# Patient Record
Sex: FEMALE | Race: WHITE | Employment: OTHER | URBAN - METROPOLITAN AREA
[De-identification: names, ages, dates, MRNs, and addresses within clinical notes are randomized per-mention and may not be internally consistent; named-entity substitution may affect disease eponyms.]

---

## 2019-12-17 ENCOUNTER — HOSPITAL ENCOUNTER (INPATIENT)
Facility: HOSPITAL | Age: 79
LOS: 2 days | Discharge: HOME | DRG: 193 | End: 2019-12-19
Attending: EMERGENCY MEDICINE | Admitting: INTERNAL MEDICINE
Payer: MEDICARE

## 2019-12-17 ENCOUNTER — APPOINTMENT (OUTPATIENT)
Dept: CT IMAGING | Facility: HOSPITAL | Age: 79
DRG: 193 | End: 2019-12-17
Attending: EMERGENCY MEDICINE
Payer: MEDICARE

## 2019-12-17 ENCOUNTER — APPOINTMENT (OUTPATIENT)
Dept: GENERAL RADIOLOGY | Facility: HOSPITAL | Age: 79
DRG: 193 | End: 2019-12-17
Attending: EMERGENCY MEDICINE
Payer: MEDICARE

## 2019-12-17 DIAGNOSIS — E87.6 HYPOKALEMIA: Primary | ICD-10-CM

## 2019-12-17 DIAGNOSIS — R09.02 HYPOXIA: ICD-10-CM

## 2019-12-17 DIAGNOSIS — J18.9 COMMUNITY ACQUIRED PNEUMONIA OF LEFT LOWER LOBE OF LUNG: ICD-10-CM

## 2019-12-17 PROCEDURE — 71275 CT ANGIOGRAPHY CHEST: CPT | Performed by: EMERGENCY MEDICINE

## 2019-12-17 PROCEDURE — 71046 X-RAY EXAM CHEST 2 VIEWS: CPT | Performed by: EMERGENCY MEDICINE

## 2019-12-17 PROCEDURE — 99223 1ST HOSP IP/OBS HIGH 75: CPT | Performed by: HOSPITALIST

## 2019-12-17 RX ORDER — ENOXAPARIN SODIUM 100 MG/ML
40 INJECTION SUBCUTANEOUS DAILY
Status: DISCONTINUED | OUTPATIENT
Start: 2019-12-18 | End: 2019-12-19

## 2019-12-17 RX ORDER — GUAIFENESIN/DEXTROMETHORPHAN 100-10MG/5
5 SYRUP ORAL EVERY 6 HOURS PRN
Status: DISCONTINUED | OUTPATIENT
Start: 2019-12-17 | End: 2019-12-19

## 2019-12-17 RX ORDER — LEVOFLOXACIN 5 MG/ML
750 INJECTION, SOLUTION INTRAVENOUS ONCE
Status: COMPLETED | OUTPATIENT
Start: 2019-12-17 | End: 2019-12-17

## 2019-12-17 RX ORDER — IPRATROPIUM BROMIDE AND ALBUTEROL SULFATE 2.5; .5 MG/3ML; MG/3ML
3 SOLUTION RESPIRATORY (INHALATION) ONCE
Status: COMPLETED | OUTPATIENT
Start: 2019-12-17 | End: 2019-12-17

## 2019-12-17 RX ORDER — POTASSIUM CHLORIDE 20 MEQ/1
40 TABLET, EXTENDED RELEASE ORAL ONCE
Status: COMPLETED | OUTPATIENT
Start: 2019-12-17 | End: 2019-12-17

## 2019-12-17 RX ORDER — METOCLOPRAMIDE HYDROCHLORIDE 5 MG/ML
10 INJECTION INTRAMUSCULAR; INTRAVENOUS EVERY 8 HOURS PRN
Status: DISCONTINUED | OUTPATIENT
Start: 2019-12-17 | End: 2019-12-19

## 2019-12-17 RX ORDER — ALBUTEROL SULFATE 2.5 MG/3ML
2.5 SOLUTION RESPIRATORY (INHALATION) EVERY 2 HOUR PRN
Status: DISCONTINUED | OUTPATIENT
Start: 2019-12-17 | End: 2019-12-19

## 2019-12-17 RX ORDER — ACETAMINOPHEN 325 MG/1
650 TABLET ORAL EVERY 6 HOURS PRN
Status: DISCONTINUED | OUTPATIENT
Start: 2019-12-17 | End: 2019-12-19

## 2019-12-17 RX ORDER — BENZONATATE 100 MG/1
100 CAPSULE ORAL 3 TIMES DAILY PRN
Status: DISCONTINUED | OUTPATIENT
Start: 2019-12-17 | End: 2019-12-19

## 2019-12-17 RX ORDER — TRAZODONE HYDROCHLORIDE 50 MG/1
50 TABLET ORAL NIGHTLY PRN
Status: DISCONTINUED | OUTPATIENT
Start: 2019-12-17 | End: 2019-12-19

## 2019-12-18 ENCOUNTER — APPOINTMENT (OUTPATIENT)
Dept: CV DIAGNOSTICS | Facility: HOSPITAL | Age: 79
DRG: 193 | End: 2019-12-18
Attending: HOSPITALIST
Payer: MEDICARE

## 2019-12-18 PROBLEM — I10 ESSENTIAL HYPERTENSION: Chronic | Status: ACTIVE | Noted: 2019-12-18

## 2019-12-18 PROBLEM — J96.01 ACUTE RESPIRATORY FAILURE WITH HYPOXIA (HCC): Status: ACTIVE | Noted: 2019-12-18

## 2019-12-18 PROCEDURE — 99232 SBSQ HOSP IP/OBS MODERATE 35: CPT | Performed by: HOSPITALIST

## 2019-12-18 PROCEDURE — 93306 TTE W/DOPPLER COMPLETE: CPT | Performed by: HOSPITALIST

## 2019-12-18 RX ORDER — HYDRALAZINE HYDROCHLORIDE 20 MG/ML
10 INJECTION INTRAMUSCULAR; INTRAVENOUS EVERY 6 HOURS PRN
Status: DISCONTINUED | OUTPATIENT
Start: 2019-12-18 | End: 2019-12-19

## 2019-12-18 RX ORDER — POTASSIUM CHLORIDE 20 MEQ/1
40 TABLET, EXTENDED RELEASE ORAL EVERY 4 HOURS
Status: COMPLETED | OUTPATIENT
Start: 2019-12-18 | End: 2019-12-18

## 2019-12-18 NOTE — PROGRESS NOTES
[facility]  Progress Note     Yi Patience Patient Status:  Observation    1940 MRN [id number]   [facility] 5NW-A Attending Boo Alarcon [address] Day # 0 PCP None Pcp     Chief Complaint: Cough    S: Patient seen / PLAN:     1. Acute hypoxic respiratory failure due to mycoplasma pneumonia (empiric rocephin and azithromycin for now); prn cough medicine; prn nebs  2.  Hypertension-not on meds for this at home-patient states she has had elevated blood pressure for a lo

## 2019-12-18 NOTE — PROGRESS NOTES
NURSING ADMISSION NOTE      Patient admitted via Cart to room 506  Oriented to room. Safety precautions initiated. Bed in low position. Call light in reach. Admission navigator complete. Pt and daughter at bedside updated on poc.  Report given to

## 2019-12-18 NOTE — PLAN OF CARE
Pt is aox4, VSS. BP has been controlled. Glasses. 3L O2. RA base. Congested sounding cough not coughing anything up. Nebs. Lovenox. Electrolyte protocol, replaced K+ today. Up SB to void. IV abx. Regular diet tolerating well.  Pt has no c/o pain SOB or n/v/

## 2019-12-18 NOTE — ED NOTES
Per ED Provider - Dr. Lennie Thornton, no blood cultures needed to be drawn at this time. OK to start Levaquin IV.

## 2019-12-18 NOTE — CM/SW NOTE
12/18/19 1216   CM/SW Referral Data   Referral Source    Reason for Referral   (PCP)   Informant Patient   Patient Info   Patient's Mental Status Alert;Oriented   Patient's [address]   Patient lives with Spouse   Patient Status

## 2019-12-18 NOTE — PROGRESS NOTES
12/18/19 2565   Clinical Encounter Type   Visited With Patient and family together  (Responded to Advanced Directive consult by giving patient the [facility] short form for their perusal and possible completion as is appropriate or nece

## 2019-12-18 NOTE — DIETARY NOTE
[address] ASSESSMENT    Pt does not meet malnutrition criteria at this time.     NUTRITION DIAGNOSIS/PROBLEM:    Possible inadequate energy intake related to physiologic causes as evidenced by MST score of 2 for wt loss/decreased Maintain lean body mass    MEDICATIONS:  KCl (40 meq) tablets, IV abx    LABS:  Glu:122, K+:3.4, GFR:78    Pt is at moderate nutrition risk    FOLLOW-UP DATE:   12/19/2019    Rosalie Rivas MS, RD, LDN  Clinical Dietitian  Pager# [phone]

## 2019-12-18 NOTE — ED NOTES
Report given to Humberto Mcnair, [address] [facility] z [id number] at 2207. Transport paged. Pt and family at bedside updated.

## 2019-12-18 NOTE — H&P
[facility]  History and Physical     Lucia Flores Patient Status:  Emergency    1940 MRN [id number]   Location [address] Attending Gurpreet Ruiz MD   Hosp Day # 0 PCP None Pcp     Chief Complaint: cough    His Psychiatric: Appropriate mood and affect.       Diagnostic Data:      Labs:  Recent Labs   Lab 12/17/19 1905   WBC 11.7*   HGB 13.4   .6*   .0       Recent Labs   Lab 12/17/19 1905   *   BUN 15   CREATSERUM 0.90   GFRAA 71   GFRNAA

## 2019-12-18 NOTE — PAYOR COMM NOTE
--------------  ADMISSION REVIEW     Payor: [address] [place], [place] [address] #:  [id number]  Authorization Number:  [id number]    Admit date: 12/17/19  Admit time: 2233       Admitting Physician: Fan Underwood MD  Attending Physician:  Virginia Cortes Musculoskeletal: Moves all extremities. Extremities: No edema or cyanosis. Integument: No rashes or lesions. Psychiatric: Appropriate mood and affect.       Diagnostic Data:      Labs:  Recent Labs   Lab 12/17/19  1905   WBC 11.7*   HGB 13.4   .6 Patient stating she is feeling a little bit better this morning than she was yesterday. Still having cough and some generalized fatigue and weakness.   No overnight events or new complaints.      Vital signs:  Temp:  [97.9 °F (36.6 °C)-98.5 °F (36.9 °C)] 9 levoFLOXacin in D5W (LEVAQUIN) IVPB premix 750 mg     Date Action Dose Route User    12/17/2019 2053 Restarted (none) Intravenous Rupal Cabrera RN    12/17/2019 2012 New Bag 750 mg Intravenous Tessie Lombardi RN      Potassium Chloride

## 2019-12-18 NOTE — ED NOTES
2nd attempt to call for report at 2202, receiving SCOTT Davenport is currently unavailable and will callback in 5 mins, as stated.

## 2019-12-19 VITALS
TEMPERATURE: 98 F | HEIGHT: 61 IN | BODY MASS INDEX: 28 KG/M2 | RESPIRATION RATE: 18 BRPM | SYSTOLIC BLOOD PRESSURE: 157 MMHG | OXYGEN SATURATION: 97 % | WEIGHT: 148.31 LBS | HEART RATE: 78 BPM | DIASTOLIC BLOOD PRESSURE: 78 MMHG

## 2019-12-19 PROCEDURE — 99239 HOSP IP/OBS DSCHRG MGMT >30: CPT | Performed by: HOSPITALIST

## 2019-12-19 RX ORDER — LISINOPRIL 10 MG/1
10 TABLET ORAL DAILY
Qty: 30 TABLET | Refills: 3 | Status: SHIPPED | OUTPATIENT
Start: 2019-12-20 | End: 2019-12-27

## 2019-12-19 RX ORDER — LISINOPRIL 10 MG/1
10 TABLET ORAL DAILY
Status: DISCONTINUED | OUTPATIENT
Start: 2019-12-19 | End: 2019-12-19

## 2019-12-19 RX ORDER — HYDROCHLOROTHIAZIDE 25 MG/1
25 TABLET ORAL DAILY
Status: DISCONTINUED | OUTPATIENT
Start: 2019-12-19 | End: 2019-12-19

## 2019-12-19 RX ORDER — HYDROCHLOROTHIAZIDE 25 MG/1
25 TABLET ORAL DAILY
Qty: 30 TABLET | Refills: 3 | Status: SHIPPED | OUTPATIENT
Start: 2019-12-20 | End: 2019-12-23

## 2019-12-19 RX ORDER — AZITHROMYCIN 250 MG/1
250 TABLET, FILM COATED ORAL DAILY
Qty: 3 TABLET | Refills: 0 | Status: SHIPPED | OUTPATIENT
Start: 2019-12-19 | End: 2019-12-22

## 2019-12-19 RX ORDER — ALBUTEROL SULFATE 90 UG/1
1 AEROSOL, METERED RESPIRATORY (INHALATION) EVERY 6 HOURS PRN
Qty: 1 INHALER | Refills: 0 | Status: SHIPPED | OUTPATIENT
Start: 2019-12-19 | End: 2020-01-10 | Stop reason: ALTCHOICE

## 2019-12-19 NOTE — PROGRESS NOTES
NURSING DISCHARGE NOTE    Discharged Home via Wheelchair. Accompanied by Family member and Spouse  Belongings Taken by patient/family   Pt is aox4, VSS, afebrile. No c/o pain SOB or n/v/d. RA. Went on O2 walk, tolerated very well.  Discharged via wheel

## 2019-12-19 NOTE — PLAN OF CARE
Problem: Patient/Family Goals  Goal: Patient/Family Long Term Goal  Description  Patient's Long Term Goal: discharge home    Interventions:  - wean off oxygen  - antibiotics  - See additional Care Plan goals for specific interventions   Outcome: Progress B/P high, hydralazine given. Pt voiding well. No BM this shift. No c/o pain. Pt up standby assist.  Will continue to monitor.

## 2019-12-19 NOTE — PROGRESS NOTES
12/19/19 1100   Mobility   O2 walk? Yes   SPO2 on Room Air at Rest 91   SPO2 Ambulation on Room Air 91   Ambulation oxygen flow (liters per minute) 0       Pt went on O2 walk. Started on RA and did not require O2 during the walk.  Remained at 91%, no c/o

## 2019-12-20 ENCOUNTER — PATIENT OUTREACH (OUTPATIENT)
Dept: CASE MANAGEMENT | Age: 79
End: 2019-12-20

## 2019-12-20 DIAGNOSIS — E87.6 HYPOKALEMIA: ICD-10-CM

## 2019-12-20 DIAGNOSIS — J96.01 ACUTE RESPIRATORY FAILURE WITH HYPOXIA (HCC): ICD-10-CM

## 2019-12-20 DIAGNOSIS — I10 ESSENTIAL HYPERTENSION: Chronic | ICD-10-CM

## 2019-12-20 DIAGNOSIS — Z02.9 ENCOUNTERS FOR UNSPECIFIED ADMINISTRATIVE PURPOSE: ICD-10-CM

## 2019-12-20 DIAGNOSIS — J18.9 COMMUNITY ACQUIRED PNEUMONIA OF LEFT LOWER LOBE OF LUNG: ICD-10-CM

## 2019-12-20 PROBLEM — I51.7 CARDIOMEGALY: Status: ACTIVE | Noted: 2019-12-20

## 2019-12-20 PROCEDURE — 1111F DSCHRG MED/CURRENT MED MERGE: CPT

## 2019-12-20 NOTE — PROGRESS NOTES
Initial Post Discharge Follow Up   Discharge Date: 12/19/19  Contact Date: 12/20/2019    Consent Verification:  Assessment Completed With: Patient  HIPAA Verified?   Yes    Discharge Dx:    Hypokalemia, community acquired pneumonia of left lower lobe of diagnoses? No  • Are you able to perform normal daily activities of living as you have prior to your hospital stay (dressing, bathing, ambulating to the bathroom, etc)?  yes  • (NCM) Was patient given a different diet per AVS? no      Medications:   Current Date & Time Appointment Department [facility])    Dec 23, 2019  9:00 AM [facility] Follow Up with James, [address] ([address])            [facility]  [facility]

## 2019-12-20 NOTE — ED PROVIDER NOTES
Patient Seen in: [facility] 5nw-a      History   Patient presents with:  Dyspnea AUBREY SOB    Stated Complaint: cough and sob x 2 wks. pneumonia and moderate cardiomegaly.  115-120 hr. 92% RA.*    HPI    80-year-old female presents emergency department erythema or exudate in the posterior pharynx  Neck: Supple no JVD no lymphadenopathy no meningismus no carotid bruit  CV: Tachycardic, no murmur no rub  Respiratory: Clear to auscultation bilaterally no crackles no wheezes no accessory muscle use  Abdomen: Please view results for these tests on the individual orders. RAINBOW DRAW BLUE   RAINBOW DRAW LAVENDER   RAINBOW DRAW LIGHT GREEN   RAINBOW DRAW GOLD          Patient had potassium noted to be low and was given supplementation.     Xr Chest acquired pneumonia of left lower lobe of lung  Hypoxia    Disposition:  Admit  12/17/2019  8:29 pm    Follow-up:  No follow-up provider specified.       Medications Prescribed:  Discharge Medication List as of 12/19/2019 12:24 PM    START taking these medic

## 2019-12-20 NOTE — PAYOR COMM NOTE
--------------  DISCHARGE REVIEW    Payor: [facility] [place] #:  [id number]  Authorization Number:  [id number]    Admit date: 12/17/19  Admit time:  2233  Discharge Date: 12/19/2019  2:01 PM     Admitting Physician: MD DAPHNE Stephens

## 2019-12-23 ENCOUNTER — OFFICE VISIT (OUTPATIENT)
Dept: INTERNAL MEDICINE CLINIC | Facility: CLINIC | Age: 79
End: 2019-12-23
Payer: COMMERCIAL

## 2019-12-23 VITALS
OXYGEN SATURATION: 98 % | BODY MASS INDEX: 27.56 KG/M2 | WEIGHT: 146 LBS | RESPIRATION RATE: 22 BRPM | HEIGHT: 61 IN | TEMPERATURE: 98 F

## 2019-12-23 DIAGNOSIS — E87.6 HYPOKALEMIA: ICD-10-CM

## 2019-12-23 DIAGNOSIS — J96.01 ACUTE RESPIRATORY FAILURE WITH HYPOXIA (HCC): Primary | ICD-10-CM

## 2019-12-23 DIAGNOSIS — I10 ESSENTIAL HYPERTENSION: Chronic | ICD-10-CM

## 2019-12-23 DIAGNOSIS — I51.7 CARDIOMEGALY: ICD-10-CM

## 2019-12-23 DIAGNOSIS — J18.9 COMMUNITY ACQUIRED PNEUMONIA OF LEFT LOWER LOBE OF LUNG: ICD-10-CM

## 2019-12-23 PROCEDURE — 1111F DSCHRG MED/CURRENT MED MERGE: CPT | Performed by: CLINICAL NURSE SPECIALIST

## 2019-12-23 PROCEDURE — 99495 TRANSJ CARE MGMT MOD F2F 14D: CPT | Performed by: CLINICAL NURSE SPECIALIST

## 2019-12-23 PROCEDURE — 80048 BASIC METABOLIC PNL TOTAL CA: CPT | Performed by: CLINICAL NURSE SPECIALIST

## 2019-12-23 NOTE — PROGRESS NOTES
[facility]ITION[facility]ARE CLINIC PHARMACIST MEDICATION RECONCILIATION        Boo Santillan MRN [id number]    1940 PCP None Pcp       Comments: Medication history completed in [address] [place] [address] by pharmacist with patient and spouse.  Patient eloise indication, timing of administration, monitoring parameters, and potential side effects of medications. Patient confirmed understanding.      Thank you,    Jd Farrar, PharmD, 12/23/2019, 8:47 AM  [facility]

## 2019-12-23 NOTE — PROGRESS NOTES
Garo Lawson 6      HISTORY   CHIEF COMPLAINT: post hospital follow up visit  HPI: Smith Dominguez is a 67year old female here today for follow up after being hospitalized for cough, weakness, body aches.   Juaquin Fay Diagnosis Date   • Essential hypertension    • High blood pressure       No past surgical history on file. No family history on file.    Social History    Tobacco Use      Smoking status: Never Smoker      Smokeless tobacco: Never Used    Alcohol use: N chest soreness with deep inspiration  CARDIOVASCULAR: denies syncope, reports lightheaded feelings at times with movement   GI: denies abdominal pain MUSCULOSKELETAL: states normal range of motion of extremities  NEUROLOGIC: denies confusion    PHYSICAL EX Disp: 1 Inhaler, Rfl: 0      Requested Prescriptions      No prescriptions requested or ordered in this encounter         Health Maintenance:  Annual Physical due on 07/18/1944  DEXA Scan due on 07/18/2006  Pneumococcal PPSV23/PCV13 65+ Years / Low and Med Visit: patient request Dr. Digna Bueno; appointment 1/13/20  3.  Cards referral: cards office to call patient to schedule     TRI De Paz, 12/23/2019  [facility]  [phone]

## 2019-12-23 NOTE — PATIENT INSTRUCTIONS
Patient Instructions:  1. We will call you with your lab results. 2.  Continue to measure your blood pressure and temperature. Record the findings and bring the log of results to your next appointment. 3.  We are closed on 12/24/19 and 12/25/19.   If yo

## 2019-12-26 ENCOUNTER — APPOINTMENT (OUTPATIENT)
Dept: LAB | Age: 79
End: 2019-12-26
Attending: CLINICAL NURSE SPECIALIST
Payer: MEDICARE

## 2019-12-26 DIAGNOSIS — E87.6 HYPOKALEMIA: ICD-10-CM

## 2019-12-26 PROCEDURE — 80048 BASIC METABOLIC PNL TOTAL CA: CPT

## 2019-12-26 PROCEDURE — 36415 COLL VENOUS BLD VENIPUNCTURE: CPT

## 2019-12-27 ENCOUNTER — TELEPHONE (OUTPATIENT)
Dept: INTERNAL MEDICINE CLINIC | Facility: CLINIC | Age: 79
End: 2019-12-27

## 2019-12-27 ENCOUNTER — OFFICE VISIT (OUTPATIENT)
Dept: INTERNAL MEDICINE CLINIC | Facility: CLINIC | Age: 79
End: 2019-12-27
Payer: COMMERCIAL

## 2019-12-27 VITALS
BODY MASS INDEX: 27.75 KG/M2 | TEMPERATURE: 97 F | HEIGHT: 61 IN | OXYGEN SATURATION: 100 % | HEART RATE: 60 BPM | DIASTOLIC BLOOD PRESSURE: 68 MMHG | WEIGHT: 147 LBS | RESPIRATION RATE: 18 BRPM | SYSTOLIC BLOOD PRESSURE: 112 MMHG

## 2019-12-27 DIAGNOSIS — I10 ESSENTIAL HYPERTENSION: Chronic | ICD-10-CM

## 2019-12-27 DIAGNOSIS — J96.01 ACUTE RESPIRATORY FAILURE WITH HYPOXIA (HCC): Primary | ICD-10-CM

## 2019-12-27 DIAGNOSIS — J18.9 COMMUNITY ACQUIRED PNEUMONIA OF LEFT LOWER LOBE OF LUNG: ICD-10-CM

## 2019-12-27 DIAGNOSIS — E87.6 HYPOKALEMIA: ICD-10-CM

## 2019-12-27 DIAGNOSIS — I51.7 CARDIOMEGALY: ICD-10-CM

## 2019-12-27 PROBLEM — N18.30 CKD (CHRONIC KIDNEY DISEASE) STAGE 3, GFR 30-59 ML/MIN (HCC): Chronic | Status: ACTIVE | Noted: 2019-12-27

## 2019-12-27 PROCEDURE — 99213 OFFICE O/P EST LOW 20 MIN: CPT | Performed by: CLINICAL NURSE SPECIALIST

## 2019-12-27 NOTE — PATIENT INSTRUCTIONS
Patient Instructions:  1. STOP your lisinopril. 2.  Continue to take your blood pressure every morning.   Call the results of your blood pressure to [facility] at the [address] [place] [address] at [phone].  3.  You will need to follow up with the cardiologis

## 2019-12-27 NOTE — PROGRESS NOTES
[address] [facility] FOLLOW-UP VISIT     Marilu Ashby MRN [id number]    1940 PCP None Pcp     CHIEF COMPLAINT: follow up visit  HPI: Ms. Oksana Yan is here today for a follow up visit.   She was last seen on 19 for a post hospital fo lightheadedness   RESPIRATORY: reports intermittent productive cough of clear colored sputum, denies dyspnea with exertion today   CARDIOVASCULAR: denies chest pain, palpitations   GI: denies abdominal pain  MUSCULOSKELETAL: states normal range of motion o Ambar 1/13/20  3.   Cards Appointment pending      Jeff Noguera, TRI, 12/27/2019   [facility]

## 2019-12-30 NOTE — DISCHARGE SUMMARY
[facility] HOSPITALIST  DISCHARGE SUMMARY     Magaly Metzger Patient Status:  Inpatient    1940 MRN [id number]   [facility] 5NW-A Attending No att. providers found   [address] Day # 2 PCP None Pcp     Date of Admission: 2019  Apolinar of Minda Borrero lungs every 6 (six) hours as needed for Wheezing or Shortness of Breath.    Quantity:  1 Inhaler  Refills:  0        ASK your doctor about these medications      Instructions Prescription details   azithromycin 250 MG Tabs  Commonly known as:  Oleg Downey  As

## 2020-01-03 ENCOUNTER — OFFICE VISIT (OUTPATIENT)
Dept: INTERNAL MEDICINE CLINIC | Facility: CLINIC | Age: 80
End: 2020-01-03
Payer: COMMERCIAL

## 2020-01-03 VITALS
HEIGHT: 61 IN | SYSTOLIC BLOOD PRESSURE: 138 MMHG | HEART RATE: 117 BPM | TEMPERATURE: 98 F | BODY MASS INDEX: 27.38 KG/M2 | DIASTOLIC BLOOD PRESSURE: 88 MMHG | OXYGEN SATURATION: 98 % | WEIGHT: 145 LBS | RESPIRATION RATE: 18 BRPM

## 2020-01-03 DIAGNOSIS — I10 ESSENTIAL HYPERTENSION: Primary | Chronic | ICD-10-CM

## 2020-01-03 DIAGNOSIS — I51.7 CARDIOMEGALY: ICD-10-CM

## 2020-01-03 PROCEDURE — 99213 OFFICE O/P EST LOW 20 MIN: CPT | Performed by: CLINICAL NURSE SPECIALIST

## 2020-01-03 NOTE — PATIENT INSTRUCTIONS
Patient Instructions:  1. Continue to measure your blood pressure every day and call the office with your results. [phone]  2. You should eat breakfast, lunch and dinner every day and drink 64 oz of water every day.

## 2020-01-03 NOTE — PROGRESS NOTES
[facility] [place] FOLLOW-UP VISIT     Estiven Becker MRN [id number]    1940 PCP None Pcp     CHIEF COMPLAINT: follow up visit for fatigue/lightheadedness  HPI: The patient is here today for a follow up visit.   She was last seen on  MUSCULOSKELETAL: states normal range of motion of extremities  NEUROLOGIC: denies confusion  PHYSICAL EXAM:  /88   Pulse 117   Temp 98.2 °F (36.8 °C) (Temporal)   Resp 18   Ht 61\"   Wt 145 lb (65.8 kg)   SpO2 98%   BMI 27.40 kg/m²    GENERAL: well

## 2020-01-07 NOTE — TELEPHONE ENCOUNTER
Pt's  states pt's BP this morning was 128/89. States pt \"feels fine\" and denies pt experiencing dizziness for the last 4 days.

## 2020-01-10 ENCOUNTER — OFFICE VISIT (OUTPATIENT)
Dept: INTERNAL MEDICINE CLINIC | Facility: CLINIC | Age: 80
End: 2020-01-10
Payer: COMMERCIAL

## 2020-01-10 VITALS
WEIGHT: 146 LBS | SYSTOLIC BLOOD PRESSURE: 132 MMHG | HEART RATE: 110 BPM | RESPIRATION RATE: 18 BRPM | HEIGHT: 61 IN | TEMPERATURE: 98 F | OXYGEN SATURATION: 99 % | BODY MASS INDEX: 27.56 KG/M2 | DIASTOLIC BLOOD PRESSURE: 88 MMHG

## 2020-01-10 DIAGNOSIS — I51.7 CARDIOMEGALY: ICD-10-CM

## 2020-01-10 DIAGNOSIS — I10 ESSENTIAL HYPERTENSION: Primary | Chronic | ICD-10-CM

## 2020-01-10 PROCEDURE — 99212 OFFICE O/P EST SF 10 MIN: CPT | Performed by: CLINICAL NURSE SPECIALIST

## 2020-01-10 NOTE — PROGRESS NOTES
[facility]ARE CLINIC FOLLOW-UP VISIT     Smith Dominguez MRN [id number]    1940 PCP None Pcp     CHIEF COMPLAINT: follow up visit for hypertension, c/o lightheadedness  HPI: Mrs. Tara Tang is here today for a follow up visit.   She was last GENERAL: well developed, well nourished, in no apparent distress  INTEGUMENTARY: warm, dry  HEENT: atraumatic, normocephalic  LUNGS: clear to auscultation bilaterally, no wheezes, rhonchi or rales, normal respiratory effort  NEURO: oriented x3  PSYCHIATR

## 2021-07-23 ENCOUNTER — APPOINTMENT (OUTPATIENT)
Dept: GENERAL RADIOLOGY | Facility: HOSPITAL | Age: 81
End: 2021-07-23
Payer: MEDICARE

## 2021-07-23 ENCOUNTER — HOSPITAL ENCOUNTER (EMERGENCY)
Facility: HOSPITAL | Age: 81
Discharge: HOME | End: 2021-07-23
Attending: EMERGENCY MEDICINE
Payer: MEDICARE

## 2021-07-23 ENCOUNTER — APPOINTMENT (OUTPATIENT)
Dept: GENERAL RADIOLOGY | Facility: HOSPITAL | Age: 81
End: 2021-07-23
Attending: EMERGENCY MEDICINE
Payer: MEDICARE

## 2021-07-23 ENCOUNTER — TELEPHONE (OUTPATIENT)
Dept: ORTHOPEDICS CLINIC | Facility: CLINIC | Age: 81
End: 2021-07-23

## 2021-07-23 VITALS
TEMPERATURE: 99 F | HEART RATE: 92 BPM | WEIGHT: 147.94 LBS | DIASTOLIC BLOOD PRESSURE: 93 MMHG | RESPIRATION RATE: 16 BRPM | SYSTOLIC BLOOD PRESSURE: 175 MMHG | OXYGEN SATURATION: 95 % | BODY MASS INDEX: 28 KG/M2

## 2021-07-23 DIAGNOSIS — S82.891A CLOSED FRACTURE OF RIGHT ANKLE, INITIAL ENCOUNTER: Primary | ICD-10-CM

## 2021-07-23 PROCEDURE — 73610 X-RAY EXAM OF ANKLE: CPT | Performed by: EMERGENCY MEDICINE

## 2021-07-23 PROCEDURE — 73630 X-RAY EXAM OF FOOT: CPT | Performed by: EMERGENCY MEDICINE

## 2021-07-23 PROCEDURE — 99284 EMERGENCY DEPT VISIT MOD MDM: CPT

## 2021-07-23 PROCEDURE — 99283 EMERGENCY DEPT VISIT LOW MDM: CPT

## 2021-07-23 NOTE — ED QUICK NOTES
Attempted to ambulate with walker, unsuccessful. Pt can take very slow short steps with assist of 2 people.   Will involve  for rehab

## 2021-07-23 NOTE — CM/SW NOTE
Asked to see patient that is needing SAMMIE. Once CM got to patient's room, the patient and family are now requesting to be discharged home. Family has arranged for 24 hour care and is refusing SAMMIE placement. MD and RN updated.

## 2021-07-23 NOTE — ED PROVIDER NOTES
Patient Seen in: [facility] Emergency Department      History   Patient presents with:  Leg or Foot Injury    Stated Complaint: ankle inj    HPI/Subjective:   HPI    52-year-old female presents emergency room chief complaint of right ankle/foot pain bilaterally. No Rales, no rhonchi, no wheezing, no stridor. ABDOMEN: Soft, nondistended,nontender  EXTREMITIES: No tenderness to the bilateral upper extremities.   Pelvis stable no tenderness bilateral hips, no tenderness deformity to the left lower extre Prescribed:  There are no discharge medications for this patient.

## 2021-07-23 NOTE — TELEPHONE ENCOUNTER
· Pt was seen today in ER  · RIGHT ankle fracture  · Please advise where we can put pt on the schedule

## 2021-07-23 NOTE — ED QUICK NOTES
Family states they've made arrangements to be with patient 24/7,  Decline to talk with , decline rehab. They state they feel she will be safe at home with family.  Walker home with patient

## 2021-07-23 NOTE — TELEPHONE ENCOUNTER
Spoke with patient's daughter, and notified her that she can be seen next week with Dr. Elsy Ambriz at [address] [place] [address]. Pt's daughter asked to be seen at [facility] d/t being closer for the patient. Appt set with Dr. Chrissy Wang. No further questions at this time.

## 2021-07-27 ENCOUNTER — OFFICE VISIT (OUTPATIENT)
Dept: ORTHOPEDICS CLINIC | Facility: CLINIC | Age: 81
End: 2021-07-27
Payer: COMMERCIAL

## 2021-07-27 VITALS — WEIGHT: 147 LBS | BODY MASS INDEX: 27.75 KG/M2 | HEIGHT: 61 IN

## 2021-07-27 DIAGNOSIS — S82.64XA CLOSED NONDISPLACED FRACTURE OF LATERAL MALLEOLUS OF RIGHT FIBULA, INITIAL ENCOUNTER: Primary | ICD-10-CM

## 2021-07-27 PROCEDURE — L3260 AMBULATORY SURGICAL BOOT EAC: HCPCS | Performed by: ORTHOPAEDIC SURGERY

## 2021-07-27 PROCEDURE — 29425 APPL SHORT LEG CAST WALKING: CPT | Performed by: ORTHOPAEDIC SURGERY

## 2021-07-27 PROCEDURE — 99203 OFFICE O/P NEW LOW 30 MIN: CPT | Performed by: ORTHOPAEDIC SURGERY

## 2021-07-27 PROCEDURE — 3008F BODY MASS INDEX DOCD: CPT | Performed by: ORTHOPAEDIC SURGERY

## 2021-07-27 RX ORDER — ACETAMINOPHEN 500 MG
500 TABLET ORAL EVERY 6 HOURS PRN
COMMUNITY

## 2021-07-27 NOTE — PROGRESS NOTES
Patient is a 50-year-old white female twisted her right ankle about a week ago getting out of bed. She is here for follow-up. She was seen at a urgent care emergency room and had a splint applied. She did have x-rays taken.   Patient's  and Elida Cedillo

## 2021-08-16 ENCOUNTER — HOSPITAL ENCOUNTER (OUTPATIENT)
Dept: GENERAL RADIOLOGY | Age: 81
Discharge: HOME | End: 2021-08-16
Attending: ORTHOPAEDIC SURGERY
Payer: MEDICARE

## 2021-08-16 DIAGNOSIS — S82.64XA CLOSED NONDISPLACED FRACTURE OF LATERAL MALLEOLUS OF RIGHT FIBULA, INITIAL ENCOUNTER: ICD-10-CM

## 2021-08-16 PROCEDURE — 73610 X-RAY EXAM OF ANKLE: CPT | Performed by: ORTHOPAEDIC SURGERY

## 2021-08-17 ENCOUNTER — OFFICE VISIT (OUTPATIENT)
Dept: ORTHOPEDICS CLINIC | Facility: CLINIC | Age: 81
End: 2021-08-17
Payer: COMMERCIAL

## 2021-08-17 VITALS — HEIGHT: 62 IN | WEIGHT: 145 LBS | BODY MASS INDEX: 26.68 KG/M2

## 2021-08-17 DIAGNOSIS — S82.64XD CLOSED NONDISPLACED FRACTURE OF LATERAL MALLEOLUS OF RIGHT FIBULA WITH ROUTINE HEALING, SUBSEQUENT ENCOUNTER: Primary | ICD-10-CM

## 2021-08-17 PROCEDURE — 3008F BODY MASS INDEX DOCD: CPT | Performed by: ORTHOPAEDIC SURGERY

## 2021-08-17 PROCEDURE — 99213 OFFICE O/P EST LOW 20 MIN: CPT | Performed by: ORTHOPAEDIC SURGERY

## 2021-08-17 PROCEDURE — 27786 TREATMENT OF ANKLE FRACTURE: CPT | Performed by: ORTHOPAEDIC SURGERY

## 2021-08-17 RX ORDER — IBUPROFEN 200 MG
200 TABLET ORAL EVERY 6 HOURS PRN
COMMUNITY

## 2021-08-17 NOTE — PROGRESS NOTES
Patient is a 21-year-old white female here for follow-up. Patient had a lateral malleolus fracture of her right ankle. She was placed in a short leg cast.  She is weightbearing as tolerated. She is here with her  and her daughter.   Patient states

## 2021-08-30 ENCOUNTER — HOSPITAL ENCOUNTER (OUTPATIENT)
Dept: GENERAL RADIOLOGY | Age: 81
Discharge: HOME | End: 2021-08-30
Attending: ORTHOPAEDIC SURGERY
Payer: MEDICARE

## 2021-08-30 DIAGNOSIS — S82.64XD CLOSED NONDISPLACED FRACTURE OF LATERAL MALLEOLUS OF RIGHT FIBULA WITH ROUTINE HEALING, SUBSEQUENT ENCOUNTER: ICD-10-CM

## 2021-08-30 PROCEDURE — 73610 X-RAY EXAM OF ANKLE: CPT | Performed by: ORTHOPAEDIC SURGERY

## 2021-08-31 ENCOUNTER — OFFICE VISIT (OUTPATIENT)
Dept: ORTHOPEDICS CLINIC | Facility: CLINIC | Age: 81
End: 2021-08-31
Payer: COMMERCIAL

## 2021-08-31 VITALS — BODY MASS INDEX: 26.68 KG/M2 | HEIGHT: 62 IN | WEIGHT: 145 LBS

## 2021-08-31 DIAGNOSIS — S82.64XD CLOSED NONDISPLACED FRACTURE OF LATERAL MALLEOLUS OF RIGHT FIBULA WITH ROUTINE HEALING, SUBSEQUENT ENCOUNTER: Primary | ICD-10-CM

## 2021-08-31 PROCEDURE — 99024 POSTOP FOLLOW-UP VISIT: CPT | Performed by: ORTHOPAEDIC SURGERY

## 2021-08-31 PROCEDURE — 3008F BODY MASS INDEX DOCD: CPT | Performed by: ORTHOPAEDIC SURGERY

## 2021-08-31 NOTE — PROGRESS NOTES
Patient is an 81-year-old white female here for follow-up. Patient had the fracture of her right ankle. She had been placed in a short leg cast.  Patient is now approximately 6 weeks ago. She is doing well.     Patient is exam out of the cast shows her t

## 2021-09-17 ENCOUNTER — TELEPHONE (OUTPATIENT)
Dept: PHYSICAL THERAPY | Facility: HOSPITAL | Age: 81
End: 2021-09-17

## 2021-09-27 ENCOUNTER — HOSPITAL ENCOUNTER (OUTPATIENT)
Dept: GENERAL RADIOLOGY | Age: 81
Discharge: HOME | End: 2021-09-27
Attending: ORTHOPAEDIC SURGERY
Payer: MEDICARE

## 2021-09-27 DIAGNOSIS — S82.64XD CLOSED NONDISPLACED FRACTURE OF LATERAL MALLEOLUS OF RIGHT FIBULA WITH ROUTINE HEALING, SUBSEQUENT ENCOUNTER: ICD-10-CM

## 2021-09-27 PROCEDURE — 73610 X-RAY EXAM OF ANKLE: CPT | Performed by: ORTHOPAEDIC SURGERY

## 2021-09-28 ENCOUNTER — OFFICE VISIT (OUTPATIENT)
Dept: ORTHOPEDICS CLINIC | Facility: CLINIC | Age: 81
End: 2021-09-28
Payer: COMMERCIAL

## 2021-09-28 VITALS — BODY MASS INDEX: 26.68 KG/M2 | HEIGHT: 62 IN | WEIGHT: 145 LBS

## 2021-09-28 DIAGNOSIS — S82.64XD CLOSED NONDISPLACED FRACTURE OF LATERAL MALLEOLUS OF RIGHT FIBULA WITH ROUTINE HEALING, SUBSEQUENT ENCOUNTER: Primary | ICD-10-CM

## 2021-09-28 PROCEDURE — 3008F BODY MASS INDEX DOCD: CPT | Performed by: ORTHOPAEDIC SURGERY

## 2021-09-28 PROCEDURE — 99024 POSTOP FOLLOW-UP VISIT: CPT | Performed by: ORTHOPAEDIC SURGERY

## 2021-09-28 NOTE — PROGRESS NOTES
Patient is a 81-year-old white female here for follow-up. Patient had the fracture of her lateral malleolus approximately 2 months ago. Patient states that overall she is doing much better. Patient's  and daughter are with her today.   Patient has

## 2021-09-30 ENCOUNTER — APPOINTMENT (OUTPATIENT)
Dept: PHYSICAL THERAPY | Age: 81
End: 2021-09-30
Attending: PHYSICIAN ASSISTANT
Payer: MEDICARE

## 2021-10-01 ENCOUNTER — MED REC SCAN ONLY (OUTPATIENT)
Dept: ORTHOPEDICS CLINIC | Facility: CLINIC | Age: 81
End: 2021-10-01

## 2021-10-04 ENCOUNTER — APPOINTMENT (OUTPATIENT)
Dept: PHYSICAL THERAPY | Age: 81
End: 2021-10-04
Attending: PHYSICIAN ASSISTANT
Payer: MEDICARE

## 2021-10-06 ENCOUNTER — APPOINTMENT (OUTPATIENT)
Dept: PHYSICAL THERAPY | Age: 81
End: 2021-10-06
Attending: PHYSICIAN ASSISTANT
Payer: MEDICARE

## 2021-10-11 ENCOUNTER — APPOINTMENT (OUTPATIENT)
Dept: PHYSICAL THERAPY | Age: 81
End: 2021-10-11
Attending: PHYSICIAN ASSISTANT
Payer: MEDICARE

## 2021-10-13 ENCOUNTER — APPOINTMENT (OUTPATIENT)
Dept: PHYSICAL THERAPY | Age: 81
End: 2021-10-13
Attending: PHYSICIAN ASSISTANT
Payer: MEDICARE

## 2021-10-18 ENCOUNTER — APPOINTMENT (OUTPATIENT)
Dept: PHYSICAL THERAPY | Age: 81
End: 2021-10-18
Attending: PHYSICIAN ASSISTANT
Payer: MEDICARE

## 2021-10-20 ENCOUNTER — APPOINTMENT (OUTPATIENT)
Dept: PHYSICAL THERAPY | Age: 81
End: 2021-10-20
Attending: PHYSICIAN ASSISTANT
Payer: MEDICARE

## 2021-10-25 ENCOUNTER — APPOINTMENT (OUTPATIENT)
Dept: PHYSICAL THERAPY | Age: 81
End: 2021-10-25
Attending: PHYSICIAN ASSISTANT
Payer: MEDICARE

## 2021-10-27 ENCOUNTER — APPOINTMENT (OUTPATIENT)
Dept: PHYSICAL THERAPY | Age: 81
End: 2021-10-27
Attending: PHYSICIAN ASSISTANT
Payer: MEDICARE

## 2022-05-10 ENCOUNTER — HOSPITAL ENCOUNTER (OUTPATIENT)
Age: 82
Discharge: HOME | End: 2022-05-10
Payer: MEDICARE

## 2022-05-10 VITALS
RESPIRATION RATE: 18 BRPM | DIASTOLIC BLOOD PRESSURE: 98 MMHG | HEART RATE: 79 BPM | SYSTOLIC BLOOD PRESSURE: 185 MMHG | BODY MASS INDEX: 26.68 KG/M2 | HEIGHT: 62 IN | OXYGEN SATURATION: 97 % | WEIGHT: 145 LBS | TEMPERATURE: 98 F

## 2022-05-10 DIAGNOSIS — H61.23 BILATERAL IMPACTED CERUMEN: Primary | ICD-10-CM

## 2022-05-10 PROCEDURE — 99213 OFFICE O/P EST LOW 20 MIN: CPT | Performed by: NURSE PRACTITIONER

## 2024-01-01 ENCOUNTER — NURSE ONLY (OUTPATIENT)
Dept: LAB | Facility: HOSPITAL | Age: 84
End: 2024-01-01
Attending: INTERNAL MEDICINE
Payer: MEDICARE

## 2024-01-01 DIAGNOSIS — K74.60 CIRRHOSIS OF LIVER (HCC): ICD-10-CM

## 2024-01-01 LAB
INR BLD: 2.14 (ref 0.8–1.2)
PROTHROMBIN TIME: 24.1 SECONDS (ref 11.6–14.8)

## 2024-01-01 PROCEDURE — 85610 PROTHROMBIN TIME: CPT

## 2024-01-01 PROCEDURE — 36415 COLL VENOUS BLD VENIPUNCTURE: CPT

## 2024-08-03 ENCOUNTER — APPOINTMENT (OUTPATIENT)
Dept: GENERAL RADIOLOGY | Facility: HOSPITAL | Age: 84
End: 2024-08-03
Attending: EMERGENCY MEDICINE
Payer: MEDICARE

## 2024-08-03 ENCOUNTER — APPOINTMENT (OUTPATIENT)
Dept: CT IMAGING | Facility: HOSPITAL | Age: 84
End: 2024-08-03
Attending: EMERGENCY MEDICINE
Payer: MEDICARE

## 2024-08-03 ENCOUNTER — HOSPITAL ENCOUNTER (INPATIENT)
Facility: HOSPITAL | Age: 84
LOS: 4 days | Discharge: HOME HEALTH | End: 2024-08-07
Attending: EMERGENCY MEDICINE | Admitting: HOSPITALIST
Payer: MEDICARE

## 2024-08-03 DIAGNOSIS — D64.9 ANEMIA, UNSPECIFIED TYPE: ICD-10-CM

## 2024-08-03 DIAGNOSIS — E87.20 METABOLIC ACIDOSIS: ICD-10-CM

## 2024-08-03 DIAGNOSIS — E87.1 HYPONATREMIA: ICD-10-CM

## 2024-08-03 DIAGNOSIS — J18.9 COMMUNITY ACQUIRED PNEUMONIA OF LEFT LOWER LOBE OF LUNG: Primary | ICD-10-CM

## 2024-08-03 LAB
ADENOVIRUS PCR:: NOT DETECTED
ALBUMIN SERPL-MCNC: 2.9 G/DL (ref 3.2–4.8)
ALBUMIN/GLOB SERPL: 0.7 {RATIO} (ref 1–2)
ALP LIVER SERPL-CCNC: 131 U/L
ALT SERPL-CCNC: 36 U/L
ANION GAP SERPL CALC-SCNC: 14 MMOL/L (ref 0–18)
AST SERPL-CCNC: 125 U/L (ref ?–34)
B PARAPERT DNA SPEC QL NAA+PROBE: NOT DETECTED
B PERT DNA SPEC QL NAA+PROBE: NOT DETECTED
BASOPHILS # BLD AUTO: 0.09 X10(3) UL (ref 0–0.2)
BASOPHILS NFR BLD AUTO: 1 %
BILIRUB SERPL-MCNC: 6.3 MG/DL (ref 0.2–1.1)
BUN BLD-MCNC: 20 MG/DL (ref 9–23)
C PNEUM DNA SPEC QL NAA+PROBE: NOT DETECTED
CALCIUM BLD-MCNC: 9.6 MG/DL (ref 8.7–10.4)
CHLORIDE SERPL-SCNC: 100 MMOL/L (ref 98–112)
CO2 SERPL-SCNC: 18 MMOL/L (ref 21–32)
CORONAVIRUS 229E PCR:: NOT DETECTED
CORONAVIRUS HKU1 PCR:: NOT DETECTED
CORONAVIRUS NL63 PCR:: NOT DETECTED
CORONAVIRUS OC43 PCR:: NOT DETECTED
CREAT BLD-MCNC: 1.57 MG/DL
DEPRECATED HBV CORE AB SER IA-ACNC: 567.3 NG/ML
EGFRCR SERPLBLD CKD-EPI 2021: 33 ML/MIN/1.73M2 (ref 60–?)
EOSINOPHIL # BLD AUTO: 0.02 X10(3) UL (ref 0–0.7)
EOSINOPHIL NFR BLD AUTO: 0.2 %
ERYTHROCYTE [DISTWIDTH] IN BLOOD BY AUTOMATED COUNT: 16.2 %
FLUAV RNA SPEC QL NAA+PROBE: NOT DETECTED
FLUBV RNA SPEC QL NAA+PROBE: NOT DETECTED
FOLATE SERPL-MCNC: 10.5 NG/ML (ref 5.4–?)
GLOBULIN PLAS-MCNC: 3.9 G/DL (ref 2–3.5)
GLUCOSE BLD-MCNC: 90 MG/DL (ref 70–99)
HCT VFR BLD AUTO: 33.1 %
HGB BLD-MCNC: 11 G/DL
HGB RETIC QN AUTO: 40.6 PG (ref 28.2–36.6)
IMM GRANULOCYTES # BLD AUTO: 0.07 X10(3) UL (ref 0–1)
IMM GRANULOCYTES NFR BLD: 0.8 %
IMM RETICS NFR: 0.15 RATIO (ref 0.1–0.3)
INR BLD: 1.68 (ref 0.8–1.2)
IRON SATN MFR SERPL: 23 %
IRON SERPL-MCNC: 43 UG/DL
LACTATE SERPL-SCNC: 2.2 MMOL/L (ref 0.5–2)
LACTATE SERPL-SCNC: 2.7 MMOL/L (ref 0.5–2)
LIPASE SERPL-CCNC: 41 U/L (ref 12–53)
LYMPHOCYTES # BLD AUTO: 1.67 X10(3) UL (ref 1–4)
LYMPHOCYTES NFR BLD AUTO: 18.1 %
MCH RBC QN AUTO: 36.7 PG (ref 26–34)
MCHC RBC AUTO-ENTMCNC: 33.2 G/DL (ref 31–37)
MCV RBC AUTO: 110.3 FL
METAPNEUMOVIRUS PCR:: NOT DETECTED
MONOCYTES # BLD AUTO: 0.72 X10(3) UL (ref 0.1–1)
MONOCYTES NFR BLD AUTO: 7.8 %
MYCOPLASMA PNEUMONIA PCR:: NOT DETECTED
NEUTROPHILS # BLD AUTO: 6.66 X10 (3) UL (ref 1.5–7.7)
NEUTROPHILS # BLD AUTO: 6.66 X10(3) UL (ref 1.5–7.7)
NEUTROPHILS NFR BLD AUTO: 72.1 %
OSMOLALITY SERPL CALC.SUM OF ELEC: 276 MOSM/KG (ref 275–295)
PARAINFLUENZA 1 PCR:: NOT DETECTED
PARAINFLUENZA 2 PCR:: NOT DETECTED
PARAINFLUENZA 3 PCR:: NOT DETECTED
PARAINFLUENZA 4 PCR:: NOT DETECTED
PLATELET # BLD AUTO: 204 10(3)UL (ref 150–450)
PLATELETS.RETICULATED NFR BLD AUTO: 5.4 % (ref 0–7)
POTASSIUM SERPL-SCNC: 5.1 MMOL/L (ref 3.5–5.1)
PROT SERPL-MCNC: 6.8 G/DL (ref 5.7–8.2)
PROTHROMBIN TIME: 19.9 SECONDS (ref 11.6–14.8)
RBC # BLD AUTO: 3 X10(6)UL
RETICS # AUTO: 79.7 X10(3) UL (ref 22.5–147.5)
RETICS/RBC NFR AUTO: 2.7 %
RHINOVIRUS/ENTERO PCR:: NOT DETECTED
RSV RNA SPEC QL NAA+PROBE: NOT DETECTED
SARS-COV-2 RNA NPH QL NAA+NON-PROBE: NOT DETECTED
SARS-COV-2 RNA RESP QL NAA+PROBE: NOT DETECTED
SODIUM SERPL-SCNC: 132 MMOL/L (ref 136–145)
TOTAL IRON BINDING CAPACITY: 183 UG/DL (ref 250–425)
TRANSFERRIN SERPL-MCNC: 90 MG/DL (ref 250–380)
VIT B12 SERPL-MCNC: 1514 PG/ML (ref 211–911)
WBC # BLD AUTO: 9.2 X10(3) UL (ref 4–11)

## 2024-08-03 PROCEDURE — 99223 1ST HOSP IP/OBS HIGH 75: CPT | Performed by: HOSPITALIST

## 2024-08-03 PROCEDURE — 74177 CT ABD & PELVIS W/CONTRAST: CPT | Performed by: EMERGENCY MEDICINE

## 2024-08-03 PROCEDURE — 71045 X-RAY EXAM CHEST 1 VIEW: CPT | Performed by: EMERGENCY MEDICINE

## 2024-08-03 RX ORDER — POLYETHYLENE GLYCOL 3350 17 G/17G
17 POWDER, FOR SOLUTION ORAL DAILY PRN
Status: DISCONTINUED | OUTPATIENT
Start: 2024-08-03 | End: 2024-08-07

## 2024-08-03 RX ORDER — BENZONATATE 200 MG/1
200 CAPSULE ORAL 3 TIMES DAILY PRN
Status: DISCONTINUED | OUTPATIENT
Start: 2024-08-03 | End: 2024-08-06

## 2024-08-03 RX ORDER — METOCLOPRAMIDE HYDROCHLORIDE 5 MG/ML
10 INJECTION INTRAMUSCULAR; INTRAVENOUS EVERY 8 HOURS PRN
Status: DISCONTINUED | OUTPATIENT
Start: 2024-08-03 | End: 2024-08-05

## 2024-08-03 RX ORDER — SENNOSIDES 8.6 MG
17.2 TABLET ORAL NIGHTLY PRN
Status: DISCONTINUED | OUTPATIENT
Start: 2024-08-03 | End: 2024-08-07

## 2024-08-03 RX ORDER — MELATONIN
3 NIGHTLY PRN
Status: DISCONTINUED | OUTPATIENT
Start: 2024-08-03 | End: 2024-08-07

## 2024-08-03 RX ORDER — ACETAMINOPHEN 500 MG
500 TABLET ORAL EVERY 4 HOURS PRN
Status: DISCONTINUED | OUTPATIENT
Start: 2024-08-03 | End: 2024-08-07

## 2024-08-03 RX ORDER — ENEMA 19; 7 G/133ML; G/133ML
1 ENEMA RECTAL ONCE AS NEEDED
Status: DISCONTINUED | OUTPATIENT
Start: 2024-08-03 | End: 2024-08-07

## 2024-08-03 RX ORDER — SODIUM CHLORIDE 9 MG/ML
125 INJECTION, SOLUTION INTRAVENOUS CONTINUOUS
Status: DISCONTINUED | OUTPATIENT
Start: 2024-08-03 | End: 2024-08-03

## 2024-08-03 RX ORDER — SODIUM CHLORIDE 9 MG/ML
INJECTION, SOLUTION INTRAVENOUS CONTINUOUS
Status: DISCONTINUED | OUTPATIENT
Start: 2024-08-03 | End: 2024-08-05

## 2024-08-03 RX ORDER — BISACODYL 10 MG
10 SUPPOSITORY, RECTAL RECTAL
Status: DISCONTINUED | OUTPATIENT
Start: 2024-08-03 | End: 2024-08-07

## 2024-08-03 RX ORDER — GUAIFENESIN 600 MG/1
600 TABLET, EXTENDED RELEASE ORAL 2 TIMES DAILY
Status: DISCONTINUED | OUTPATIENT
Start: 2024-08-03 | End: 2024-08-07

## 2024-08-03 RX ORDER — ENOXAPARIN SODIUM 100 MG/ML
40 INJECTION SUBCUTANEOUS DAILY
Status: DISCONTINUED | OUTPATIENT
Start: 2024-08-04 | End: 2024-08-07

## 2024-08-03 RX ORDER — ECHINACEA PURPUREA EXTRACT 125 MG
1 TABLET ORAL
Status: DISCONTINUED | OUTPATIENT
Start: 2024-08-03 | End: 2024-08-07

## 2024-08-03 RX ORDER — ONDANSETRON 2 MG/ML
4 INJECTION INTRAMUSCULAR; INTRAVENOUS EVERY 6 HOURS PRN
Status: DISCONTINUED | OUTPATIENT
Start: 2024-08-03 | End: 2024-08-07

## 2024-08-03 NOTE — H&P
[facility]     Gayla Howe Patient Status:  Emergency    1940 MRN [id number]   Location [facility] EMERGENCY DEPARTMENT Attending No att. providers found   Hosp Day # 0 PCP Malgorzata Villalta MD     Chief Complaint: weakness, dec appetite     Subjective:    History of Present Illness:     Gayla Howe is a 84 year old female with a PMH of HTN who presents with weakness, dec appetite.  Symptoms started about 1 week ago.  Denies any fever, chills, n/v.  She does report diarrhea, nonbloody.  No other acute complaints.      History/Other:    Past Medical History:  Past Medical History:    Essential hypertension    High blood pressure     Past Surgical History:   History reviewed. No pertinent surgical history.   Family History:   Family History   Problem Relation Age of Onset    Heart Disorder Father      Social History:    reports that she has never smoked. She has never used smokeless tobacco. She reports that she does not drink alcohol and does not use drugs.     Allergies:   Allergies   Allergen Reactions    Codeine NAUSEA ONLY       Medications:    No current facility-administered medications on file prior to encounter.     Current Outpatient Medications on File Prior to Encounter   Medication Sig Dispense Refill    ibuprofen 200 MG Oral Tab Take 200 mg by mouth every 6 (six) hours as needed for Pain. (Patient not taking: Reported on 5/10/2022)      acetaminophen 500 MG Oral Tab Take 500 mg by mouth every 6 (six) hours as needed for Pain. (Patient not taking: Reported on 5/10/2022)         Review of Systems:   A comprehensive review of systems was completed.    Pertinent positives and negatives noted in the HPI.    Objective:   Physical Exam:    /59   Pulse 90   Temp 98 °F (36.7 °C) (Temporal)   Resp 22   Wt 135 lb (61.2 kg)   SpO2 93%   BMI 24.69 kg/m²   General: No acute distress, Alert, pleasant   Respiratory: No rhonchi, no wheezes, dec BS to  left  Cardiovascular: S1, S2. Regular rate and rhythm  Abdomen: Soft, Non-tender, non-distended, positive bowel sounds  Neuro: No new focal deficits  Extremities: 1+ b/l LE pitting edema     Results:    Labs:      Labs Last 24 Hours:    Recent Labs   Lab 08/03/24  1313   RBC 3.00*   HGB 11.0*   HCT 33.1*   .3*   MCH 36.7*   MCHC 33.2   RDW 16.2   NEPRELIM 6.66   WBC 9.2   .0       Recent Labs   Lab 08/03/24  1313   GLU 90   BUN 20   CREATSERUM 1.57*   EGFRCR 33*   CA 9.6   ALB 2.9*   *   K 5.1      CO2 18.0*   ALKPHO 131   *   ALT 36   BILT 6.3*   TP 6.8       Lab Results   Component Value Date    INR 1.68 (H) 08/03/2024       No results for input(s): \"TROP\", \"TROPHS\", \"CK\" in the last 168 hours.    No results for input(s): \"TROP\", \"PBNP\" in the last 168 hours.    No results for input(s): \"PCT\" in the last 168 hours.    Imaging: Imaging data reviewed in Epic.    Assessment & Plan:      #Loculated left pleural effusion  Noted on CT  Pulm consult    #Pneumonia  CT reviewed  CXR with dense LLL effusion   Afebrile, no leukocytosis, sats good on RA  LA 2.7  Unasyn, azithro  F/u cultures     #Cirrhotic features on CT  Denies alcohol use  INR elevated, Bili 6.3  Consult GI    #CKD 3B  Cr close to baseline, monitor  Avoid nephrotoxic agents    #Hyponatremia  Na 132, fluids     #Macrocytic anemia  Hg 11, check anemia profile               Plan of care discussed with patient, er physician, nurse     Emmanuel Bennett MD    Supplementary Documentation:     The 21st Century Cures Act makes medical notes like these available to patients in the interest of transparency. Please be advised this is a medical document. Medical documents are intended to carry relevant information, facts as evident, and the clinical opinion of the practitioner. The medical note is intended as peer to peer communication and may appear blunt or direct. It is written in medical language and may contain abbreviations or verbiage  that are unfamiliar.

## 2024-08-03 NOTE — ED PROVIDER NOTES
Patient Seen in: [facility] Emergency Department      History     Chief Complaint   Patient presents with    Dehydration    Poor Feed Anorexia     Stated Complaint: Decreased appetite, weakness    Subjective:   HPI    84-year-old female who resides with her  comes to the emergency department with him for evaluation of decreased appetite, limited oral food and fluid intake over the past 8 days and overall weakness.  This patient has been able to get up to the restroom with a walker with assistance but has been lying in bed all day long.  She denies any abdominal pain.  No fever.  No abdominal surgeries.  She has had occasional loose stools including a couple small ones today that were dark.  Urine has also been concentrated.    Objective:   Past Medical History:    Essential hypertension    High blood pressure              History reviewed. No pertinent surgical history.             Social History     Socioeconomic History    Marital status:    Tobacco Use    Smoking status: Never    Smokeless tobacco: Never   Vaping Use    Vaping status: Never Used   Substance and Sexual Activity    Alcohol use: Never    Drug use: Never   Other Topics Concern    Caffeine Concern No    Exercise No    Seat Belt Yes    Special Diet No    Stress Concern Yes    Weight Concern No              Review of Systems    Positive for stated Chief Complaint: Dehydration and Poor Feed Anorexia    Other systems are as noted in HPI.  Constitutional and vital signs reviewed.      All other systems reviewed and negative except as noted above.    Physical Exam     ED Triage Vitals   BP 08/03/24 1300 106/73   Pulse 08/03/24 1300 95   Resp 08/03/24 1300 18   Temp 08/03/24 1358 98 °F (36.7 °C)   Temp src 08/03/24 1358 Temporal   SpO2 08/03/24 1300 96 %   O2 Device 08/03/24 1300 None (Room air)       Current Vitals:   Vital Signs  BP: 100/59  Pulse: 91  Resp: 25  Temp: 98 °F (36.7 °C)  Temp src: Temporal  MAP (mmHg): 72    Oxygen  Therapy  SpO2: 95 %  O2 Device: None (Room air)            Physical Exam    General appearance: This is an older female lying on a gurney.  Vital signs were reviewed per nurses notes.  HEENT: Normocephalic atraumatic.  Sclera anicteric.  Oral mucosa is moist.  Neck: No adenopathy or thyromegaly.  Lungs are clear to auscultation.  Heart exam: Normal S1-S2 without extra sounds or murmurs.  Regular rate and rhythm.  Abdomen: NABS.  Flat, soft and nontender without masses or rebound.  Skin is dry without rashes or lesions.  Mildly jaundiced.  Extremities are atraumatic.  Neuroexam: Awake, conversive and moving all 4 extremities well.    ED Course     Labs Reviewed   COMP METABOLIC PANEL (14) - Abnormal; Notable for the following components:       Result Value    Sodium 132 (*)     CO2 18.0 (*)     Creatinine 1.57 (*)     eGFR-Cr 33 (*)      (*)     Bilirubin, Total 6.3 (*)     Albumin 2.9 (*)     Globulin  3.9 (*)     A/G Ratio 0.7 (*)     All other components within normal limits   PROTHROMBIN TIME (PT) - Abnormal; Notable for the following components:    PT 19.9 (*)     INR 1.68 (*)     All other components within normal limits   CBC W/ DIFFERENTIAL - Abnormal; Notable for the following components:    RBC 3.00 (*)     HGB 11.0 (*)     HCT 33.1 (*)     .3 (*)     MCH 36.7 (*)     All other components within normal limits   LIPASE - Normal   CBC WITH DIFFERENTIAL WITH PLATELET    Narrative:     The following orders were created for panel order CBC With Differential With Platelet.  Procedure                               Abnormality         Status                     ---------                               -----------         ------                     CBC W/ DIFFERENTIAL[[id number]]          Abnormal            Final result                 Please view results for these tests on the individual orders.   URINALYSIS WITH CULTURE REFLEX   LACTIC ACID, PLASMA   BLOOD CULTURE   BLOOD CULTURE   RAPID SARS-COV-2 BY  PCR     EKG    Rate, intervals and axes as noted on EKG Report.  Rate: 94  Rhythm: Sinus Rhythm  Reading: Possible inferior infarct, age undetermined.  Cannot rule out anterior infarct, age undetermined.  T wave abnormality, consider lateral ischemia.  Abnormal EKG.  Agree with EKG report.  Intravenous access was obtained.  Laboratory studies were drawn.  IV fluids were administered.                 CT ABDOMEN+PELVIS(CONTRAST ONLY)(CPT=74177)    Result Date: 8/3/2024  PROCEDURE:  CT ABDOMEN+PELVIS (CONTRAST ONLY) (CPT=74177)  COMPARISON:  None.  INDICATIONS:  Decreased appetite, weakness  TECHNIQUE:  CT scanning was performed from the dome of the diaphragm to the pubic symphysis with non-ionic intravenous contrast material. Post contrast coronal MPR imaging was performed.  Dose reduction techniques were used. Dose information is transmitted to the ACR (American College of Radiology) NRDR (National Radiology Data Registry) which includes the Dose Index Registry.  PATIENT STATED HISTORY:(As transcribed by Technologist)  Patient is here with poor appetite and weakness.    CONTRAST USED:  70cc of Isovue 370  FINDINGS: LUNG BASE:  Partially imaged possibly loculated left pleural effusion with atelectasis/consolidation in the left lower lobe.  Underlying pneumonia is not excluded.  Clinical correlation recommended.  Scattered atelectasis/scarring elsewhere the partially  imaged lungs. LIVER:  Cirrhotic changes in the liver BILIARY:  Unremarkable. SPLEEN:  Unremarkable. PANCREAS:  Unremarkable. ADRENALS:  Unremarkable. KIDNEYS:  Unremarkable. AORTA/VASCULAR:  Mild-to-moderate mixed plaque in the aorta and iliac arteries. RETROPERITONEUM:  Unremarkable. BOWEL/MESENTERY:  Elevation of the left diaphragm.  Large hiatal hernia containing large portions of the stomach as well as some of the colon without obstructive change.  There is diffuse nonspecific colonic wall thickening with nonspecific colitis not excluded.  Clinical  correlation recommended.  Uncomplicated colonic diverticulosis.  Moderate ascites in the abdomen and pelvis along with mild mesenteric edema.  No free air.  No large or small bowel dilatation. ABDOMINAL WALL:  Nonspecific soft tissue edema. PELVIC ORGANS:  Calcified uterine fibroid.  Vascular calcifications.  Atrophic ovaries.  Nondistended urinary bladder. LYMPH NODES:  No lymphadenopathy in the abdomen or pelvis. BONES:  Degenerative changes in the spine and hips. OTHER:  None.            CONCLUSION:   1. Partially imaged possibly loculated left pleural effusion with atelectasis/consolidation in the left lower lobe.  Underlying pneumonia is not excluded.  Clinical correlation recommended.   2. Cirrhotic changes in the liver with moderate ascites in the abdomen and pelvis.  Mild mesenteric edema.  3. Uncomplicated colonic diverticulosis.  4. Large hiatal hernia.   Please see above for further details.  LOCATION:  [id number]   Dictated by (CST): Barney Gillette MD on 8/03/2024 at 3:31 PM     Finalized by (CST): Barney Gillette MD on 8/03/2024 at 3:35 PM       XR CHEST AP PORTABLE  (CPT=71045)    Result Date: 8/3/2024  PROCEDURE:  XR CHEST AP PORTABLE  (CPT=71045)  TECHNIQUE:  AP chest radiograph was obtained.  COMPARISON:  [facility] , XR, XR CHEST PA + LAT CHEST (CPT=71046), 12/17/2019, 7:24 PM.  INDICATIONS:  Decreased appetite, weakness  PATIENT STATED HISTORY: (As transcribed by Technologist)  Patient has had a loss of appetite and hasnt had a meal in 7-10 days.    FINDINGS:  The patient is partially rotated to the left midline.  Dense consolidation in the left lower lobe with associated small left pleural effusion is concerning for pneumonia.  Clinical correlation recommended along with follow-up to confirm resolution.  Cardiomegaly with normal pulmonary vascularity.  No pneumothorax.  Degenerative changes the spine with rightward curvature.            CONCLUSION:  Dense consolidation in the left lower lobe with  associated small left pleural effusion is concerning for pneumonia.  Clinical correlation recommended along with follow-up to confirm resolution.    LOCATION:  [id number]      Dictated by (CST): Barney Gillette MD on 8/03/2024 at 2:16 PM     Finalized by (CST): Barney Gillette MD on 8/03/2024 at 2:17 PM       I personally reviewed the images myself and went over results with patient.    I viewed the chest x-ray and the CT abdomen and pelvis films myself.  There is evidence of a left lower lobe infiltrate likely the cause of the patient's symptoms.  There are some cirrhotic changes noted in the liver on CT imaging but no evidence of pancreatic tumor.    Intravenous antibiotics were initiated for community-acquired pneumonia with no recent antibiotic use.  Rocephin and Zithromax were given as well as fluid bolus.  Lactic acid level is pending.    Case was conveyed to [facility] Dr. Webber.  Test results and treatment plan were discussed with the patient and her  including hospitalization for IV antibiotics and rehydration.         MDM      #1.  Community-acquired pneumonia of the left lower lobe.  Antibiotics initiated.  No evidence of pancreatic mass to explain the patient's jaundice.  This is likely secondary to infection.  No other intra-abdominal pathology.    2.  Hyponatremia.  3.  Hide anemia.  4.  Metabolic acidosis.  5.  Jaundice.  Please refer to #1.  Admission disposition: 8/3/2024  3:19 PM                                     Medical Decision Making      Disposition and Plan     Clinical Impression:  1. Community acquired pneumonia of left lower lobe of lung    2. Hyponatremia    3. Anemia, unspecified type    4. Metabolic acidosis         Disposition:  Admit  8/3/2024  3:19 pm    Follow-up:  No follow-up provider specified.        Medications Prescribed:  Current Discharge Medication List                            Hospital Problems       Present on Admission  Date Reviewed: 5/10/2022             ICD-10-CM Noted POA    * (Principal) Community acquired pneumonia of left lower lobe of lung J18.9 12/17/2019 Unknown

## 2024-08-03 NOTE — PROGRESS NOTES
NURSING ADMISSION NOTE      Patient admitted via Cart  Oriented to room.  Safety precautions initiated.  Bed in low position.  Call light in reach.  Admitted to room 506 from ER for Pneumonia c/o Dr. Bennett . Pt's  Jules at bedside and updated with plan fo care.

## 2024-08-03 NOTE — ED INITIAL ASSESSMENT (HPI)
Patient received via [facility] for poor appetite and weakness. Per family, patient has not eaten in 8 days, patient blood glucose for . Patient denies pain.

## 2024-08-03 NOTE — ED QUICK NOTES
Orders for admission, patient is aware of plan and ready to go upstairs. Any questions, please call ED RN aleena at extension [phone].     Patient Covid vaccination status: Fully vaccinated     COVID Test Ordered in ED: SARS-CoV-2 by PCR ([facility])    COVID Suspicion at Admission: N/A    Running Infusions:    sodium chloride 1,836 mL (08/03/24 1654)        Mental Status/LOC at time of transport: axox4    Other pertinent information: Patient is receiving 0.9 NS. Pt is on the first liter 836 ml needed after  CIWA score: N/A   NIH score:  N/A

## 2024-08-04 LAB
ALBUMIN SERPL-MCNC: 2.6 G/DL (ref 3.2–4.8)
ALP LIVER SERPL-CCNC: 128 U/L
ALT SERPL-CCNC: 33 U/L
ANION GAP SERPL CALC-SCNC: 11 MMOL/L (ref 0–18)
AST SERPL-CCNC: 101 U/L (ref ?–34)
BASOPHILS # BLD AUTO: 0.08 X10(3) UL (ref 0–0.2)
BASOPHILS NFR BLD AUTO: 0.9 %
BILIRUB DIRECT SERPL-MCNC: 3.3 MG/DL (ref ?–0.3)
BILIRUB SERPL-MCNC: 4.7 MG/DL (ref 0.2–1.1)
BUN BLD-MCNC: 18 MG/DL (ref 9–23)
C DIFF TOX B STL QL: NEGATIVE
CALCIUM BLD-MCNC: 9.3 MG/DL (ref 8.7–10.4)
CHLORIDE SERPL-SCNC: 102 MMOL/L (ref 98–112)
CO2 SERPL-SCNC: 19 MMOL/L (ref 21–32)
CREAT BLD-MCNC: 1.41 MG/DL
EGFRCR SERPLBLD CKD-EPI 2021: 37 ML/MIN/1.73M2 (ref 60–?)
EOSINOPHIL # BLD AUTO: 0.12 X10(3) UL (ref 0–0.7)
EOSINOPHIL NFR BLD AUTO: 1.3 %
ERYTHROCYTE [DISTWIDTH] IN BLOOD BY AUTOMATED COUNT: 16.2 %
GLUCOSE BLD-MCNC: 87 MG/DL (ref 70–99)
HCT VFR BLD AUTO: 30.9 %
HGB BLD-MCNC: 10.4 G/DL
IMM GRANULOCYTES # BLD AUTO: 0.04 X10(3) UL (ref 0–1)
IMM GRANULOCYTES NFR BLD: 0.4 %
L PNEUMO AG UR QL: NEGATIVE
LACTATE SERPL-SCNC: 2.1 MMOL/L (ref 0.5–2)
LYMPHOCYTES # BLD AUTO: 1.94 X10(3) UL (ref 1–4)
LYMPHOCYTES NFR BLD AUTO: 21.7 %
MAGNESIUM SERPL-MCNC: 1.4 MG/DL (ref 1.6–2.6)
MCH RBC QN AUTO: 36.1 PG (ref 26–34)
MCHC RBC AUTO-ENTMCNC: 33.7 G/DL (ref 31–37)
MCV RBC AUTO: 107.3 FL
MONOCYTES # BLD AUTO: 0.84 X10(3) UL (ref 0.1–1)
MONOCYTES NFR BLD AUTO: 9.4 %
NEUTROPHILS # BLD AUTO: 5.92 X10 (3) UL (ref 1.5–7.7)
NEUTROPHILS # BLD AUTO: 5.92 X10(3) UL (ref 1.5–7.7)
NEUTROPHILS NFR BLD AUTO: 66.3 %
OSMOLALITY SERPL CALC.SUM OF ELEC: 275 MOSM/KG (ref 275–295)
PLATELET # BLD AUTO: 195 10(3)UL (ref 150–450)
POTASSIUM SERPL-SCNC: 3.2 MMOL/L (ref 3.5–5.1)
PROT SERPL-MCNC: 6.3 G/DL (ref 5.7–8.2)
RBC # BLD AUTO: 2.88 X10(6)UL
SODIUM SERPL-SCNC: 132 MMOL/L (ref 136–145)
STREP PNEUMO ANTIGEN, URINE: NEGATIVE
WBC # BLD AUTO: 8.9 X10(3) UL (ref 4–11)

## 2024-08-04 PROCEDURE — 99232 SBSQ HOSP IP/OBS MODERATE 35: CPT | Performed by: HOSPITALIST

## 2024-08-04 PROCEDURE — 99223 1ST HOSP IP/OBS HIGH 75: CPT | Performed by: INTERNAL MEDICINE

## 2024-08-04 RX ORDER — MAGNESIUM OXIDE 400 MG/1
800 TABLET ORAL ONCE
Status: COMPLETED | OUTPATIENT
Start: 2024-08-04 | End: 2024-08-04

## 2024-08-04 RX ORDER — POTASSIUM CHLORIDE 20 MEQ/1
40 TABLET, EXTENDED RELEASE ORAL ONCE
Status: COMPLETED | OUTPATIENT
Start: 2024-08-04 | End: 2024-08-04

## 2024-08-04 RX ORDER — AZITHROMYCIN 250 MG/1
500 TABLET, FILM COATED ORAL EVERY 24 HOURS
Status: DISCONTINUED | OUTPATIENT
Start: 2024-08-04 | End: 2024-08-05

## 2024-08-04 NOTE — PLAN OF CARE
Pt is A&Ox3-4. VSS, afebrile. SPO2 maintained on RA. Encouraged IS. Tele, NSR. EP. Lovenox. Last BM 8/3. General diet. Voids. Up x1 and walker. Denies pain. PIV, IVF, IV ABX. Sacral wound c/d/I- mepilex in place. No further needs at this time, continue POC. Safety precautions in place.    Consulted GI and pulm this AM    Problem: Patient/Family Goals  Goal: Patient/Family Long Term Goal  Description: Patient's Long Term Goal:     Interventions:  -   - See additional Care Plan goals for specific interventions  Outcome: Progressing  Goal: Patient/Family Short Term Goal  Description: Patient's Short Term Goal:  8/3 NOC: sleep    Interventions:   - cluster care  - See additional Care Plan goals for specific interventions  Outcome: Progressing     Problem: CARDIOVASCULAR - ADULT  Goal: Maintains optimal cardiac output and hemodynamic stability  Description: INTERVENTIONS:  - Monitor vital signs, rhythm, and trends  - Monitor for bleeding, hypotension and signs of decreased cardiac output  - Evaluate effectiveness of vasoactive medications to optimize hemodynamic stability  - Monitor arterial and/or venous puncture sites for bleeding and/or hematoma  - Assess quality of pulses, skin color and temperature  - Assess for signs of decreased coronary artery perfusion - ex. Angina  - Evaluate fluid balance, assess for edema, trend weights  Outcome: Progressing     Problem: RESPIRATORY - ADULT  Goal: Achieves optimal ventilation and oxygenation  Description: INTERVENTIONS:  - Assess for changes in respiratory status  - Assess for changes in mentation and behavior  - Position to facilitate oxygenation and minimize respiratory effort  - Oxygen supplementation based on oxygen saturation or ABGs  - Provide Smoking Cessation handout, if applicable  - Encourage broncho-pulmonary hygiene including cough, deep breathe, Incentive Spirometry  - Assess the need for suctioning and perform as needed  - Assess and instruct to report SOB or  any respiratory difficulty  - Respiratory Therapy support as indicated  - Manage/alleviate anxiety  - Monitor for signs/symptoms of CO2 retention  Outcome: Progressing     Problem: GASTROINTESTINAL - ADULT  Goal: Maintains adequate nutritional intake (undernourished)  Description: INTERVENTIONS:  - Monitor percentage of each meal consumed  - Identify factors contributing to decreased intake, treat as appropriate  - Assist with meals as needed  - Monitor I&O, WT and lab values  - Obtain nutritional consult as needed  - Optimize oral hygiene and moisture  - Encourage food from home; allow for food preferences  - Enhance eating environment  Outcome: Progressing     Problem: SKIN/TISSUE INTEGRITY - ADULT  Goal: Skin integrity remains intact  Description: INTERVENTIONS  - Assess and document risk factors for pressure ulcer development  - Assess and document skin integrity  - Monitor for areas of redness and/or skin breakdown  - Initiate interventions, skin care algorithm/standards of care as needed  Outcome: Progressing  Goal: Incision(s), wounds(s) or drain site(s) healing without S/S of infection  Description: INTERVENTIONS:  - Assess and document risk factors for pressure ulcer development  - Assess and document skin integrity  - Assess and document dressing/incision, wound bed, drain sites and surrounding tissue  - Implement wound care per orders  - Initiate isolation precautions as appropriate  - Initiate Pressure Ulcer prevention bundle as indicated  Outcome: Progressing

## 2024-08-04 NOTE — CONSULTS
[facility]  Pulmonary/Critical Care Consult note    Gayla Howe Patient Status:  Inpatient    1940 MRN [id number]   Location [facility] 5NW-A Attending Emmanuel Bennett MD   Hosp Day # 1 PCP Malgorzata Villalta MD     Reason for Consultation:  Acute hypoxemia and pneumonia    History of Present Illness:  Gayla Howe is a a(n) 84 year old female never smoker with history of hypertension who presented to the emergency department with complaints of decreased appetite and weakness for the last 7-10 days .    Chest x-ray performed showed pneumonia and patient is noted to be hypoxic so pulmonary oxygen was obtained for further evaluation management.    She admits to mild cough, some shortness of breath. She has shad frequent diarrhea     Denies any fever, chills, nausea or vomiting.    She has pet dogs     History:  Past Medical History:    Essential hypertension    High blood pressure     History reviewed. No pertinent surgical history.  Family History   Problem Relation Age of Onset    Heart Disorder Father       reports that she has never smoked. She has never used smokeless tobacco. She reports that she does not drink alcohol and does not use drugs.    Allergies:  Allergies   Allergen Reactions    Codeine NAUSEA ONLY       Medications:    Current Facility-Administered Medications:     ampicillin-sulbactam (Unasyn) 3 g in sodium chloride 0.9% 100mL IVPB-ADD, 3 g, Intravenous, q12h    azithromycin (Zithromax) tab 500 mg, 500 mg, Oral, Q24H    magnesium oxide (Mag-Ox) tab 800 mg, 800 mg, Oral, Once    potassium chloride (Klor-Con M20) tab 40 mEq, 40 mEq, Oral, Once    acetaminophen (Tylenol Extra Strength) tab 500 mg, 500 mg, Oral, Q4H PRN    melatonin tab 3 mg, 3 mg, Oral, Nightly PRN    glycerin-hypromellose- (Artificial Tears) 0.2-0.2-1 % ophthalmic solution 1 drop, 1 drop, Both Eyes, QID PRN    sodium chloride (Saline Mist) 0.65 % nasal solution 1 spray, 1 spray, Each Nare, Q3H PRN     enoxaparin (Lovenox) 40 MG/0.4ML SUBQ injection 40 mg, 40 mg, Subcutaneous, Daily    polyethylene glycol (PEG 3350) (Miralax) 17 g oral packet 17 g, 17 g, Oral, Daily PRN    sennosides (Senokot) tab 17.2 mg, 17.2 mg, Oral, Nightly PRN    bisacodyl (Dulcolax) 10 MG rectal suppository 10 mg, 10 mg, Rectal, Daily PRN    fleet enema (Fleet) 7-19 GM/118ML rectal enema 133 mL, 1 enema, Rectal, Once PRN    ondansetron (Zofran) 4 MG/2ML injection 4 mg, 4 mg, Intravenous, Q6H PRN    metoclopramide (Reglan) 5 mg/mL injection 10 mg, 10 mg, Intravenous, Q8H PRN    benzonatate (Tessalon) cap 200 mg, 200 mg, Oral, TID PRN    guaiFENesin ER (Mucinex) 12 hr tab 600 mg, 600 mg, Oral, BID    azithromycin (Zithromax) 500 mg in sodium chloride 0.9% 250mL IVPB premix, 500 mg, Intravenous, Q24H    sodium chloride 0.9% infusion, , Intravenous, Continuous    Intake/Output:  No intake or output data in the 24 hours ending 08/04/24 1225   Body mass index is 24.69 kg/m².    Review of Systems  Review of Systems:   A comprehensive 10 point review of systems was completed.  Pertinent positives and negatives noted in the the HPI.         Patient Vitals for the past 24 hrs:   BP Temp Temp src Pulse Resp SpO2 Height Weight   08/04/24 0834 108/61 97.9 °F (36.6 °C) Oral 79 19 94 % -- --   08/04/24 0438 93/54 98.4 °F (36.9 °C) Oral 75 18 92 % -- --   08/03/24 2317 106/62 98.4 °F (36.9 °C) Oral 87 18 91 % -- --   08/03/24 1843 94/62 98 °F (36.7 °C) Oral 96 20 95 % -- --   08/03/24 1800 -- -- -- -- -- -- 5' 2\" (1.575 m) --   08/03/24 1700 107/59 -- -- 90 22 93 % -- --   08/03/24 1645 104/66 -- -- 92 17 93 % -- --   08/03/24 1630 -- -- -- 95 21 94 % -- --   08/03/24 1545 -- -- -- 99 23 -- -- --   08/03/24 1530 -- -- -- -- -- -- -- 135 lb (61.2 kg)   08/03/24 1430 -- -- -- 91 25 95 % -- --   08/03/24 1415 -- -- -- 86 17 97 % -- --   08/03/24 1358 -- 98 °F (36.7 °C) Temporal -- -- -- -- --   08/03/24 1345 -- -- -- 87 16 97 % -- --   08/03/24 1330 100/59 --  -- 88 12 96 % -- --   08/03/24 1315 106/73 -- -- 96 14 97 % -- --   08/03/24 1300 106/73 -- -- 95 18 96 % -- --     Vitals:    08/03/24 1843 08/03/24 2317 08/04/24 0438 08/04/24 0834   BP: 94/62 106/62 93/54 108/61   BP Location: Left arm Left arm Left arm Left arm   Pulse: 96 87 75 79   Resp: 20 18 18 19   Temp: 98 °F (36.7 °C) 98.4 °F (36.9 °C) 98.4 °F (36.9 °C) 97.9 °F (36.6 °C)   TempSrc: Oral Oral Oral Oral   SpO2: 95% 91% 92% 94%   Weight:       Height:             Physical Exam  Constitutional:       General: She is not in acute distress.     Appearance: Normal appearance. She is not ill-appearing or diaphoretic.   HENT:      Head: Normocephalic and atraumatic.      Nose: Nose normal. No congestion or rhinorrhea.      Mouth/Throat:      Mouth: Mucous membranes are moist.      Pharynx: Oropharynx is clear. No oropharyngeal exudate or posterior oropharyngeal erythema.      Comments: Mallampati class II palate  Eyes:      General: Scleral icterus present.      Extraocular Movements: Extraocular movements intact.      Pupils: Pupils are equal, round, and reactive to light.   Cardiovascular:      Rate and Rhythm: Normal rate.      Pulses: Normal pulses.      Heart sounds: Normal heart sounds. No murmur heard.  Pulmonary:      Effort: Pulmonary effort is normal. No respiratory distress.      Breath sounds: Normal breath sounds. No wheezing or rhonchi.      Comments: Diminished breath sounds in bilateral lower lung zones otherwise clear to auscultation  Chest:      Chest wall: No tenderness.   Abdominal:      General: Abdomen is flat. Bowel sounds are normal.      Palpations: Abdomen is soft.   Musculoskeletal:         General: Normal range of motion.   Skin:     General: Skin is warm.   Neurological:      General: No focal deficit present.      Mental Status: She is alert and oriented to person, place, and time.   Psychiatric:         Mood and Affect: Mood normal.         Behavior: Behavior normal.         Thought  Content: Thought content normal.         Judgment: Judgment normal.            Lab Data Review:  Recent Labs   Lab 08/03/24  1313 08/04/24  1048   WBC 9.2 8.9   HGB 11.0* 10.4*   HCT 33.1* 30.9*   .0 195.0       Recent Labs   Lab 08/03/24  1313 08/04/24  1048   * 132*   K 5.1 3.2*    102   CO2 18.0* 19.0*   BUN 20 18   CREATSERUM 1.57* 1.41*   CA 9.6 9.3   ALB 2.9* 2.6*   ALKPHO 131 128   ALT 36 33   * 101*   GLU 90 87       Recent Labs   Lab 08/04/24  1048   MG 1.4*       No results found for: \"PHOS\", \"PHOSPHORUS\"     Recent Labs   Lab 08/03/24  1438   INR 1.68*       No results for input(s): \"ABGPHT\", \"LINEHB2A\", \"WICSC1S\", \"ABGHCO3\", \"ABGBE\", \"TEMP\", \"PETER\", \"SITE\", \"DEV\", \"THGB\" in the last 168 hours.    No results for input(s): \"TROP\", \"CKMB\" in the last 168 hours.    Cultures: No results found for this visit on 08/03/24.        Radiology personally reviewed:  CT ABDOMEN+PELVIS(CONTRAST ONLY)(CPT=74177)    Result Date: 8/3/2024  CONCLUSION:   1. Partially imaged possibly loculated left pleural effusion with atelectasis/consolidation in the left lower lobe.  Underlying pneumonia is not excluded.  Clinical correlation recommended.   2. Cirrhotic changes in the liver with moderate ascites in the abdomen and pelvis.  Mild mesenteric edema.  3. Uncomplicated colonic diverticulosis.  4. Large hiatal hernia.   Please see above for further details.  LOCATION:  [id number]   Dictated by (CST): Barney Gillette MD on 8/03/2024 at 3:31 PM     Finalized by (CST): Barney Gillette MD on 8/03/2024 at 3:35 PM       XR CHEST AP PORTABLE  (CPT=71045)    Result Date: 8/3/2024  CONCLUSION:  Dense consolidation in the left lower lobe with associated small left pleural effusion is concerning for pneumonia.  Clinical correlation recommended along with follow-up to confirm resolution.    LOCATION:  [id number]      Dictated by (CST): Barney Gillette MD on 8/03/2024 at 2:16 PM     Finalized by (CST): Barney Gillette MD  on 8/03/2024 at 2:17 PM         Patient Active Problem List   Diagnosis    Hypokalemia    Pneumonia due to Mycoplasma pneumoniae    Community acquired pneumonia of left lower lobe of lung    Hypoxia    Essential hypertension    Acute respiratory failure with hypoxia (HCC)    Cardiomegaly    CKD (chronic kidney disease) stage 3, GFR 30-59 ml/min (HCC)    Hyponatremia    Anemia, unspecified type    Metabolic acidosis       Assessment:  Acute hypoxic respiratory failure: Requiring supplemental oxygen 2 L/min by nasal cannula  Left lower lobe l atelectasis with loss of volume and left lower lobe/consolidation/differential diagnosis would include community-acquired pneumonia although cannot rule out endobronchial obstruction with a mucous plug or neoplastic process etc.  Loculated left pleural effusion, this could represent parapneumonic effusion versus translocation of ascites to left pleural space versus neoplastic process  Hypertension  Cirrhosis of liver on CT abdomen   Transaminitis: Mildly elevated AST on labs  Mild to moderate mixed plaque in the aorta and iliac arteries  AMIRA/CKD  Anemia  Hyperbilirubinemia/jaundice  Hyponatremia        Plan:  Wean FiO2 to keep oxygen saturations between 90% to 92%  Continue current antibiotics  Check urine strep pneumonia and Legionella antigen pending  Left thoracentesis by interventional radiology and send sample for culture and cytology  Ascites drainage per gastroenterology  Would need follow-up CT chest to ensure resolution of left lower lobe pneumonia/pleural effusion in 6 to 8 weeks  DVT prophylaxis:--  GI prophylaxis: Protonix IV 40 mg daily    Will follow for further recommendations    Thank You for allowing me to participate in this patient's care     Prabhu Carbajal MD    Note to the patient: The 21st Century Cures Act makes medical notes like these available to patients in the interest of transparency. However, be advised that this is a medical document. It is intended  as peer to peer communication. It is written in medical language and may contain abbreviations or verbiage that are unfamiliar. It may appear blunt or direct. Medical documents are intended to carry relevant information, facts as evident, and clinical opinion of the practitioner.      Disclaimer: Components of this note were documented using voice recognition system and are subject to errors not corrected at proofreading. Contact the author of this note for any clarifications

## 2024-08-04 NOTE — PROGRESS NOTES
[facility]   part of [facility]     Hospitalist Progress Note     Gayla Howe Patient Status:  Inpatient    1940 MRN [id number]   Location [facility] 5NW-A Attending Augusto Easley MD   Hosp Day # 1 PCP Malgorzata Villalta MD     Chief Complaint: weakness, dec appetite     Subjective:     Patient still with low appetite.  Diarrhea better.  Denies fever, chills, n/v.  Son at bedside.      Objective:    Review of Systems:   A comprehensive review of systems was completed; pertinent positive and negatives stated in subjective.    Vital signs:  Temp:  [98 °F (36.7 °C)-98.4 °F (36.9 °C)] 98.4 °F (36.9 °C)  Pulse:  [75-99] 75  Resp:  [12-25] 18  BP: ()/(54-73) 93/54  SpO2:  [91 %-97 %] 92 %    Physical Exam:    General: No acute distress, pleasant, thin  Respiratory: Dec to left base, rhonchi   Cardiovascular: S1, S2, regular rate and rhythm  Abdomen: Soft, Non-tender, non-distended, positive bowel sounds  Neuro: No new focal deficits.   Extremities: No edema    Diagnostic Data:    Labs:  Recent Labs   Lab 24  1313 24  1438   WBC 9.2  --    HGB 11.0*  --    .3*  --    .0  --    INR  --  1.68*       Recent Labs   Lab 24  1313   GLU 90   BUN 20   CREATSERUM 1.57*   CA 9.6   ALB 2.9*   *   K 5.1      CO2 18.0*   ALKPHO 131   *   ALT 36   BILT 6.3*   TP 6.8       Estimated Creatinine Clearance: 21.5 mL/min (A) (based on SCr of 1.57 mg/dL (H)).    No results for input(s): \"TROP\", \"TROPHS\", \"CK\" in the last 168 hours.    Recent Labs   Lab 24  1438   PTP 19.9*   INR 1.68*                  Microbiology    No results found for this visit on 24.      Imaging: Reviewed in Epic.    Medications:    ampicillin-sulbactam  3 g Intravenous q12h    enoxaparin  40 mg Subcutaneous Daily    guaiFENesin ER  600 mg Oral BID    azithromycin  500 mg Intravenous Q24H       Assessment & Plan:      #Loculated left pleural effusion  Noted on CT  Pulm consult  - f/u CT after discharge to ensure resolution      #Pneumonia  CT reviewed  CXR with dense LLL effusion   Afebrile, no leukocytosis, sats good on RA  LA 2.7  Unasyn, azithro  F/u cultures      #Cirrhotic features on CT  Denies alcohol use  INR elevated, Bili 6.3 -> 4.7  Consult GI    #CKD 3B  Cr close to baseline, monitor  Avoid nephrotoxic agents     #Hyponatremia  Na 132, fluids     #Macrocytic anemia  Hg 11 -> 10.4, check anemia profile     #medical debility/deconditioning  PT/OT         Emmanuel Bennett MD    Supplementary Documentation:     Quality:  DVT Mechanical Prophylaxis:     Early ambuation  DVT Pharmacologic Prophylaxis   Medication    enoxaparin (Lovenox) 40 MG/0.4ML SUBQ injection 40 mg                Code Status: Full Code  Street: No urinary catheter in place  Street Duration (in days):   Central line:    HEIKE:     Discharge is dependent on: clinical recovery   At this point Ms. Howe is expected to be discharge to: Home    The 21st Century Cures Act makes medical notes like these available to patients in the interest of transparency. Please be advised this is a medical document. Medical documents are intended to carry relevant information, facts as evident, and the clinical opinion of the practitioner. The medical note is intended as peer to peer communication and may appear blunt or direct. It is written in medical language and may contain abbreviations or verbiage that are unfamiliar.             **Certification      PHYSICIAN Certification of Need for Inpatient Hospitalization - Initial Certification    Patient will require inpatient services that will reasonably be expected to span two midnight's based on the clinical documentation in H+P.   Based on patients current state of illness, I anticipate that, after discharge, patient will require TBD.

## 2024-08-04 NOTE — PLAN OF CARE
Problem: Patient/Family Goals  Goal: Patient/Family Long Term Goal  Description: Patient's Long Term Goal: DISCHARGE HOME    Interventions:  - IV ANTIBIOTICS  MONITOR 02 SATS  - See additional Care Plan goals for specific interventions  Outcome: Progressing  Goal: Patient/Family Short Term Goal  Description: Patient's Short Term Goal: 8/3/24 HAVE LESS DIARRHEA    Interventions:   - MONITOR BM,  COLLECT STOOL FOR CDIFF  - See additional Care Plan goals for specific interventions  Outcome: Progressing     Problem: CARDIOVASCULAR - ADULT  Goal: Maintains optimal cardiac output and hemodynamic stability  Description: INTERVENTIONS:  - Monitor vital signs, rhythm, and trends  - Monitor for bleeding, hypotension and signs of decreased cardiac output  - Evaluate effectiveness of vasoactive medications to optimize hemodynamic stability  - Monitor arterial and/or venous puncture sites for bleeding and/or hematoma  - Assess quality of pulses, skin color and temperature  - Assess for signs of decreased coronary artery perfusion - ex. Angina  - Evaluate fluid balance, assess for edema, trend weights  Outcome: Progressing     Problem: RESPIRATORY - ADULT  Goal: Achieves optimal ventilation and oxygenation  Description: INTERVENTIONS:  - Assess for changes in respiratory status  - Assess for changes in mentation and behavior  - Position to facilitate oxygenation and minimize respiratory effort  - Oxygen supplementation based on oxygen saturation or ABGs  - Provide Smoking Cessation handout, if applicable  - Encourage broncho-pulmonary hygiene including cough, deep breathe, Incentive Spirometry  - Assess the need for suctioning and perform as needed  - Assess and instruct to report SOB or any respiratory difficulty  - Respiratory Therapy support as indicated  - Manage/alleviate anxiety  - Monitor for signs/symptoms of CO2 retention  Outcome: Progressing     Problem: GASTROINTESTINAL - ADULT  Goal: Maintains adequate nutritional  intake (undernourished)  Description: INTERVENTIONS:  - Monitor percentage of each meal consumed  - Identify factors contributing to decreased intake, treat as appropriate  - Assist with meals as needed  - Monitor I&O, WT and lab values  - Obtain nutritional consult as needed  - Optimize oral hygiene and moisture  - Encourage food from home; allow for food preferences  - Enhance eating environment  Outcome: Progressing     Problem: SKIN/TISSUE INTEGRITY - ADULT  Goal: Skin integrity remains intact  Description: INTERVENTIONS  - Assess and document risk factors for pressure ulcer development  - Assess and document skin integrity  - Monitor for areas of redness and/or skin breakdown  - Initiate interventions, skin care algorithm/standards of care as needed  Outcome: Progressing  Goal: Incision(s), wounds(s) or drain site(s) healing without S/S of infection  Description: INTERVENTIONS:  - Assess and document risk factors for pressure ulcer development  - Assess and document skin integrity  - Assess and document dressing/incision, wound bed, drain sites and surrounding tissue  - Implement wound care per orders  - Initiate isolation precautions as appropriate  - Initiate Pressure Ulcer prevention bundle as indicated  Outcome: Progressing

## 2024-08-04 NOTE — CONSULTS
[facility]  GASTROENTEROLOGY NEW CONSULT NOTE   [facility]     Gayla Howe Patient Status:  Inpatient    1940 MRN [id number]   Location [facility] 5NW-A Attending Emmanuel Bennett MD   Hosp Day # 1 PCP Malgorzata Villalta MD       Reason for Consultation:   Decreased appetite, elevated liver enzymes    History of Present Illness (HPI):  Gayla Howe is a 84 year old female with chronic medical conditions including hypertension that presented to the ER yesterday with decreased appetite.  She notes decreased appetite for the past 8 days and has only been able to take in small amounts of food as she does not feel hungry.  Prior to this she would take 1 meal a day.  She otherwise denies abdominal pain, nausea, emesis.  She did have diarrhea yesterday, but otherwise has been having good bowel movements.  Denies melena or hematochezia.  On admission she was noted to have elevated liver enzymes with , ALT 36, alkaline phosphatase 131, total bilirubin 6.3.  These have down trended slightly today to , ALT 33, alkaline phosphatase 128, total bili of 4.7.  He had a CT on admission with cirrhotic appearing liver with moderate ascites in the abdomen.  There was a large hiatal hernia as well.  She was also noted to have suspected pneumonia and is being treated for this as well with Unasyn and azithromycin at this time.  Denies NSAID use.  Denies alcohol use.  Denies prior EGD or colonoscopy.    Past Medical History:  Past Medical History:    Essential hypertension    High blood pressure       Past Surgical History:  History reviewed. No pertinent surgical history.    Family History:  No first-degree relative with a history of colorectal cancer.    Social History:  No IVDU      Medications:    ampicillin-sulbactam (Unasyn) 3 g in sodium chloride 0.9% 100mL IVPB-ADD  3 g Intravenous q12h    azithromycin (Zithromax) tab 500 mg  500 mg Oral Q24H    [COMPLETED] magnesium oxide (Mag-Ox)  tab 800 mg  800 mg Oral Once    [COMPLETED] potassium chloride (Klor-Con M20) tab 40 mEq  40 mEq Oral Once    [COMPLETED] cefTRIAXone (Rocephin) 1 g in sodium chloride 0.9% 100 mL IVPB-ADDV  1 g Intravenous Once    [COMPLETED] azithromycin (Zithromax) 500 mg in sodium chloride 0.9% 250mL IVPB premix  500 mg Intravenous Once    [] sodium chloride 0.9 % IV bolus 1,836 mL  30 mL/kg Intravenous Continuous    [COMPLETED] iopamidol 76% (ISOVUE-370) injection for power injector  70 mL Intravenous ONCE PRN    acetaminophen (Tylenol Extra Strength) tab 500 mg  500 mg Oral Q4H PRN    melatonin tab 3 mg  3 mg Oral Nightly PRN    glycerin-hypromellose- (Artificial Tears) 0.2-0.2-1 % ophthalmic solution 1 drop  1 drop Both Eyes QID PRN    sodium chloride (Saline Mist) 0.65 % nasal solution 1 spray  1 spray Each Nare Q3H PRN    [Held by provider] enoxaparin (Lovenox) 40 MG/0.4ML SUBQ injection 40 mg  40 mg Subcutaneous Daily    polyethylene glycol (PEG 3350) (Miralax) 17 g oral packet 17 g  17 g Oral Daily PRN    sennosides (Senokot) tab 17.2 mg  17.2 mg Oral Nightly PRN    bisacodyl (Dulcolax) 10 MG rectal suppository 10 mg  10 mg Rectal Daily PRN    fleet enema (Fleet) 7-19 GM/118ML rectal enema 133 mL  1 enema Rectal Once PRN    ondansetron (Zofran) 4 MG/2ML injection 4 mg  4 mg Intravenous Q6H PRN    metoclopramide (Reglan) 5 mg/mL injection 10 mg  10 mg Intravenous Q8H PRN    benzonatate (Tessalon) cap 200 mg  200 mg Oral TID PRN    guaiFENesin ER (Mucinex) 12 hr tab 600 mg  600 mg Oral BID    azithromycin (Zithromax) 500 mg in sodium chloride 0.9% 250mL IVPB premix  500 mg Intravenous Q24H    sodium chloride 0.9% infusion   Intravenous Continuous       Allergies:  Allergies   Allergen Reactions    Codeine NAUSEA ONLY       Review of Systems  Negative unless otherwise mentioned in HPI.     Physical Exam  /64 (BP Location: Left arm)   Pulse 80   Temp 98 °F (36.7 °C) (Oral)   Resp 19   Ht 5' 2\" (1.575  m)   Wt 135 lb (61.2 kg)   SpO2 100%   BMI 24.69 kg/m²   Body mass index is 24.69 kg/m².      Gen: Awake and alert, NAD  Eyes:  no scleral icterus  Cardiovascular: Warm, well-perfused  Respiratory: No respiratory distress  Abd: Soft, non-tender, non-distended. No rebound tenderness, no guarding.  Neuro:  Alert and oriented to name, location and time.     Diagnostic Data:      Labs:  Recent Labs   Lab 08/03/24  1313 08/03/24  1438 08/04/24  1048   WBC 9.2  --  8.9   HGB 11.0*  --  10.4*   .3*  --  107.3*   .0  --  195.0   INR  --  1.68*  --        Recent Labs   Lab 08/03/24  1313 08/04/24  1048   GLU 90 87   BUN 20 18   CREATSERUM 1.57* 1.41*   CA 9.6 9.3   ALB 2.9* 2.6*   * 132*   K 5.1 3.2*    102   CO2 18.0* 19.0*   ALKPHO 131 128   * 101*   ALT 36 33   BILT 6.3* 4.7*   TP 6.8 6.3       Recent Labs   Lab 08/03/24  1438   PTP 19.9*   INR 1.68*              Imaging: Imaging data reviewed in Epic.    Medications:    ampicillin-sulbactam  3 g Intravenous q12h    azithromycin  500 mg Oral Q24H    [Held by provider] enoxaparin  40 mg Subcutaneous Daily    guaiFENesin ER  600 mg Oral BID    azithromycin  500 mg Intravenous Q24H       Allergies:  Allergies   Allergen Reactions    Codeine NAUSEA ONLY       Imaging:  I have personally reviewed all pertinent available imaging.       ASSESSMENT / PLAN:     Gayla Howe is a 84 year old female with history of hypertension that presented to the ER with decreased appetite and also found to have elevated liver enzymes and possible cirrhosis.    Etiology is currently unknown at this time, but suspect may be multifactorial due to suspected cirrhosis, pneumonia, ascites  Suspected cirrhosis with cirrhotic appearing morphology CT along with ascites.  Etiology at this time is currently unknown.  She is noted to have elevated INR as well.  Elevated liver enzymes-May be secondary to suspected cirrhosis but also due to infectious etiology with  pneumonia.  Liver enzymes are currently improving.      Recommendations:   Okay for diet today from GI perspective as tolerated.  Recommend low-sodium diet.  N.p.o. after midnight for paracentesis  Ultrasound paracentesis with fluid studies  Treatment of pneumonia per primary and pulmonology  Chronic liver disease serologies  Pending results above, if no improvement in appetite, then may need to consider EGD for further evaluation of decreased appetite and possibly variceal screening as well  Pantoprazole 40 mg daily for decreased appetite in case gastritis or PUD contributing  CBC, CMP, and INR in the morning    Thank you for the consult and allowing us to participate in patient's care.  GI will follow. Please page with any questions or concerns.     Adrian Newby,   8/4/2024  [facility]

## 2024-08-05 ENCOUNTER — APPOINTMENT (OUTPATIENT)
Dept: GENERAL RADIOLOGY | Facility: HOSPITAL | Age: 84
End: 2024-08-05
Attending: RADIOLOGY
Payer: MEDICARE

## 2024-08-05 ENCOUNTER — APPOINTMENT (OUTPATIENT)
Dept: ULTRASOUND IMAGING | Facility: HOSPITAL | Age: 84
End: 2024-08-05
Attending: STUDENT IN AN ORGANIZED HEALTH CARE EDUCATION/TRAINING PROGRAM
Payer: MEDICARE

## 2024-08-05 ENCOUNTER — APPOINTMENT (OUTPATIENT)
Dept: ULTRASOUND IMAGING | Facility: HOSPITAL | Age: 84
End: 2024-08-05
Attending: INTERNAL MEDICINE
Payer: MEDICARE

## 2024-08-05 ENCOUNTER — APPOINTMENT (OUTPATIENT)
Dept: CV DIAGNOSTICS | Facility: HOSPITAL | Age: 84
End: 2024-08-05
Attending: HOSPITALIST
Payer: MEDICARE

## 2024-08-05 ENCOUNTER — APPOINTMENT (OUTPATIENT)
Dept: GENERAL RADIOLOGY | Facility: HOSPITAL | Age: 84
End: 2024-08-05
Attending: INTERNAL MEDICINE
Payer: MEDICARE

## 2024-08-05 PROBLEM — J90 PLEURAL EFFUSION: Status: ACTIVE | Noted: 2024-08-05

## 2024-08-05 PROBLEM — J90 PLEURAL EFFUSION: Status: ACTIVE | Noted: 2024-01-01

## 2024-08-05 PROBLEM — J96.01 ACUTE HYPOXIC RESPIRATORY FAILURE (HCC): Status: ACTIVE | Noted: 2019-12-18

## 2024-08-05 LAB
ALBUMIN SERPL-MCNC: 2.5 G/DL (ref 3.2–4.8)
ALBUMIN/GLOB SERPL: 0.7 {RATIO} (ref 1–2)
ALP LIVER SERPL-CCNC: 126 U/L
ALT SERPL-CCNC: 34 U/L
ANION GAP SERPL CALC-SCNC: 10 MMOL/L (ref 0–18)
AST SERPL-CCNC: 101 U/L (ref ?–34)
ATRIAL RATE: 94 BPM
BASOPHILS NFR PLR: 0 %
BILIRUB SERPL-MCNC: 3.8 MG/DL (ref 0.2–1.1)
BUN BLD-MCNC: 15 MG/DL (ref 9–23)
CALCIUM BLD-MCNC: 9.2 MG/DL (ref 8.7–10.4)
CHLORIDE SERPL-SCNC: 105 MMOL/L (ref 98–112)
CHOLEST PLR-MCNC: <50 MG/DL
CO2 SERPL-SCNC: 19 MMOL/L (ref 21–32)
COLOR FLD: YELLOW
CREAT BLD-MCNC: 1.35 MG/DL
DSDNA IGG SERPL IA-ACNC: 1 IU/ML
EBV NA IGG SER QL IA: POSITIVE
EBV VCA IGG SER QL IA: POSITIVE
EBV VCA IGM SER QL IA: NEGATIVE
EGFRCR SERPLBLD CKD-EPI 2021: 39 ML/MIN/1.73M2 (ref 60–?)
ENA AB SER QL IA: 0.8 UG/L
EOSINOPHIL NFR PLR: 0 %
ERYTHROCYTE [DISTWIDTH] IN BLOOD BY AUTOMATED COUNT: 16.2 %
GLOBULIN PLAS-MCNC: 3.7 G/DL (ref 2–3.5)
GLUCOSE BLD-MCNC: 77 MG/DL (ref 70–99)
GLUCOSE PLR-MCNC: 86 MG/DL
HCT VFR BLD AUTO: 29.7 %
HGB BLD-MCNC: 9.9 G/DL
INR BLD: 1.64 (ref 0.8–1.2)
LDH FLD L TO P-CCNC: 74 U/L
LYMPHOCYTES NFR PLR: 67 %
MAGNESIUM SERPL-MCNC: 1.5 MG/DL (ref 1.6–2.6)
MCH RBC QN AUTO: 36 PG (ref 26–34)
MCHC RBC AUTO-ENTMCNC: 33.3 G/DL (ref 31–37)
MCV RBC AUTO: 108 FL
MESOTHL CELL NFR PLR: 4 %
MONOS+MACROS NFR PLR: 6 %
NEUTROPHILS NFR PLR: 23 %
OSMOLALITY SERPL CALC.SUM OF ELEC: 278 MOSM/KG (ref 275–295)
P AXIS: 38 DEGREES
P-R INTERVAL: 154 MS
PH PLR: 7.45 [PH] (ref 7.2–7.5)
PLATELET # BLD AUTO: 197 10(3)UL (ref 150–450)
POTASSIUM SERPL-SCNC: 3.6 MMOL/L (ref 3.5–5.1)
POTASSIUM SERPL-SCNC: 3.6 MMOL/L (ref 3.5–5.1)
PROT PLR-MCNC: <2 G/DL
PROT SERPL-MCNC: 6.2 G/DL (ref 5.7–8.2)
PROTHROMBIN TIME: 19.6 SECONDS (ref 11.6–14.8)
Q-T INTERVAL: 348 MS
QRS DURATION: 76 MS
QTC CALCULATION (BEZET): 435 MS
R AXIS: 4 DEGREES
RBC # BLD AUTO: 2.75 X10(6)UL
RBC PLEURAL FLUID: 3000 /MM3
SODIUM SERPL-SCNC: 134 MMOL/L (ref 136–145)
T AXIS: 133 DEGREES
TOTAL CELLS COUNTED FLD: 100
VENTRICULAR RATE: 94 BPM
WBC # BLD AUTO: 6.5 X10(3) UL (ref 4–11)
WBC # PLR: 170 /MM3

## 2024-08-05 PROCEDURE — 32555 ASPIRATE PLEURA W/ IMAGING: CPT | Performed by: INTERNAL MEDICINE

## 2024-08-05 PROCEDURE — 93975 VASCULAR STUDY: CPT | Performed by: STUDENT IN AN ORGANIZED HEALTH CARE EDUCATION/TRAINING PROGRAM

## 2024-08-05 PROCEDURE — 0W9B3ZZ DRAINAGE OF LEFT PLEURAL CAVITY, PERCUTANEOUS APPROACH: ICD-10-PCS | Performed by: RADIOLOGY

## 2024-08-05 PROCEDURE — 93306 TTE W/DOPPLER COMPLETE: CPT | Performed by: HOSPITALIST

## 2024-08-05 PROCEDURE — 71045 X-RAY EXAM CHEST 1 VIEW: CPT | Performed by: RADIOLOGY

## 2024-08-05 PROCEDURE — 99232 SBSQ HOSP IP/OBS MODERATE 35: CPT | Performed by: HOSPITALIST

## 2024-08-05 PROCEDURE — 99233 SBSQ HOSP IP/OBS HIGH 50: CPT | Performed by: INTERNAL MEDICINE

## 2024-08-05 PROCEDURE — 71045 X-RAY EXAM CHEST 1 VIEW: CPT | Performed by: INTERNAL MEDICINE

## 2024-08-05 RX ORDER — MULTIPLE VITAMINS W/ MINERALS TAB 9MG-400MCG
1 TAB ORAL DAILY
Status: DISCONTINUED | OUTPATIENT
Start: 2024-08-06 | End: 2024-08-07

## 2024-08-05 RX ORDER — METOCLOPRAMIDE HYDROCHLORIDE 5 MG/ML
5 INJECTION INTRAMUSCULAR; INTRAVENOUS EVERY 8 HOURS PRN
Status: DISCONTINUED | OUTPATIENT
Start: 2024-08-05 | End: 2024-08-07

## 2024-08-05 RX ORDER — MAGNESIUM OXIDE 400 MG/1
800 TABLET ORAL ONCE
Status: COMPLETED | OUTPATIENT
Start: 2024-08-05 | End: 2024-08-05

## 2024-08-05 NOTE — PROCEDURES
[facility]   part of [facility]  Procedure Note    Gayla Howe Patient Status:  Inpatient    1940 MRN [id number]   Location [facility] 5NW-A Attending Emmanuel Bennett MD   Hosp Day # 2 PCP Malgorzata Villalta MD     Procedure: Left thoracentesis    Pre-Procedure Diagnosis:  Left pleural effusion    Post-Procedure Diagnosis: Same    Anesthesia:  Local    Findings:  Serous fluid    Specimens: See dictation    Blood Loss:  < 5 cc    Tourniquet Time: None  Complications:  None  Drains:  None    Secondary Diagnosis:  N/A      Anaid Vee MD  2024

## 2024-08-05 NOTE — PAYOR COMM NOTE
--------------  ADMISSION REVIEW     Payor: [employer]  Subscriber #:  [id number]  Authorization Number: [id number]    Admit date: 8/3/24  Admit time:  6:12 PM       REVIEW DOCUMENTATION:     ED Provider Notes          Subjective:   HPI    84-year-old female who resides with her  comes to the emergency department with him for evaluation of decreased appetite, limited oral food and fluid intake over the past 8 days and overall weakness.  This patient has been able to get up to the restroom with a walker with assistance but has been lying in bed all day long.  She denies any abdominal pain.  No fever.  No abdominal surgeries.  She has had occasional loose stools including a couple small ones today that were dark.  Urine has also been concentrated.      Physical Exam     ED Triage Vitals   BP 08/03/24 1300 106/73   Pulse 08/03/24 1300 95   Resp 08/03/24 1300 18   Temp 08/03/24 1358 98 °F (36.7 °C)   Temp src 08/03/24 1358 Temporal   SpO2 08/03/24 1300 96 %   O2 Device 08/03/24 1300 None (Room air)       Current Vitals:   Vital Signs  BP: 100/59  Pulse: 91  Resp: 25  Temp: 98 °F (36.7 °C)  Temp src: Temporal  MAP (mmHg): 72    Oxygen Therapy  SpO2: 95 %  O2 Device: None (Room air)            Physical Exam    General appearance: This is an older female lying on a gurney.  Vital signs were reviewed per nurses notes.  HEENT: Normocephalic atraumatic.  Sclera anicteric.  Oral mucosa is moist.  Neck: No adenopathy or thyromegaly.  Lungs are clear to auscultation.  Heart exam: Normal S1-S2 without extra sounds or murmurs.  Regular rate and rhythm.  Abdomen: NABS.  Flat, soft and nontender without masses or rebound.  Skin is dry without rashes or lesions.  Mildly jaundiced.  Extremities are atraumatic.  Neuroexam: Awake, conversive and moving all 4 extremities well.    ED Course     Labs Reviewed   COMP METABOLIC PANEL (14) - Abnormal; Notable for the following components:       Result Value    Sodium 132 (*)      CO2 18.0 (*)     Creatinine 1.57 (*)     eGFR-Cr 33 (*)      (*)     Bilirubin, Total 6.3 (*)     Albumin 2.9 (*)     Globulin  3.9 (*)     A/G Ratio 0.7 (*)     All other components within normal limits   PROTHROMBIN TIME (PT) - Abnormal; Notable for the following components:    PT 19.9 (*)     INR 1.68 (*)     All other components within normal limits   CBC W/ DIFFERENTIAL - Abnormal; Notable for the following components:    RBC 3.00 (*)     HGB 11.0 (*)     HCT 33.1 (*)     .3 (*)     MCH 36.7 (*)     All other components within normal limits   LIPASE - Normal   CBC WITH DIFFERENTIAL WITH PLATELET   EKG    Rate, intervals and axes as noted on EKG Report.  Rate: 94  Rhythm: Sinus Rhythm  Reading: Possible inferior infarct, age undetermined.  Cannot rule out anterior infarct, age undetermined.  T wave abnormality, consider lateral ischemia.  Abnormal EKG.  Agree with EKG report.  Intravenous access was obtained.  Laboratory studies were drawn.  IV fluids were administered.       CT ABDOMEN+PELVIS(CONTRAST ONLY)(CPT=74177)    Result Date: 8/3/2024  PROCEDURE:  CT ABDOMEN+PELVIS (CONTRAST ONLY) (CPT=74177)  COMPARISON:  None.  INDICATIONS:  Decreased appetite, weakness  TECHNIQUE:  CT scanning was performed from the dome of the diaphragm to the pubic symphysis with non-ionic intravenous contrast material. Post contrast coronal MPR imaging was performed.  Dose reduction techniques were used. Dose information is transmitted to the ACR (American College of Radiology) NRDR (National Radiology Data Registry) which includes the Dose Index Registry.  PATIENT STATED HISTORY:(As transcribed by Technologist)  Patient is here with poor appetite and weakness.    CONTRAST USED:  70cc of Isovue 370  FINDINGS: LUNG BASE:  Partially imaged possibly loculated left pleural effusion with atelectasis/consolidation in the left lower lobe.  Underlying pneumonia is not excluded.  Clinical correlation recommended.   Scattered atelectasis/scarring elsewhere the partially  imaged lungs. LIVER:  Cirrhotic changes in the liver BILIARY:  Unremarkable. SPLEEN:  Unremarkable. PANCREAS:  Unremarkable. ADRENALS:  Unremarkable. KIDNEYS:  Unremarkable. AORTA/VASCULAR:  Mild-to-moderate mixed plaque in the aorta and iliac arteries. RETROPERITONEUM:  Unremarkable. BOWEL/MESENTERY:  Elevation of the left diaphragm.  Large hiatal hernia containing large portions of the stomach as well as some of the colon without obstructive change.  There is diffuse nonspecific colonic wall thickening with nonspecific colitis not excluded.  Clinical correlation recommended.  Uncomplicated colonic diverticulosis.  Moderate ascites in the abdomen and pelvis along with mild mesenteric edema.  No free air.  No large or small bowel dilatation. ABDOMINAL WALL:  Nonspecific soft tissue edema. PELVIC ORGANS:  Calcified uterine fibroid.  Vascular calcifications.  Atrophic ovaries.  Nondistended urinary bladder. LYMPH NODES:  No lymphadenopathy in the abdomen or pelvis. BONES:  Degenerative changes in the spine and hips. OTHER:  None.            CONCLUSION:   1. Partially imaged possibly loculated left pleural effusion with atelectasis/consolidation in the left lower lobe.  Underlying pneumonia is not excluded.  Clinical correlation recommended.   2. Cirrhotic changes in the liver with moderate ascites in the abdomen and pelvis.  Mild mesenteric edema.  3. Uncomplicated colonic diverticulosis.  4. Large hiatal hernia.   Please see above for further details.  LOCATION:  [id number]   Dictated by (CST): Barney Gillette MD on 8/03/2024 at 3:31 PM     Finalized by (CST): Barney Gillette MD on 8/03/2024 at 3:35 PM       XR CHEST AP PORTABLE  (CPT=71045)    Result Date: 8/3/2024  PROCEDURE:  XR CHEST AP PORTABLE  (CPT=71045)  TECHNIQUE:  AP chest radiograph was obtained.  COMPARISON:  [facility] , XR, XR CHEST PA + LAT CHEST (CPT=71046), 12/17/2019, 7:24 PM.  INDICATIONS:  Decreased  appetite, weakness  PATIENT STATED HISTORY: (As transcribed by Technologist)  Patient has had a loss of appetite and hasnt had a meal in 7-10 days.    FINDINGS:  The patient is partially rotated to the left midline.  Dense consolidation in the left lower lobe with associated small left pleural effusion is concerning for pneumonia.  Clinical correlation recommended along with follow-up to confirm resolution.  Cardiomegaly with normal pulmonary vascularity.  No pneumothorax.  Degenerative changes the spine with rightward curvature.            CONCLUSION:  Dense consolidation in the left lower lobe with associated small left pleural effusion is concerning for pneumonia.  Clinical correlation recommended along with follow-up to confirm resolution.    LOCATION:  [id number]      Dictated by (CST): Barney Gillette MD on 8/03/2024 at 2:16 PM     Finalized by (CST): Barney Gillette MD on 8/03/2024 at 2:17 PM       I personally reviewed the images myself and went over results with patient.    I viewed the chest x-ray and the CT abdomen and pelvis films myself.  There is evidence of a left lower lobe infiltrate likely the cause of the patient's symptoms.  There are some cirrhotic changes noted in the liver on CT imaging but no evidence of pancreatic tumor.    Intravenous antibiotics were initiated for community-acquired pneumonia with no recent antibiotic use.  Rocephin and Zithromax were given as well as fluid bolus.  Lactic acid level is pending.    Case was conveyed to [place] hospitalist Dr. Webber.  Test results and treatment plan were discussed with the patient and her  including hospitalization for IV antibiotics and rehydration.        MDM      #1.  Community-acquired pneumonia of the left lower lobe.  Antibiotics initiated.  No evidence of pancreatic mass to explain the patient's jaundice.  This is likely secondary to infection.  No other intra-abdominal pathology.    2.  Hyponatremia.  3.  Hide anemia.  4.   Metabolic acidosis.  5.  Jaundice.  Please refer to #1.  Admission disposition: 8/3/2024  3:19 PM      Disposition and Plan     Clinical Impression:  1. Community acquired pneumonia of left lower lobe of lung    2. Hyponatremia    3. Anemia, unspecified type    4. Metabolic acidosis         Disposition:  Admit  8/3/2024  3:19 pm      History and Physical            Gayla Howe Patient Status:  Emergency    1940 MRN [id number]   Location [facility] EMERGENCY DEPARTMENT Attending No att. providers found   Hosp Day # 0 PCP Malgorzata Villalta MD      Chief Complaint: weakness, dec appetite         Subjective:  History of Present Illness:      Gayla Howe is a 84 year old female with a PMH of HTN who presents with weakness, dec appetite.  Symptoms started about 1 week ago.  Denies any fever, chills, n/v.  She does report diarrhea, nonbloody.  No other acute complaints.                Assessment & Plan:  #Loculated left pleural effusion  Noted on CT  Pulm consult     #Pneumonia  CT reviewed  CXR with dense LLL effusion   Afebrile, no leukocytosis, sats good on RA  LA 2.7  Unasyn, azithro  F/u cultures      #Cirrhotic features on CT  Denies alcohol use  INR elevated, Bili 6.3  Consult GI    #CKD 3B  Cr close to baseline, monitor  Avoid nephrotoxic agents     #Hyponatremia  Na 132, fluids     #Macrocytic anemia  Hg 11, check anemia profile              Plan of care discussed with patient, er physician, nurse      Emmanuel Bennett MD         Pulmonary/Critical Care Consult note           Gayla Howe Patient Status:  Inpatient    1940 MRN [id number]   Location [facility] 5NW-A Attending Emmanuel Bennett MD   Hosp Day # 1 PCP Malgorzata Villalta MD      Reason for Consultation:  Acute hypoxemia and pneumonia     History of Present Illness:  Gayla Howe is a a(n) 84 year old female never smoker with history of hypertension who presented to the emergency department with complaints of decreased  appetite and weakness for the last 7-10 days .     Chest x-ray performed showed pneumonia and patient is noted to be hypoxic so pulmonary oxygen was obtained for further evaluation management.     She admits to mild cough, some shortness of breath. She has shad frequent diarrhea      Denies any fever, chills, nausea or vomiting.     She has pet dogs         Assessment:  Acute hypoxic respiratory failure: Requiring supplemental oxygen 2 L/min by nasal cannula  Left lower lobe l atelectasis with loss of volume and left lower lobe/consolidation/differential diagnosis would include community-acquired pneumonia although cannot rule out endobronchial obstruction with a mucous plug or neoplastic process etc.  Loculated left pleural effusion, this could represent parapneumonic effusion versus translocation of ascites to left pleural space versus neoplastic process  Hypertension  Cirrhosis of liver on CT abdomen   Transaminitis: Mildly elevated AST on labs  Mild to moderate mixed plaque in the aorta and iliac arteries  AMIRA/CKD  Anemia  Hyperbilirubinemia/jaundice  Hyponatremia           Plan:  Wean FiO2 to keep oxygen saturations between 90% to 92%  Continue current antibiotics  Check urine strep pneumonia and Legionella antigen pending  Left thoracentesis by interventional radiology and send sample for culture and cytology  Ascites drainage per gastroenterology  Would need follow-up CT chest to ensure resolution of left lower lobe pneumonia/pleural effusion in 6 to 8 weeks  DVT prophylaxis:--  GI prophylaxis: Protonix IV 40 mg daily    Will follow for further recommendations     Thank You for allowing me to participate in this patient's care      Prabhu Carbajal MD      8/5 PULMONARY:    SUBJECTIVE/Interval history:  No acute events overnight, she feels the same today, still with weakness. No pain. Minimal cough. No dyspnea      Assessment:  Acute hypoxic respiratory failure due to pneumonia, pleural effusion  LLL  consolidation/pneumonia, community acquired  Left pleural effusion, possible parapneumonic vs empyema  Cirrhosis of liver on CT abdomen   Transaminitis: Mildly elevated AST on labs  Hypertension  Mild to moderate mixed plaque in the aorta and iliac arteries  Anemia  Hyperbilirubinemia/jaundice  Hyponatremia     Plan:  Monitor respiratory status, goal sats >89%  Continue empiric abx -unasyn, azithromycin day 3; await cx results  Repeat CXR today and consider thoracentesis vs chest tube if effusion persists/worsens  She will require outpatient f/u CT chest in 2-3months     Sacha Alonso MD     MEDICATIONS ADMINISTERED IN LAST 1 DAY:  ampicillin-sulbactam (Unasyn) 3 g in sodium chloride 0.9% 100mL IVPB-ADD       Date Action Dose Route User    8/5/2024 0817 New Bag 3 g Intravenous Marisol Bates RN    8/4/2024 2045 New Bag 3 g Intravenous Amee Ireland RN          azithromycin (Zithromax) 500 mg in sodium chloride 0.9% 250mL IVPB premix       Date Action Dose Route User    8/4/2024 1615 New Bag 500 mg Intravenous Jose Carlos Sheridan RN          guaiFENesin ER (Mucinex) 12 hr tab 600 mg       Date Action Dose Route User    8/4/2024 2045 Given 600 mg Oral Amee Ireland RN          magnesium oxide (Mag-Ox) tab 800 mg       Date Action Dose Route User    8/4/2024 1323 Given 800 mg Oral Jose Carlos Sheridan RN          pantoprazole (Protonix) 40 mg in sodium chloride 0.9% PF 10 mL IV push       Date Action Dose Route User    8/5/2024 0817 Given 40 mg Intravenous Marisol Bates RN    8/4/2024 1615 Given 40 mg Intravenous Jose Carlos Sheridan RN          potassium chloride (Klor-Con M20) tab 40 mEq       Date Action Dose Route User    8/4/2024 1324 Given 40 mEq Oral Jose Carlos Sheridan RN            Vitals (last day)       Date/Time Temp Pulse Resp BP SpO2 Weight O2 Device O2 Flow Rate (L/min) [facility]    08/05/24 1120 -- 86 -- -- 93 % -- -- --     08/05/24 0734 97.3 °F (36.3 °C) 79 -- 96/52 94 % --  Nasal cannula 2 L/min ERYN    08/05/24 0500 97 °F (36.1 °C) 80 -- 99/63 -- -- -- -- DC    08/05/24 0500 -- -- 14 -- 93 % -- Nasal cannula 2 L/min ZS    08/04/24 2357 97.8 °F (36.6 °C) 75 -- 96/60 -- -- -- -- DC    08/04/24 2225 -- -- -- -- 95 % -- Nasal cannula 2 L/min ZS    08/04/24 2128 -- -- -- -- -- -- Nasal cannula 2 L/min KT    08/04/24 2045 -- -- 16 -- -- -- -- -- ZS    08/04/24 2039 97.6 °F (36.4 °C) 82 -- 104/60 95 % -- -- -- DC    08/04/24 1625 97.8 °F (36.6 °C) 89 20 102/58 98 % -- Nasal cannula 2 L/min MARCELLE    08/04/24 1215 98 °F (36.7 °C) 80 19 102/64 100 % -- Nasal cannula 2 L/min MARCELLE    08/04/24 0834 97.9 °F (36.6 °C) 79 19 108/61 94 % -- Nasal cannula 2 L/min MARCELLE    08/04/24 0438 98.4 °F (36.9 °C) 75 18 93/54 92 % -- Nasal cannula 2 L/min KM

## 2024-08-05 NOTE — PLAN OF CARE
Pt AOx4. VSS on RA. Tele, SRNash ENashp. voids. Vte on hold for procedures. Denies pain. Max assist. Pending PT eval. Voids. Purewick and briefed. Saline locked. Iv abx. Thoracentesis today. Plan for paracentesis tomorrow. Cardiac diet. Npo at midnight. Pt and family updated on poc, no further needs at this time.       Problem: Patient/Family Goals  Goal: Patient/Family Long Term Goal  Description: Patient's Long Term Goal:     Interventions:  -   - See additional Care Plan goals for specific interventions  Outcome: Progressing  Goal: Patient/Family Short Term Goal  Description: Patient's Short Term Goal:  8/3 NOC: sleep  8/4 NOC: sleep, have IVF stopped for night  8/5 AM: get all procedures done today  Interventions:   - cluster care  - See additional Care Plan goals for specific interventions  Outcome: Progressing     Problem: CARDIOVASCULAR - ADULT  Goal: Maintains optimal cardiac output and hemodynamic stability  Description: INTERVENTIONS:  - Monitor vital signs, rhythm, and trends  - Monitor for bleeding, hypotension and signs of decreased cardiac output  - Evaluate effectiveness of vasoactive medications to optimize hemodynamic stability  - Monitor arterial and/or venous puncture sites for bleeding and/or hematoma  - Assess quality of pulses, skin color and temperature  - Assess for signs of decreased coronary artery perfusion - ex. Angina  - Evaluate fluid balance, assess for edema, trend weights  Outcome: Progressing

## 2024-08-05 NOTE — PROGRESS NOTES
Inpatient Follow up Note    Gayla Howe Patient Status:  Inpatient    1940 MRN [id number]   Location [facility] 5NW-A Attending Emmanuel Bennett MD   Hosp Day # 2 PCP Malgorzata Villalta MD     Reason for Consultation   Cirrhosis, abnormal LFTs     Subjective   Reports ongoing decreased appetite, denies heavy alcohol use, history of liver issues. No GI bleeding/confusion.             Objective:     BP 96/52 (BP Location: Left arm)   Pulse 86   Temp 97.3 °F (36.3 °C) (Axillary)   Resp 14   Ht 5' 2\" (1.575 m)   Wt 135 lb (61.2 kg)   SpO2 93%   BMI 24.69 kg/m²   Gen: AAOx3, no asterixis  CV: RRR with normal S1 / S2  Resp: CTA bilaterally  Abd: (+)BS, soft, non-tender, distended; no rebound or guarding  Ext: No edema or cyanosis  Skin: Warm and dry     Labs/Imaging     LABRCNTIP[PGLU:5@  Recent Labs   Lab 24  1438 24  0838   INR 1.68* 1.64*         Recent Labs   Lab 24  1313 24  1048 24  0838   WBC 9.2 8.9 6.5   HGB 11.0* 10.4* 9.9*   .0 195.0 197.0       Recent Labs   Lab 24  1313 24  1048 24  0838   * 132* 134*   K 5.1 3.2* 3.6  3.6    102 105   CO2 18.0* 19.0* 19.0*   BUN 20 18 15   CREATSERUM 1.57* 1.41* 1.35*       Recent Labs   Lab 24  1313 24  1048 24  0838   ALT 36 33 34   * 101* 101*     CT ABDOMEN+PELVIS(CONTRAST ONLY)(CPT=74177)    Result Date: 8/3/2024  CONCLUSION:   1. Partially imaged possibly loculated left pleural effusion with atelectasis/consolidation in the left lower lobe.  Underlying pneumonia is not excluded.  Clinical correlation recommended.   2. Cirrhotic changes in the liver with moderate ascites in the abdomen and pelvis.  Mild mesenteric edema.  3. Uncomplicated colonic diverticulosis.  4. Large hiatal hernia.   Please see above for further details.  LOCATION:  [id number]   Dictated by (CST): Barney Gillette MD on 2024 at 3:31 PM     Finalized by (CST): Barney Gillette MD  on 8/03/2024 at 3:35 PM       XR CHEST AP PORTABLE  (CPT=71045)    Result Date: 8/3/2024  CONCLUSION:  Dense consolidation in the left lower lobe with associated small left pleural effusion is concerning for pneumonia.  Clinical correlation recommended along with follow-up to confirm resolution.    LOCATION:  [id number]      Dictated by (CST): Barney Gillette MD on 8/03/2024 at 2:16 PM     Finalized by (CST): Barney Gillette MD on 8/03/2024 at 2:17 PM      AST   Date/Time Value Ref Range Status   08/05/2024 08:38  (H) <34 U/L Final     ALT   Date/Time Value Ref Range Status   08/05/2024 08:38 AM 34 10 - 49 U/L Final     BUN   Date/Time Value Ref Range Status   08/05/2024 08:38 AM 15 9 - 23 mg/dL Final              Assessment   Gayla Howe is a 84 year old female with history of hypertension that presented to the ER with decreased appetite and also found to have elevated liver enzymes and possible cirrhosis. The etiology of her cirrhosis is not known currently.     Suspected cirrhosis with cirrhotic appearing morphology CT along with ascites. She is noted to have elevated INR as well.  Elevated liver enzymes-May be secondary to suspected cirrhosis but also due to infectious etiology with pneumonia.  Liver enzymes are currently improving.     Plan   -low sodium diet, ok to resume after paracentesis  -US guided paracentesis with fluid studies  -f/u chronic liver disease workup  -daily pantoprazole  -repeat CBC, CMP, INR 8/6am  -will need to begin diuretics prior to DC, will consider starting on 8/6  -will consider EGD while patient pending clinical course  -f/u abd US with doppler       Mina Correa MD  11:30 AM  8/5/2024  [facility]  [phone]

## 2024-08-05 NOTE — PROGRESS NOTES
[facility]   part of [facility]     Hospitalist Progress Note     Gayla Howe Patient Status:  Inpatient    1940 MRN [id number]   [facility] 5NW-A Attending Augusto Easley MD   Hosp Day # 2 PCP Malgorzata Villalta MD     Chief Complaint: weakness, dec appetite     Subjective:     Patient not feeling better today.  Still weak, tired, with mild diarrhea.  No other acute complaints .     Objective:    Review of Systems:   A comprehensive review of systems was completed; pertinent positive and negatives stated in subjective.    Vital signs:  Temp:  [97 °F (36.1 °C)-97.8 °F (36.6 °C)] 97.2 °F (36.2 °C)  Pulse:  [75-89] 79  Resp:  [14-20] 20  BP: ()/(52-64) 98/64  SpO2:  [93 %-98 %] 98 %    Physical Exam:    General: No acute distress, pleasant, thin  Respiratory: Dec to left base, rhonchi   Cardiovascular: S1, S2, regular rate and rhythm  Abdomen: Soft, Non-tender, non-distended, positive bowel sounds  Neuro: No new focal deficits.   Extremities: No edema    Diagnostic Data:    Labs:  Recent Labs   Lab 24  1313 24  1438 24  1048 24  0838   WBC 9.2  --  8.9 6.5   HGB 11.0*  --  10.4* 9.9*   .3*  --  107.3* 108.0*   .0  --  195.0 197.0   INR  --  1.68*  --  1.64*       Recent Labs   Lab 24  1313 24  1048 24  0838   GLU 90 87 77   BUN 20 18 15   CREATSERUM 1.57* 1.41* 1.35*   CA 9.6 9.3 9.2   ALB 2.9* 2.6* 2.5*   * 132* 134*   K 5.1 3.2* 3.6  3.6    102 105   CO2 18.0* 19.0* 19.0*   ALKPHO 131 128 126   * 101* 101*   ALT 36 33 34   BILT 6.3* 4.7* 3.8*   TP 6.8 6.3 6.2       Estimated Creatinine Clearance: 25 mL/min (A) (based on SCr of 1.35 mg/dL (H)).    No results for input(s): \"TROP\", \"TROPHS\", \"CK\" in the last 168 hours.    Recent Labs   Lab 24  1438 24  0838   PTP 19.9* 19.6*   INR 1.68* 1.64*                  Microbiology    Hospital Encounter on 24   1. Blood Culture     Status: None  (Preliminary result)    Collection Time: 08/03/24  4:32 PM    Specimen: Blood,peripheral   Result Value Ref Range    Blood Culture Result No Growth 1 Day N/A         Imaging: Reviewed in Epic.    Medications:    magnesium oxide  800 mg Oral Once    ampicillin-sulbactam  3 g Intravenous q12h    pantoprazole  40 mg Intravenous Daily    [Held by provider] enoxaparin  40 mg Subcutaneous Daily    guaiFENesin ER  600 mg Oral BID    azithromycin  500 mg Intravenous Q24H       Assessment & Plan:      #Loculated left pleural effusion  Noted on CT  Pulm consult  Repeat CXR today - persistent effusion      #Pneumonia  CT reviewed  CXR with dense LLL effusion   Afebrile, no leukocytosis, sats good on RA  LA 2.7  Unasyn, azithro  F/u cultures      #Cirrhotic features on CT  Denies alcohol use  INR elevated, Bili 6.3 -> 4.7  GI following, workup underway, may need EGD    #Ascites  Paracentesis today     #LE swelling, SOB  Check Echo     #CKD 3B  Cr close to baseline, monitor  Avoid nephrotoxic agents     #Hyponatremia  Na 134     #Macrocytic anemia  Hg 11 -> 10.4, check anemia profile     #medical debility/deconditioning  PT/OT         Emmanuel Bennett MD    Supplementary Documentation:     Quality:  DVT Mechanical Prophylaxis:     Early ambuation  DVT Pharmacologic Prophylaxis   Medication    [Held by provider] enoxaparin (Lovenox) 40 MG/0.4ML SUBQ injection 40 mg                Code Status: Full Code  Street: External urinary catheter in place  Street Duration (in days):   Central line:    HEIKE:     Discharge is dependent on: clinical recovery   At this point Ms. Howe is expected to be discharge to: Home    The 21st Century Cures Act makes medical notes like these available to patients in the interest of transparency. Please be advised this is a medical document. Medical documents are intended to carry relevant information, facts as evident, and the clinical opinion of the practitioner. The medical note is intended as peer to peer  communication and may appear blunt or direct. It is written in medical language and may contain abbreviations or verbiage that are unfamiliar.             **Certification      PHYSICIAN Certification of Need for Inpatient Hospitalization - Initial Certification    Patient will require inpatient services that will reasonably be expected to span two midnight's based on the clinical documentation in H+P.   Based on patients current state of illness, I anticipate that, after discharge, patient will require TBD.

## 2024-08-05 NOTE — IMAGING NOTE
Called eugenia Sung RN and instructions given to have labs drawn and clear liquid status. Pt is consentable.  Appt time given.

## 2024-08-05 NOTE — IMAGING NOTE
Gayla CAMILLE Howe arrives for image guided thoracentesis. Informed consent obtained by Dr. Vee. Thoracentesis - left completed. Obtained biopsy. Specimen sent to Pathology.  XR Chest completed - results pending.  Presently denies pain. SBAR given to SCOTT Damon. Patient transported back to room 506.

## 2024-08-05 NOTE — PHYSICAL THERAPY NOTE
Returned for PT eval as agreed upon on 8/4.  Pt again refusing stating PCT about to change pt.  Offered to return later this date and pt continues to refuse stating 2 tests for today.  RN, PCT, and MD made aware of continued refusal.

## 2024-08-05 NOTE — PLAN OF CARE
Received pt A&Ox4, forgetful.  on 2L, RA is baseline. Weaning as tolerated. NSR on tele. Lovenox for VTE prophylaxis - held in MAR for procedures. Tolerating diet, NPO at midnight. Voids via purwick. Up max assist, PT to see pt in AM. Denies Pain. Receiving IV abx. C-diff negative, isolation discontinued. Okay to hold IVF overnight. Plan of care continues, no further needs at this time.     Problem: Patient/Family Goals  Goal: Patient/Family Long Term Goal  Description: Patient's Long Term Goal: go home    Interventions:  - See additional Care Plan goals for specific interventions  Outcome: Progressing  Goal: Patient/Family Short Term Goal  Description: Patient's Short Term Goal:  8/3 NOC: sleep  8/4 NOC: sleep, have IVF stopped for night    Interventions:   - cluster care  - See additional Care Plan goals for specific interventions  Outcome: Progressing     Problem: CARDIOVASCULAR - ADULT  Goal: Maintains optimal cardiac output and hemodynamic stability  Description: INTERVENTIONS:  - Monitor vital signs, rhythm, and trends  - Monitor for bleeding, hypotension and signs of decreased cardiac output  - Evaluate effectiveness of vasoactive medications to optimize hemodynamic stability  - Monitor arterial and/or venous puncture sites for bleeding and/or hematoma  - Assess quality of pulses, skin color and temperature  - Assess for signs of decreased coronary artery perfusion - ex. Angina  - Evaluate fluid balance, assess for edema, trend weights  Outcome: Progressing     Problem: RESPIRATORY - ADULT  Goal: Achieves optimal ventilation and oxygenation  Description: INTERVENTIONS:  - Assess for changes in respiratory status  - Assess for changes in mentation and behavior  - Position to facilitate oxygenation and minimize respiratory effort  - Oxygen supplementation based on oxygen saturation or ABGs  - Provide Smoking Cessation handout, if applicable  - Encourage broncho-pulmonary hygiene including cough, deep  breathe, Incentive Spirometry  - Assess the need for suctioning and perform as needed  - Assess and instruct to report SOB or any respiratory difficulty  - Respiratory Therapy support as indicated  - Manage/alleviate anxiety  - Monitor for signs/symptoms of CO2 retention  Outcome: Progressing     Problem: GASTROINTESTINAL - ADULT  Goal: Maintains adequate nutritional intake (undernourished)  Description: INTERVENTIONS:  - Monitor percentage of each meal consumed  - Identify factors contributing to decreased intake, treat as appropriate  - Assist with meals as needed  - Monitor I&O, WT and lab values  - Obtain nutritional consult as needed  - Optimize oral hygiene and moisture  - Encourage food from home; allow for food preferences  - Enhance eating environment  Outcome: Progressing     Problem: SKIN/TISSUE INTEGRITY - ADULT  Goal: Skin integrity remains intact  Description: INTERVENTIONS  - Assess and document risk factors for pressure ulcer development  - Assess and document skin integrity  - Monitor for areas of redness and/or skin breakdown  - Initiate interventions, skin care algorithm/standards of care as needed  Outcome: Progressing  Goal: Incision(s), wounds(s) or drain site(s) healing without S/S of infection  Description: INTERVENTIONS:  - Assess and document risk factors for pressure ulcer development  - Assess and document skin integrity  - Assess and document dressing/incision, wound bed, drain sites and surrounding tissue  - Implement wound care per orders  - Initiate isolation precautions as appropriate  - Initiate Pressure Ulcer prevention bundle as indicated  Outcome: Progressing

## 2024-08-05 NOTE — OCCUPATIONAL THERAPY NOTE
OT orders received and pt chart reviewed.  Pt refusing stating PCT about to change pt.  Offered to return later this date and pt continues to refuse stating 2 tests for today.

## 2024-08-05 NOTE — DIETARY NOTE
[facility]   part of [facility]  NUTRITION ASSESSMENT    Unable to diagnose malnutrition criteria at this time.    NUTRITION INTERVENTION:    Meal and Snacks - Change to 2 gm Na Diet as tolerated; monitor patient po intake. Encourage adequate po of appropriate diet.  Medical Food Supplements - RD added Ensure Plus HP BID. Rationale/use for oral supplements discussed.  Vitamin and Mineral Supplements - Recommend adding Multivitamin with minerals  Coordination of Nutrition Care - Recommend GI/Surgery consult for nutrition support/diet advancement     PATIENT STATUS: 84 year old female admitted on 8/3 presents with CAP and L PE s/p L thoracentesis today. Pt screened d/t MST score 2. Attempted to visit pt at bedside but pt getting US. Per chart review, pt having decreased appetite for 1-2 weeks PTA and apparently was only eating 1 meal/day prior to that. No nausea, vomiting or constipation noted but had episode of diarrhea. No chewing or swallowing difficulties and NKFA noted. GI following with plan for paracentesis tmrw. Will add ONS to optimize intake and continue to monitor and follow up as appropriate.    PMH:  has a past medical history of Essential hypertension and High blood pressure.    ANTHROPOMETRICS:  Ht: 157.5 cm (5' 2\")  Wt: 61.2 kg (135 lb).   BMI: Body mass index is 24.69 kg/m².  IBW: 50 kg    WEIGHT HISTORY:   Patient Weight(s) for the past 336 hrs:   Weight   08/03/24 1530 61.2 kg (135 lb)       Wt Readings from Last 10 Encounters:   08/03/24 61.2 kg (135 lb)   05/10/22 65.8 kg (145 lb)   09/28/21 65.8 kg (145 lb)   08/31/21 65.8 kg (145 lb)   08/17/21 65.8 kg (145 lb)   07/27/21 66.7 kg (147 lb)   07/23/21 67.1 kg (147 lb 14.9 oz)   02/10/20 67.1 kg (148 lb)   01/10/20 66.2 kg (146 lb)   01/03/20 65.8 kg (145 lb)        NUTRITION:  Diet:       Procedures    Cardiac diet Cardiac; Is Patient on Accuchecks? No    NPO        Percent Meals Eaten (last 3 days)       Date/Time Percent Meals Eaten  (%)    08/04/24 1215 50 %            Food Allergies: No  Cultural/Ethnic/Evangelical Preferences Addressed: Yes    GI SYSTEM REVIEW: diarrhea; last BM 8/4  Skin/Wounds: WNL    NUTRITION RELATED PHYSICAL FINDINGS:     1. Body Fat/Muscle Mass: unable to assess at this time as pt out of room for testing/procedure. Will attempt at follow up or as able     2. Fluid Accumulation: none per RN documentation     NUTRITION PRESCRIPTION: 61.2 kg  Calories: 3764-8127 calories/day (25-30 kcal/kg)  Protein: 61-92 grams protein/day (1.0-1.5 gm/kg)  Fluid: ~1 ml/kcal or per MD discretion    NUTRITION DIAGNOSIS/PROBLEM:  Inadequate oral intake related to insufficient appetite resulting in inadequate nutrition intake as evidenced by documented/reported insufficient oral intake    MONITOR AND EVALUATE/NUTRITION GOALS:  PO intake of 75% of meals TID - New  PO intake of 75% of oral nutrition supplement/s - New  Weight stable within 1 to 2 lbs during admission - New      MEDICATIONS:  Abx, Mg oxide, protonix    LABS:  Mg 1.5    Pt is at Moderate nutrition risk    Shweta Frost RD, LDN, CNSC  Clinical Dietitian  Spectra: [phone]

## 2024-08-05 NOTE — IMAGING NOTE
Called eugenia Damon RN and instructions given to have labs drawn and NPO status. Pt is consentable. Will hold blood thinners per protocol.  Appt time given for 1330 today.

## 2024-08-05 NOTE — PROGRESS NOTES
[facility] Progress Note     SUBJECTIVE/Interval history:  No acute events overnight, she feels the same today, still with weakness. No pain. Minimal cough. No dyspnea    Review of Systems:   A comprehensive 14 point review of systems was completed.   Pertinent positives and negatives noted in the HPI.    Medications  Reviewed personally   ampicillin-sulbactam  3 g Intravenous q12h    azithromycin  500 mg Oral Q24H    pantoprazole  40 mg Intravenous Daily    [Held by provider] enoxaparin  40 mg Subcutaneous Daily    guaiFENesin ER  600 mg Oral BID    azithromycin  500 mg Intravenous Q24H       acetaminophen    melatonin    glycerin-hypromellose-    sodium chloride    polyethylene glycol (PEG 3350)    sennosides    bisacodyl    fleet enema    ondansetron    metoclopramide    benzonatate    OBJECTIVE:  Vitals:    08/04/24 2225 08/04/24 2357 08/05/24 0500 08/05/24 0734   BP:  96/60 99/63 96/52   BP Location:  Left arm Left arm Left arm   Pulse:  75 80 79   Resp:   14    Temp:  97.8 °F (36.6 °C) 97 °F (36.1 °C) 97.3 °F (36.3 °C)   TempSrc:  Oral Oral Axillary   SpO2: 95%  93% 94%   Weight:       Height:           Vital signs in last 24 hours:  Blood pressure 96/52, pulse 79, temperature 97.3 °F (36.3 °C), temperature source Axillary, resp. rate 14, height 5' 2\" (1.575 m), weight 135 lb (61.2 kg), SpO2 94%.     Intake/Output:  I/O last 3 completed shifts:  In: 60 [P.O.:60]  Out: 100 [Urine:100]       Physical Exam:  General: Appears alert, comfortable. No acute distress  Neurologic:No focal deficits.  Alert.  Oriented.  Lungs:rare rhonchi. No wheeze. No distress  Heart:RRR no m  Abdomen:Soft, non-tender.  No masses.  No guarding.  No rebound.  Extremities:no edema    Lab Data Review:   Recent Labs   Lab 08/03/24  1313 08/04/24  1048   GLU 90 87   BUN 20 18   CREATSERUM 1.57* 1.41*   CA 9.6 9.3   * 132*   K 5.1 3.2*    102   CO2 18.0* 19.0*     Recent Labs   Lab  08/03/24  1313 08/04/24  1048   RBC 3.00* 2.88*   HGB 11.0* 10.4*   HCT 33.1* 30.9*   .3* 107.3*   MCH 36.7* 36.1*   MCHC 33.2 33.7   RDW 16.2 16.2   NEPRELIM 6.66 5.92   WBC 9.2 8.9   .0 195.0     No results for input(s): \"BNP\" in the last 168 hours.  No results for input(s): \"TROP\", \"CK\" in the last 168 hours.  Recent Labs   Lab 08/03/24  1438   INR 1.68*     No results for input(s): \"ABGPHT\", \"VBSAXJ8P\", \"TVYXX5O\", \"ABGHCO3\", \"ABGBE\", \"TEMP\", \"PETER\", \"SITE\", \"DEV\", \"THGB\" in the last 168 hours.    Invalid input(s): \"UOX77TJW\", \"CHOB\"    Other Labs:  Interval Culture Data:   Hospital Encounter on 08/03/24   1. Blood Culture     Status: None (Preliminary result)    Collection Time: 08/03/24  4:32 PM    Specimen: Blood,peripheral   Result Value Ref Range    Blood Culture Result No Growth 1 Day N/A     No results for input(s): \"COLORUR\", \"CLARITY\", \"SPECGRAVITY\", \"GLUUR\", \"BILUR\", \"KETUR\", \"BLOODURINE\", \"PHURINE\", \"PROUR\", \"UROBILINOGEN\", \"NITRITE\", \"LEUUR\", \"WBCUR\", \"RBCUR\", \"BACUR\", \"EPIUR\" in the last 168 hours.    Interval Radiology:   Reviewed personally  CT ABDOMEN+PELVIS(CONTRAST ONLY)(CPT=74177)    Result Date: 8/3/2024  CONCLUSION:   1. Partially imaged possibly loculated left pleural effusion with atelectasis/consolidation in the left lower lobe.  Underlying pneumonia is not excluded.  Clinical correlation recommended.   2. Cirrhotic changes in the liver with moderate ascites in the abdomen and pelvis.  Mild mesenteric edema.  3. Uncomplicated colonic diverticulosis.  4. Large hiatal hernia.   Please see above for further details.  LOCATION:  [id number]   Dictated by (CST): Barney Gillette MD on 8/03/2024 at 3:31 PM     Finalized by (CST): Barney Gillette MD on 8/03/2024 at 3:35 PM       XR CHEST AP PORTABLE  (CPT=71045)    Result Date: 8/3/2024  CONCLUSION:  Dense consolidation in the left lower lobe with associated small left pleural effusion is concerning for pneumonia.  Clinical correlation  recommended along with follow-up to confirm resolution.    LOCATION:  [id number]      Dictated by (CST): Barney Gillette MD on 8/03/2024 at 2:16 PM     Finalized by (CST): Barney Gillette MD on 8/03/2024 at 2:17 PM        Assessment:  Acute hypoxic respiratory failure due to pneumonia, pleural effusion  LLL consolidation/pneumonia, community acquired  Left pleural effusion, possible parapneumonic vs empyema  Cirrhosis of liver on CT abdomen   Transaminitis: Mildly elevated AST on labs  Hypertension  Mild to moderate mixed plaque in the aorta and iliac arteries  Anemia  Hyperbilirubinemia/jaundice  Hyponatremia     Plan:  Monitor respiratory status, goal sats >89%  Continue empiric abx -unasyn, azithromycin day 3; await cx results  Repeat CXR today and consider thoracentesis vs chest tube if effusion persists/worsens  She will require outpatient f/u CT chest in 2-3months    Sacha Alonso MD

## 2024-08-06 ENCOUNTER — APPOINTMENT (OUTPATIENT)
Dept: ULTRASOUND IMAGING | Facility: HOSPITAL | Age: 84
End: 2024-08-06
Attending: STUDENT IN AN ORGANIZED HEALTH CARE EDUCATION/TRAINING PROGRAM
Payer: MEDICARE

## 2024-08-06 LAB
A-1-ANTITRYPSIN: 175 MG/DL
ACTIN SMOOTH MUSCLE AB: 9 UNITS
ALBUMIN FLD-MCNC: <1 G/DL
ALBUMIN SERPL-MCNC: 2.3 G/DL (ref 3.2–4.8)
ALBUMIN/GLOB SERPL: 0.7 {RATIO} (ref 1–2)
ALP LIVER SERPL-CCNC: 112 U/L
ALT SERPL-CCNC: 30 U/L
ANION GAP SERPL CALC-SCNC: 8 MMOL/L (ref 0–18)
AST SERPL-CCNC: 94 U/L (ref ?–34)
BASOPHILS NFR PRT: 0 %
BILIRUB SERPL-MCNC: 3.5 MG/DL (ref 0.2–1.1)
BUN BLD-MCNC: 17 MG/DL (ref 9–23)
CALCIUM BLD-MCNC: 9 MG/DL (ref 8.7–10.4)
CENTROMERE IGG SER-ACNC: 8.5 U/ML
CHLORIDE SERPL-SCNC: 106 MMOL/L (ref 98–112)
CMV IGG AB: 3 U/ML
CMV IGM AB: <30 AU/ML
CO2 SERPL-SCNC: 20 MMOL/L (ref 21–32)
COLOR FLD: YELLOW
CREAT BLD-MCNC: 1.37 MG/DL
EGFRCR SERPLBLD CKD-EPI 2021: 38 ML/MIN/1.73M2 (ref 60–?)
ENA JO1 AB SER IA-ACNC: 0.6 U/ML
ENA RNP IGG SER IA-ACNC: 1.1 U/ML
ENA SCL70 IGG SER IA-ACNC: 1.5 U/ML
ENA SM IGG SER IA-ACNC: 0.9 U/ML
ENA SS-A IGG SER IA-ACNC: 0.5 U/ML
ENA SS-B IGG SER IA-ACNC: 0.9 U/ML
EOSINOPHIL NFR PRT: 0 %
GLOBULIN PLAS-MCNC: 3.4 G/DL (ref 2–3.5)
GLUCOSE BLD-MCNC: 82 MG/DL (ref 70–99)
LDH SERPL L TO P-CCNC: 179 U/L
LYMPHOCYTES NFR PRT: 46 %
M2 MITOCHONDRIAL AB: <20 UNITS
MAGNESIUM SERPL-MCNC: 1.5 MG/DL (ref 1.6–2.6)
MESOTHL CELL NFR PRT: 4 %
MONOS+MACROS NFR PRT: 32 %
NEUTROPHILS PERITONEAL FLUID: 18 %
OSMOLALITY SERPL CALC.SUM OF ELEC: 279 MOSM/KG (ref 275–295)
POTASSIUM SERPL-SCNC: 3.4 MMOL/L (ref 3.5–5.1)
PROT PRT-MCNC: <2 G/DL
PROT SERPL-MCNC: 5.7 G/DL (ref 5.7–8.2)
RBC PERITONEAL FLUID: <3000 /MM3
SODIUM SERPL-SCNC: 134 MMOL/L (ref 136–145)
TOTAL CELLS COUNTED FLD: 100
TURBIDITY CSF QL: CLEAR
U1 SNRNP IGG SER IA-ACNC: 1.6 U/ML
WBC # PRT: 150 /MM3

## 2024-08-06 PROCEDURE — 0W9G30Z DRAINAGE OF PERITONEAL CAVITY WITH DRAINAGE DEVICE, PERCUTANEOUS APPROACH: ICD-10-PCS | Performed by: RADIOLOGY

## 2024-08-06 PROCEDURE — 99233 SBSQ HOSP IP/OBS HIGH 50: CPT | Performed by: INTERNAL MEDICINE

## 2024-08-06 PROCEDURE — 99232 SBSQ HOSP IP/OBS MODERATE 35: CPT | Performed by: HOSPITALIST

## 2024-08-06 PROCEDURE — 49083 ABD PARACENTESIS W/IMAGING: CPT | Performed by: STUDENT IN AN ORGANIZED HEALTH CARE EDUCATION/TRAINING PROGRAM

## 2024-08-06 RX ORDER — SPIRONOLACTONE 25 MG/1
50 TABLET ORAL DAILY
Status: DISCONTINUED | OUTPATIENT
Start: 2024-08-06 | End: 2024-08-07

## 2024-08-06 RX ORDER — FLUTICASONE PROPIONATE AND SALMETEROL 250; 50 UG/1; UG/1
1 POWDER RESPIRATORY (INHALATION) 2 TIMES DAILY
Status: DISCONTINUED | OUTPATIENT
Start: 2024-08-06 | End: 2024-08-07

## 2024-08-06 RX ORDER — FUROSEMIDE 20 MG/1
20 TABLET ORAL DAILY
Status: DISCONTINUED | OUTPATIENT
Start: 2024-08-06 | End: 2024-08-07

## 2024-08-06 RX ORDER — ONDANSETRON 2 MG/ML
4 INJECTION INTRAMUSCULAR; INTRAVENOUS ONCE AS NEEDED
Status: ACTIVE | OUTPATIENT
Start: 2024-08-06 | End: 2024-08-06

## 2024-08-06 RX ORDER — POTASSIUM CHLORIDE 1.5 G/1.58G
40 POWDER, FOR SOLUTION ORAL ONCE
Status: COMPLETED | OUTPATIENT
Start: 2024-08-06 | End: 2024-08-06

## 2024-08-06 RX ORDER — BENZONATATE 100 MG/1
100 CAPSULE ORAL 3 TIMES DAILY
Status: DISCONTINUED | OUTPATIENT
Start: 2024-08-06 | End: 2024-08-07

## 2024-08-06 RX ORDER — MAGNESIUM OXIDE 400 MG/1
800 TABLET ORAL ONCE
Status: COMPLETED | OUTPATIENT
Start: 2024-08-06 | End: 2024-08-06

## 2024-08-06 NOTE — PAYOR COMM NOTE
--------------  CONTINUED STAY REVIEW    Payor: [employer]  Subscriber #:  [id number]  Authorization Number: [id number]    Admit date: 8/3/24  Admit time:  6:12 PM    REVIEW DOCUMENTATION:   8-6-24    Procedure: US paracentesis  yielding approximately 2.3 L of serous fluid.      Pre-Procedure Diagnosis:  Ascites     Post-Procedure Diagnosis: Same     Anesthesia:  Local     Findings:  5f one step to RLQ     Physical Exam:    General: No acute distress, pleasant, thin  Respiratory: Dec to left base, rhonchi   Cardiovascular: S1, S2, regular rate and rhythm  Abdomen: Soft, Non-tender, non-distended, positive bowel sounds  Neuro: No new focal deficits.   Extremities: No edema      Assessment & Plan:  #Loculated left pleural effusion  Noted on CT  Left thoracentesis on 8.5  F/u fluid cytology   Pulm following      #Pneumonia  CT reviewed  CXR with dense LLL effusion   Afebrile, no leukocytosis, sats good on RA  LA 2.7  Unasyn -  5days, azithro - completed   Cultures - NGTD     #Cirrhotic features on CT  Denies alcohol use  INR elevated, Bili 6.3 -> 4.7 -> 3.5  GI following, workup underway, may need EGD     #Ascites  Paracentesis 8.6  F/u fluid studies, cytology      #LE swelling, SOB  Echo with normal EF    #CKD 3B  Cr close to baseline, monitor  Avoid nephrotoxic agents     #Hyponatremia  Na 134     #Macrocytic anemia  Hg 11 -> 10.4, check anemia profile      #medical debility/deconditioning  PT/OT     Component  Ref Range & Units 8/6/24 0712   Magnesium  1.6 - 2.6 mg/dL 1.5 Low      Component  Ref Range & Units 8/6/24 0712   Glucose  70 - 99 mg/dL 82   Sodium  136 - 145 mmol/L 134 Low    Potassium  3.5 - 5.1 mmol/L 3.4 Low    Chloride  98 - 112 mmol/L 106   CO2  21.0 - 32.0 mmol/L 20.0 Low    Anion Gap  0 - 18 mmol/L 8   BUN  9 - 23 mg/dL 17   Creatinine  0.55 - 1.02 mg/dL 1.37 High    Calcium, Total  8.7 - 10.4 mg/dL 9.0   Calculated Osmolality  275 - 295 mOsm/kg 279   eGFR-Cr  >=60 mL/min/1.73m2 38 Low     AST  <34 U/L 94 High    ALT  10 - 49 U/L 30   Alkaline Phosphatase  55 - 142 U/L 112   Bilirubin, Total  0.2 - 1.1 mg/dL 3.5 High    Total Protein  5.7 - 8.2 g/dL 5.7   Albumin  3.2 - 4.8 g/dL 2.3 Low    Globulin  2.0 - 3.5 g/dL 3.4   A/G Ratio  1.0 - 2.0 0.7 Low      Component  Ref Range & Units 8/6/24 1045   Body Fluid Color Yellow   Body Fluid Clarity Clear   WBC, Peritoneal Fluid  /mm3 150   RBC Peritoneal  /mm3 <3,000     Component  Ref Range & Units 8/6/24 1045   Neutrophils Peritoneal  % 18   Comment: No reference range established   Lymphocyte Peritoneal  % 46   Mono/Mac/Histio Peritoneal  % 32   Eosinophils Peritoneal  % 0   Basophil Peritoneal  % 0   Mesothelial Peritoneal  % 4   Total Cells Counted 100              MEDICATIONS ADMINISTERED IN LAST 1 DAY:  ampicillin-sulbactam (Unasyn) 3 g in sodium chloride 0.9% 100mL IVPB-ADD       Date Action Dose Route User    8/6/2024 1120 New Bag 3 g Intravenous Marisol Bates RN    8/5/2024 2010 New Bag 3 g Intravenous Amee Ireland RN          azithromycin (Zithromax) 500 mg in sodium chloride 0.9% 250mL IVPB premix       Date Action Dose Route User    8/5/2024 1602 New Bag 500 mg Intravenous Marisol Bates RN          guaiFENesin ER (Mucinex) 12 hr tab 600 mg       Date Action Dose Route User    8/5/2024 2020 Given 600 mg Oral Amee Ireland RN          magnesium oxide (Mag-Ox) tab 800 mg       Date Action Dose Route User    8/5/2024 1602 Given 800 mg Oral Marisol Bates RN          magnesium oxide (Mag-Ox) tab 800 mg       Date Action Dose Route User    8/6/2024 1120 Given 800 mg Oral Marisol Bates RN          potassium chloride (Klor-Con) 20 MEQ oral powder 40 mEq       Date Action Dose Route User    8/6/2024 1120 Given 40 mEq Oral Marisol Bates RN            Vitals (last day)       Date/Time Temp Pulse Resp BP SpO2 Weight O2 Device O2 Flow Rate (L/min) [facility]    08/06/24 1100 98.6 °F (37 °C) 87 16 111/51 -- -- Nasal cannula  1 L/min TJ    08/06/24 0812 -- -- -- -- -- -- Nasal cannula 1 L/min AM    08/06/24 0752 97.4 °F (36.3 °C) 97 19 113/66 91 % -- Nasal cannula 1 L/min TJ    08/06/24 0515 97.9 °F (36.6 °C) 85 16 112/56 93 % -- Nasal cannula 1 L/min ZS    08/06/24 0150 -- -- -- -- 94 % -- Nasal cannula 1 L/min     08/06/24 0135 -- -- -- -- 88 % -- None (Room air) -- ZS    08/06/24 0014 97.8 °F (36.6 °C) 88 18 106/59 90 % -- -- -- DC    08/05/24 2010 97.7 °F (36.5 °C) 88 18 106/65 94 % -- None (Room air) --     08/05/24 1751 -- 91 -- -- -- -- -- -- ERYN    08/05/24 0500 97 °F (36.1 °C) 80 -- 99/63 -- -- -- -- DC    08/05/24 0500 -- -- 14 -- 93 % -- Nasal cannula 2 L/min ZS

## 2024-08-06 NOTE — PHYSICAL THERAPY NOTE
PHYSICAL THERAPY EVALUATION - INPATIENT     Room Number: 506/506-A  Evaluation Date: 8/6/2024  Type of Evaluation: Initial  Physician Order: PT Eval and Treat    Presenting Problem: weakness  Co-Morbidities : HTN, CKD  Reason for Therapy: Mobility Dysfunction and Discharge Planning    PHYSICAL THERAPY ASSESSMENT   Patient is currently functioning below baseline with bed mobility, transfers, gait, stair negotiation, and standing prolonged periods.  Prior to admission, patient's baseline is Mod I with RW for ambulation, and has assist as needed for ADL.  Patient is requiring contact guard assist as a result of the following impairments: decreased functional strength, decreased endurance/aerobic capacity, impaired standing/gait balance, and decreased muscular endurance.  Physical Therapy will continue to follow for duration of hospitalization.    Patient will benefit from continued skilled PT Services at discharge to promote prior level of function and safety with additional support and return home with home health PT.    PLAN  PT Treatment Plan: Bed mobility;Body mechanics;Endurance;Energy conservation;Family education;Gait training;Stair training;Transfer training;Balance training  Rehab Potential : Good  Frequency (Obs): 3-5x/week  Number of Visits to Meet Established Goals: 5      CURRENT GOALS    Goal #1 Patient is able to demonstrate supine - sit EOB @ level: modified independent     Goal #2 Patient is able to demonstrate transfers Sit to/from Stand at assistance level: supervision     Goal #3 Patient is able to ambulate 150 feet with assist device: walker - rolling at assistance level: supervision     Goal #4 Pt to ascend/descend a flight of stairs with supervision and 1 rail   Goal #5    Goal #6    Goal Comments: Goals established on 8/6/2024      PHYSICAL THERAPY MEDICAL/SOCIAL HISTORY  History related to current admission: Patient is a 84 year old female admitted on 8/3/2024 from home for weakness and  dec appetite.  Pt diagnosed with pneumonia.      HOME SITUATION  Type of Home: House   Home Layout: Two level  Stairs to Enter : 2  Railing: Yes  Stairs to Bedroom: 12  Railing: Yes    Lives With: Spouse  Drives: No  Patient Owned Equipment: Rolling walker       Prior Level of [place]: Per pt report, pt uses a RW for ambulation, and has assistance from spouse with ADL as needed. Pt's spouse performs driving and IADL.    SUBJECTIVE  Pt is pleasant and cooperative.       OBJECTIVE  Precautions: Bed/chair alarm  Fall Risk: Standard fall risk    WEIGHT BEARING RESTRICTION                   PAIN ASSESSMENT  Ratin  Location: denies pain       COGNITION  Overall Cognitive Status:  WFL - within functional limits    RANGE OF MOTION AND STRENGTH ASSESSMENT  Upper extremity ROM and strength are within functional limits     Lower extremity ROM is within functional limits     Lower extremity strength is within functional limits       BALANCE  Static Sitting: Good  Dynamic Sitting: Fair +  Static Standing: Poor +  Dynamic Standing: Poor +    ADDITIONAL TESTS  Additional Tests: Elderly Mobility Scale     Elderly Mobility Scale: 18/20                           ACTIVITY TOLERANCE  Pulse: 85  Heart Rate Source: Monitor                   O2 WALK       NEUROLOGICAL FINDINGS                        AM-PAC '6-Clicks' INPATIENT SHORT FORM - BASIC MOBILITY  How much difficulty does the patient currently have...  Patient Difficulty: Turning over in bed (including adjusting bedclothes, sheets and blankets)?: None   Patient Difficulty: Sitting down on and standing up from a chair with arms (e.g., wheelchair, bedside commode, etc.): None   Patient Difficulty: Moving from lying on back to sitting on the side of the bed?: None   How much help from another person does the patient currently need...   Help from Another: Moving to and from a bed to a chair (including a wheelchair)?: A Little   Help from Another: Need to walk in hospital  room?: A Little   Help from Another: Climbing 3-5 steps with a railing?: A Little       AM-PAC Score:  Raw Score: 21   Approx Degree of Impairment: 28.97%   Standardized Score (AM-PAC Scale): 50.25   CMS Modifier (G-Code): CJ    FUNCTIONAL ABILITY STATUS  Gait Assessment   Functional Mobility/Gait Assessment  Gait Assistance: Contact guard assist  Distance (ft): 100  Assistive Device: Rolling walker  Pattern: Shuffle    Skilled Therapy Provided     Bed Mobility:  Rolling: NT  Supine to sit: supervision   Sit to supine: NT     Transfer Mobility:  Sit to stand: CGA   Stand to sit: CGA  Gait = CGA with RW    Therapist's Comments:  PT orders received, chart reviewed, and RN approved PT session. Pt lying in bed and agreeable to PT. Vitals assessed and WNL. Pt instructed in proper hand placement and integration of RW with transfers and ambulation. Pt cued for upright posture in standing and during ambulation and to keep RW within JAYDE. Pt cued for upright posture throughout standing. Pt with inc fatigue end of session, left up in chair, alarm on. RN notified.    Increased time spent on education and discussion with pt regarding the importance of safety awareness, fall precautions, activity level and pacing. Recommend to amb with RN staff at least 3 times per day and to be up in chair 3 times per day.     Exercise/Education Provided:  Body mechanics  Energy conservation  Functional activity tolerated  Gait training  Transfer training    Patient End of Session: Up in chair;Needs met;Call light within reach;RN aware of session/findings;All patient questions and concerns addressed;Alarm set;Family present      Patient Evaluation Complexity Level:  History Moderate - 1 or 2 personal factors and/or co-morbidities   Examination of body systems Low - addressing 1-2 elements   Clinical Presentation Low - Stable   Clinical Decision Making Low - Stable       PT Session Time: 30 minutes  Gait Training: 15 minutes  Therapeutic  Activity: 0 minutes  Neuromuscular Re-education: 0 minutes  Therapeutic Exercise: 0 minutes

## 2024-08-06 NOTE — PROCEDURES
[facility]   part of [facility]  Procedure Note    Gayla Howe Patient Status:  Inpatient    1940 MRN [id number]   Location [facility] 5NW-A Attending Emmanuel Bennett MD   Hosp Day # 3 PCP Malgorzata Villalta MD     Procedure: US paracentesis    Pre-Procedure Diagnosis:  Ascites    Post-Procedure Diagnosis: Same    Anesthesia:  Local    Findings:  5f one step to RLQ    Specimens: See report    Blood Loss:  Minimal    Tourniquet Time: None  Complications:  None  Drains:  None    Secondary Diagnosis:  None    Zac Hall MD  2024

## 2024-08-06 NOTE — PROGRESS NOTES
[place] [facility] Progress Note     SUBJECTIVE/Interval history:  No acute events overnight, she feels better today, had paracentesis earlier. Cough stable. No dyspnea.  Weaned to RA    Review of Systems:   A comprehensive 14 point review of systems was completed.   Pertinent positives and negatives noted in the HPI.    Medications  Reviewed personally   multivitamin with minerals  1 tablet Oral Daily    ampicillin-sulbactam  3 g Intravenous q12h    pantoprazole  40 mg Intravenous Daily    [Held by provider] enoxaparin  40 mg Subcutaneous Daily    guaiFENesin ER  600 mg Oral BID       metoclopramide    acetaminophen    melatonin    glycerin-hypromellose-    sodium chloride    polyethylene glycol (PEG 3350)    sennosides    bisacodyl    fleet enema    ondansetron    benzonatate    OBJECTIVE:  Vitals:    08/06/24 0135 08/06/24 0150 08/06/24 0515 08/06/24 0752   BP:   112/56 113/66   BP Location:   Left arm Left arm   Pulse:   85 97   Resp:   16 19   Temp:   97.9 °F (36.6 °C) 97.4 °F (36.3 °C)   TempSrc:   Oral Oral   SpO2: (!) 88% 94% 93% 91%   Weight:       Height:           Vital signs in last 24 hours:  Blood pressure 113/66, pulse 97, temperature 97.4 °F (36.3 °C), temperature source Oral, resp. rate 19, height 5' 2\" (1.575 m), weight 135 lb (61.2 kg), SpO2 91%.     Intake/Output:  I/O last 3 completed shifts:  In: -   Out: 550 [Urine:200; Drains:350]       Physical Exam:  General: Appears alert, comfortable. No acute distress  Neurologic:No focal deficits.  Alert.  Oriented.  Lungs:rare rhonchi. No wheeze. No distress  Heart:RRR no m  Abdomen:Soft, non-tender.  No masses.  No guarding.  No rebound.  Extremities:no edema    Lab Data Review:   Recent Labs   Lab 08/04/24  1048 08/05/24  0838 08/06/24  0712   GLU 87 77 82   BUN 18 15 17   CREATSERUM 1.41* 1.35* 1.37*   CA 9.3 9.2 9.0   * 134* 134*   K 3.2* 3.6  3.6 3.4*    105 106   CO2 19.0* 19.0* 20.0*     Recent Labs    Lab 08/03/24  1313 08/04/24  1048 08/05/24  0838   RBC 3.00* 2.88* 2.75*   HGB 11.0* 10.4* 9.9*   HCT 33.1* 30.9* 29.7*   .3* 107.3* 108.0*   MCH 36.7* 36.1* 36.0*   MCHC 33.2 33.7 33.3   RDW 16.2 16.2 16.2   NEPRELIM 6.66 5.92  --    WBC 9.2 8.9 6.5   .0 195.0 197.0     No results for input(s): \"BNP\" in the last 168 hours.  No results for input(s): \"TROP\", \"CK\" in the last 168 hours.  Recent Labs   Lab 08/03/24  1438 08/05/24  0838   INR 1.68* 1.64*     No results for input(s): \"ABGPHT\", \"DPCUEK0K\", \"NXEFA1N\", \"ABGHCO3\", \"ABGBE\", \"TEMP\", \"PETER\", \"SITE\", \"DEV\", \"THGB\" in the last 168 hours.    Invalid input(s): \"AQD49BPF\", \"CHOB\"    Other Labs:  Interval Culture Data:   Hospital Encounter on 08/03/24   1. Blood Culture     Status: None (Preliminary result)    Collection Time: 08/03/24  4:32 PM    Specimen: Blood,peripheral   Result Value Ref Range    Blood Culture Result No Growth 2 Days N/A     No results for input(s): \"COLORUR\", \"CLARITY\", \"SPECGRAVITY\", \"GLUUR\", \"BILUR\", \"KETUR\", \"BLOODURINE\", \"PHURINE\", \"PROUR\", \"UROBILINOGEN\", \"NITRITE\", \"LEUUR\", \"WBCUR\", \"RBCUR\", \"BACUR\", \"EPIUR\" in the last 168 hours.    Interval Radiology:   Reviewed personally  US THORACENTESIS GUIDED LEFT (CPT=32555)    Result Date: 8/5/2024  CONCLUSION: Successful left sided thoracentesis without complication   LOCATION:  [facility]    Dictated by (CST): Anaid Vee MD on 8/05/2024 at 4:02 PM     Finalized by (CST): Anaid Vee MD on 8/05/2024 at 4:03 PM       US ABDOMEN DOPPLER ONLY (CPT=93975)    Result Date: 8/5/2024  CONCLUSION:  1. Cirrhotic morphology of the liver. 2. Nonspecific moderate volume ascites. 3. No flow detected in the portal vein segments.  This may be related to slow or balanced flow, as patency was demonstrated on the CT scan from 2 days ago. 4. Bowel gas obscuration of the pancreas.    LOCATION:  [facility]    Dictated by (CST): Wilberto Farrell MD on 8/05/2024 at 2:42 PM     Finalized by (CST): Bud  MD Wilberto on 8/05/2024 at 2:48 PM       XR CHEST AP PORTABLE  (CPT=71045)    Result Date: 8/5/2024  CONCLUSION:  No pneumothorax after left-sided thoracentesis.   LOCATION:  [facility]      Dictated by (CST): Wilberto Farrell MD on 8/05/2024 at 2:42 PM     Finalized by (CST): Wilberto Farrell MD on 8/05/2024 at 2:42 PM       XR CHEST AP PORTABLE  (CPT=71045)    Result Date: 8/5/2024  CONCLUSION:  No significant change.  Persistent left pleural effusion and left lower lobe pneumonia/atelectasis.   LOCATION:  [facility]      Dictated by (CST): David Leavitt MD on 8/05/2024 at 11:52 AM     Finalized by (CST): David Leavitt MD on 8/05/2024 at 11:53 AM       CT ABDOMEN+PELVIS(CONTRAST ONLY)(CPT=74177)    Result Date: 8/3/2024  CONCLUSION:   1. Partially imaged possibly loculated left pleural effusion with atelectasis/consolidation in the left lower lobe.  Underlying pneumonia is not excluded.  Clinical correlation recommended.   2. Cirrhotic changes in the liver with moderate ascites in the abdomen and pelvis.  Mild mesenteric edema.  3. Uncomplicated colonic diverticulosis.  4. Large hiatal hernia.   Please see above for further details.  LOCATION:  [id number]   Dictated by (CST): Barney Gillette MD on 8/03/2024 at 3:31 PM     Finalized by (CST): Barney Gillette MD on 8/03/2024 at 3:35 PM       XR CHEST AP PORTABLE  (CPT=71045)    Result Date: 8/3/2024  CONCLUSION:  Dense consolidation in the left lower lobe with associated small left pleural effusion is concerning for pneumonia.  Clinical correlation recommended along with follow-up to confirm resolution.    LOCATION:  [id number]      Dictated by (CST): Barney Gillette MD on 8/03/2024 at 2:16 PM     Finalized by (CST): Barney Gillette MD on 8/03/2024 at 2:17 PM        Assessment:  Acute hypoxic respiratory failure due to pneumonia, pleural effusion  LLL consolidation/pneumonia, community acquired  Left pleural effusion, s/p thoracentesis with analysis consistent with transudate  Cirrhosis of  liver on CT abdomen   Transaminitis: Mildly elevated AST on labs  Hypertension  Mild to moderate mixed plaque in the aorta and iliac arteries  Anemia  Hyperbilirubinemia/jaundice  Hyponatremia     Plan:  Monitor respiratory status, goal sats >89%  Continue empiric abx -unasyn, azithro; await cx results  Start ICS/LABA to aid cough  Benzonatate PRN  She will require outpatient f/u CT chest in 2-3months    Sacha Alonso MD

## 2024-08-06 NOTE — PLAN OF CARE
Received pt A&Ox4, forgetful. Intermittent oxygen monitoring as pt removes pulse ox. Maintaining O2 on RA during the day, placed on 1L overnight with sleep. NSR on tele. Lovenox for VTE prophylaxis - held in MAR for procedures. Tolerating diet, NPO at midnight. Voids via purwick. Up with assistance and walker. PT has yet to work with patient. Denies Pain. Receiving IV abx.  at bedside. Plan of care continues, no further needs at this time.     Problem: Patient/Family Goals  Goal: Patient/Family Long Term Goal  Description: Patient's Long Term Goal:     Interventions:  - See additional Care Plan goals for specific interventions  Outcome: Progressing  Goal: Patient/Family Short Term Goal  Description: Patient's Short Term Goal:  8/3 NOC: sleep  8/4 NOC: sleep, have IVF stopped for night  8/5 AM: get all procedures done today  8/5 NOC: paracentesis in AM  Interventions:   - cluster care  - See additional Care Plan goals for specific interventions  Outcome: Progressing     Problem: CARDIOVASCULAR - ADULT  Goal: Maintains optimal cardiac output and hemodynamic stability  Description: INTERVENTIONS:  - Monitor vital signs, rhythm, and trends  - Monitor for bleeding, hypotension and signs of decreased cardiac output  - Evaluate effectiveness of vasoactive medications to optimize hemodynamic stability  - Monitor arterial and/or venous puncture sites for bleeding and/or hematoma  - Assess quality of pulses, skin color and temperature  - Assess for signs of decreased coronary artery perfusion - ex. Angina  - Evaluate fluid balance, assess for edema, trend weights  Outcome: Progressing     Problem: RESPIRATORY - ADULT  Goal: Achieves optimal ventilation and oxygenation  Description: INTERVENTIONS:  - Assess for changes in respiratory status  - Assess for changes in mentation and behavior  - Position to facilitate oxygenation and minimize respiratory effort  - Oxygen supplementation based on oxygen saturation or ABGs  -  Provide Smoking Cessation handout, if applicable  - Encourage broncho-pulmonary hygiene including cough, deep breathe, Incentive Spirometry  - Assess the need for suctioning and perform as needed  - Assess and instruct to report SOB or any respiratory difficulty  - Respiratory Therapy support as indicated  - Manage/alleviate anxiety  - Monitor for signs/symptoms of CO2 retention  Outcome: Progressing     Problem: GASTROINTESTINAL - ADULT  Goal: Maintains adequate nutritional intake (undernourished)  Description: INTERVENTIONS:  - Monitor percentage of each meal consumed  - Identify factors contributing to decreased intake, treat as appropriate  - Assist with meals as needed  - Monitor I&O, WT and lab values  - Obtain nutritional consult as needed  - Optimize oral hygiene and moisture  - Encourage food from home; allow for food preferences  - Enhance eating environment  Outcome: Progressing     Problem: SKIN/TISSUE INTEGRITY - ADULT  Goal: Skin integrity remains intact  Description: INTERVENTIONS  - Assess and document risk factors for pressure ulcer development  - Assess and document skin integrity  - Monitor for areas of redness and/or skin breakdown  - Initiate interventions, skin care algorithm/standards of care as needed  Outcome: Progressing  Goal: Incision(s), wounds(s) or drain site(s) healing without S/S of infection  Description: INTERVENTIONS:  - Assess and document risk factors for pressure ulcer development  - Assess and document skin integrity  - Assess and document dressing/incision, wound bed, drain sites and surrounding tissue  - Implement wound care per orders  - Initiate isolation precautions as appropriate  - Initiate Pressure Ulcer prevention bundle as indicated  Outcome: Progressing

## 2024-08-06 NOTE — PROGRESS NOTES
[facility]   part of [facility]     Hospitalist Progress Note     Gayla Howe Patient Status:  Inpatient    1940 MRN [id number]   Location [facility] 5NW-A Attending Augusto Easley MD   Hosp Day # 3 PCP Malgorzata Villalta MD     Chief Complaint: weakness, dec appetite     Subjective:     Patient feels a little better this morning, seen after her paracentesis.  Has not yet had breakfast.  Denies cp, sob.  No other acute complaints.      Objective:    Review of Systems:   A comprehensive review of systems was completed; pertinent positive and negatives stated in subjective.    Vital signs:  Temp:  [97.2 °F (36.2 °C)-97.9 °F (36.6 °C)] 97.9 °F (36.6 °C)  Pulse:  [79-91] 85  Resp:  [16-20] 16  BP: ()/(52-66) 112/56  SpO2:  [88 %-98 %] 93 %    Physical Exam:    General: No acute distress, pleasant, thin  Respiratory: Dec to left base, rhonchi   Cardiovascular: S1, S2, regular rate and rhythm  Abdomen: Soft, Non-tender, non-distended, positive bowel sounds  Neuro: No new focal deficits.   Extremities: No edema    Diagnostic Data:    Labs:  Recent Labs   Lab 24  1313 24  1438 24  1048 24  0838   WBC 9.2  --  8.9 6.5   HGB 11.0*  --  10.4* 9.9*   .3*  --  107.3* 108.0*   .0  --  195.0 197.0   INR  --  1.68*  --  1.64*       Recent Labs   Lab 24  1313 24  1048 24  0838   GLU 90 87 77   BUN 20 18 15   CREATSERUM 1.57* 1.41* 1.35*   CA 9.6 9.3 9.2   ALB 2.9* 2.6* 2.5*   * 132* 134*   K 5.1 3.2* 3.6  3.6    102 105   CO2 18.0* 19.0* 19.0*   ALKPHO 131 128 126   * 101* 101*   ALT 36 33 34   BILT 6.3* 4.7* 3.8*   TP 6.8 6.3 6.2       Estimated Creatinine Clearance: 25 mL/min (A) (based on SCr of 1.35 mg/dL (H)).    No results for input(s): \"TROP\", \"TROPHS\", \"CK\" in the last 168 hours.    Recent Labs   Lab 24  1438 24  0838   PTP 19.9* 19.6*   INR 1.68* 1.64*                  Microbiology    Hospital Encounter on  08/03/24   1. Blood Culture     Status: None (Preliminary result)    Collection Time: 08/03/24  4:32 PM    Specimen: Blood,peripheral   Result Value Ref Range    Blood Culture Result No Growth 2 Days N/A         Imaging: Reviewed in Epic.    Medications:    multivitamin with minerals  1 tablet Oral Daily    ampicillin-sulbactam  3 g Intravenous q12h    pantoprazole  40 mg Intravenous Daily    [Held by provider] enoxaparin  40 mg Subcutaneous Daily    guaiFENesin ER  600 mg Oral BID       Assessment & Plan:      #Loculated left pleural effusion  Noted on CT  Left thoracentesis on 8.5  F/u fluid cytology   Pulm following     #Pneumonia  CT reviewed  CXR with dense LLL effusion   Afebrile, no leukocytosis, sats good on RA  LA 2.7  Unasyn -  5days, azithro - completed   Cultures - NGTD     #Cirrhotic features on CT  Denies alcohol use  INR elevated, Bili 6.3 -> 4.7 -> 3.5  GI following, workup underway, may need EGD    #Ascites  Paracentesis 8.6  F/u fluid studies, cytology      #LE swelling, SOB  Echo with normal EF    #CKD 3B  Cr close to baseline, monitor  Avoid nephrotoxic agents     #Hyponatremia  Na 134     #Macrocytic anemia  Hg 11 -> 10.4, check anemia profile     #medical debility/deconditioning  PT/OT         Emmanuel Bennett MD    Supplementary Documentation:     Quality:  DVT Mechanical Prophylaxis:     Early ambuation  DVT Pharmacologic Prophylaxis   Medication    [Held by provider] enoxaparin (Lovenox) 40 MG/0.4ML SUBQ injection 40 mg                Code Status: Full Code  Street: External urinary catheter in place  Street Duration (in days):   Central line:    HEIKE:     Discharge is dependent on: clinical recovery   At this point Ms. Howe is expected to be discharge to: Home    The 21st Century Cures Act makes medical notes like these available to patients in the interest of transparency. Please be advised this is a medical document. Medical documents are intended to carry relevant information, facts as  evident, and the clinical opinion of the practitioner. The medical note is intended as peer to peer communication and may appear blunt or direct. It is written in medical language and may contain abbreviations or verbiage that are unfamiliar.             **Certification      PHYSICIAN Certification of Need for Inpatient Hospitalization - Initial Certification    Patient will require inpatient services that will reasonably be expected to span two midnight's based on the clinical documentation in H+P.   Based on patients current state of illness, I anticipate that, after discharge, patient will require TBD.

## 2024-08-06 NOTE — PROGRESS NOTES
Inpatient Follow up Note    Gayla Howe Patient Status:  Inpatient    1940 MRN [id number]   Location [facility] 5NW-A Attending Emmanuel Bennett MD   Hosp Day # 3 PCP Malgorzata Villalta MD     Reason for Consultation   Cirrhosis, ascites     Subjective   Per , patient has been drinking more heavily over the last few years, at least a few drinks of whiskey per night. She denies abdominal pain, confusion, GI bleeding. Had paracentesis today, ~2L out.              Objective:     /51 (BP Location: Left arm)   Pulse 87   Temp 98.6 °F (37 °C) (Oral)   Resp 16   Ht 5' 2\" (1.575 m)   Wt 135 lb (61.2 kg)   SpO2 91%   BMI 24.69 kg/m²   Gen: AAOx3, no asterixis  CV: RRR with normal S1 / S2  Resp: CTA bilaterally  Abd: (+)BS, soft, non-tender, moderately distended; no rebound or guarding  Ext: 1+ edema, no cyanosis  Skin: Warm and dry     Labs/Imaging     LABRCNTIP[PGLU:5@  Recent Labs   Lab 24  1438 24  0838   INR 1.68* 1.64*         Recent Labs   Lab 24  1313 24  1048 24  0838   WBC 9.2 8.9 6.5   HGB 11.0* 10.4* 9.9*   .0 195.0 197.0       Recent Labs   Lab 24  1313 24  1048 24  0838 24  0712   * 132* 134* 134*   K 5.1 3.2* 3.6  3.6 3.4*    102 105 106   CO2 18.0* 19.0* 19.0* 20.0*   BUN 20 18 15 17   CREATSERUM 1.57* 1.41* 1.35* 1.37*       Recent Labs   Lab 24  1313 24  1048 24  0838 24  0712   ALT 36 33 34 30   * 101* 101* 94*     US PARACENTESIS W IMAGING (CPT=49083)    Result Date: 2024  CONCLUSION:  Ultrasound-guided paracentesis was performed without complication.   LOCATION:  [facility]     Dictated by (CST): Zac Hall MD on 2024 at 11:04 AM     Finalized by (CST): Zac Hall MD on 2024 at 11:05 AM       US THORACENTESIS GUIDED LEFT (CPT=32555)    Result Date: 2024  CONCLUSION: Successful left sided thoracentesis without complication   LOCATION:  [facility]     Dictated by (CST): Anaid Vee MD on 8/05/2024 at 4:02 PM     Finalized by (CST): Anaid Vee MD on 8/05/2024 at 4:03 PM       US ABDOMEN DOPPLER ONLY (CPT=93975)    Result Date: 8/5/2024  CONCLUSION:  1. Cirrhotic morphology of the liver. 2. Nonspecific moderate volume ascites. 3. No flow detected in the portal vein segments.  This may be related to slow or balanced flow, as patency was demonstrated on the CT scan from 2 days ago. 4. Bowel gas obscuration of the pancreas.    LOCATION:  [facility]    Dictated by (CST): Wilberto Farrell MD on 8/05/2024 at 2:42 PM     Finalized by (CST): Wilberto Farrell MD on 8/05/2024 at 2:48 PM       XR CHEST AP PORTABLE  (CPT=71045)    Result Date: 8/5/2024  CONCLUSION:  No pneumothorax after left-sided thoracentesis.   LOCATION:  [facility]      Dictated by (CST): Wilberto Farrell MD on 8/05/2024 at 2:42 PM     Finalized by (CST): Wilberto Farrell MD on 8/05/2024 at 2:42 PM       XR CHEST AP PORTABLE  (CPT=71045)    Result Date: 8/5/2024  CONCLUSION:  No significant change.  Persistent left pleural effusion and left lower lobe pneumonia/atelectasis.   LOCATION:  [facility]      Dictated by (CST): David Leavitt MD on 8/05/2024 at 11:52 AM     Finalized by (CST): David Leavitt MD on 8/05/2024 at 11:53 AM      AST   Date/Time Value Ref Range Status   08/06/2024 07:12 AM 94 (H) <34 U/L Final     ALT   Date/Time Value Ref Range Status   08/06/2024 07:12 AM 30 10 - 49 U/L Final     BUN   Date/Time Value Ref Range Status   08/06/2024 07:12 AM 17 9 - 23 mg/dL Final              Assessment   Gayla Howe is a 84 year old female with history of hypertension that presented to the ER with decreased appetite and also found to have elevated liver enzymes, cirrhosis, and ascites. The etiology of her cirrhosis is not known currently, but may be due to alcohol given her reported recent increase in alcohol use per her . She has portal HTN manifested with ascites, and had a paracentesis today with an elevated  SAAG and TP <2.5; it was negative for SBP.       Plan   -low sodium diet  -start lasix 20mg every day, spironolactone 50mg every day   -appreciate pulm reccs for pleural effusion, on Abx for PNA  -f/u chronic liver disease workup  -daily pantoprazole  -repeat CBC, CMP, INR 8/7am  -EGD vs fibroscan and platelet monitoring for variceal screening as outpatient due to current PNA/pleural effusion  -HCC screening in 2/2025       Mina Correa MD  4:04 PM  8/6/2024  [facility]  [phone]

## 2024-08-06 NOTE — PLAN OF CARE
Pt AOx4. VSS on RA. Tele, SR. ENashp. voids. Vte on hold for procedures. Denies pain. Up x 1 w walker. Voids. briefed. Saline locked. Iv abx. Diuretics added. Cardiac diet.  Pt and family updated on poc, no further needs at this time.         Problem: Patient/Family Goals  Goal: Patient/Family Long Term Goal  Description: Patient's Long Term Goal: be able to go without the purewick this admission    Interventions:  - poc  - See additional Care Plan goals for specific interventions  Outcome: Progressing  Goal: Patient/Family Short Term Goal  Description: Patient's Short Term Goal:  8/3 NOC: sleep  8/4 NOC: sleep, have IVF stopped for night  8/5 AM: get all procedures done today  8/5 NOC: paracentesis in AM  8/6 AM: see physical therapy today  Interventions:   - cluster care  - See additional Care Plan goals for specific interventions  Outcome: Progressing     Problem: CARDIOVASCULAR - ADULT  Goal: Maintains optimal cardiac output and hemodynamic stability  Description: INTERVENTIONS:  - Monitor vital signs, rhythm, and trends  - Monitor for bleeding, hypotension and signs of decreased cardiac output  - Evaluate effectiveness of vasoactive medications to optimize hemodynamic stability  - Monitor arterial and/or venous puncture sites for bleeding and/or hematoma  - Assess quality of pulses, skin color and temperature  - Assess for signs of decreased coronary artery perfusion - ex. Angina  - Evaluate fluid balance, assess for edema, trend weights  Outcome: Progressing     Problem: RESPIRATORY - ADULT  Goal: Achieves optimal ventilation and oxygenation  Description: INTERVENTIONS:  - Assess for changes in respiratory status  - Assess for changes in mentation and behavior  - Position to facilitate oxygenation and minimize respiratory effort  - Oxygen supplementation based on oxygen saturation or ABGs  - Provide Smoking Cessation handout, if applicable  - Encourage broncho-pulmonary hygiene including cough, deep breathe,  Incentive Spirometry  - Assess the need for suctioning and perform as needed  - Assess and instruct to report SOB or any respiratory difficulty  - Respiratory Therapy support as indicated  - Manage/alleviate anxiety  - Monitor for signs/symptoms of CO2 retention  Outcome: Progressing

## 2024-08-06 NOTE — OCCUPATIONAL THERAPY NOTE
OCCUPATIONAL THERAPY EVALUATION - INPATIENT    Room Number: 506/506-A  Evaluation Date: 8/6/2024     Type of Evaluation: Initial  Presenting Problem: Community acquired pneumonia of L lower lobe of lung    Physician Order: IP Consult to Occupational Therapy  Reason for Therapy:  ADL/IADL Dysfunction and Discharge Planning      OCCUPATIONAL THERAPY ASSESSMENT   Patient is a 84 year old female admitted on 8/3/2024 with Presenting Problem: Community acquired pneumonia of L lower lobe of lung. Co-Morbidities : HTN, CKD  Patient is currently functioning near baseline with ADLs and functional mobility.  Prior to admission, patient's baseline is at home with spouse assist as needed for ADLs/IADLs. Patient met all OT goals at or close to baseline level. Pt lives at home with supportive spouse who reports being willing and able to assist patient at current required level of assist. Patient reports no further questions/concerns at this time.     Patient will benefit from continued skilled OT Services at discharge to promote prior level of function.  Anticipate patient will return home with home health OT      WEIGHT BEARING RESTRICTION  Weight Bearing Restriction: None                Recommendations for nursing staff:   Transfers: 1 with RW  Toileting location: Toilet    EVALUATION SESSION:  Patient at start of session: semi-supine in bed  FUNCTIONAL TRANSFER ASSESSMENT  Sit to Stand: Edge of Bed  Edge of Bed: Supervision    BED MOBILITY  Supine to Sit : Supervision  Sit to Supine (OT): Not Tested  Scooting: Supervision    BALANCE ASSESSMENT  Static Sitting: Supervision  Sitting Unilateral: Supervision  Static Standing: Supervision  Standing Unilateral: Supervision    FUNCTIONAL ADL ASSESSMENT  LB Dressing Seated: Supervision      ACTIVITY TOLERANCE: WFL                         O2 SATURATIONS       COGNITION  Overall Cognitive Status:  WFL - within functional limits  COGNITION ASSESSMENTS       Upper Extremity:   ROM: within  functional limits   Strength: is within functional limits   Coordination:  Gross motor: WFL  Fine motor: WFL  Sensation: Light touch:  intact    EDUCATION PROVIDED  Patient: Role of Occupational Therapy; Plan of Care; Functional Transfer Techniques; Posture/Positioning; Compensatory ADL Techniques  Patient's Response to Education: Verbalized Understanding    Equipment used: RW  Demonstrates functional use    Therapist comments: Pt is pleasant and cooperative throughout session.     Patient End of Session: Up in chair;Needs met;Call light within reach;RN aware of session/findings;All patient questions and concerns addressed;Family present    OCCUPATIONAL PROFILE    HOME SITUATION  Type of Home: House  Home Layout: Two level  Lives With: Spouse                     Drives: No       Prior Level of Function: Pt is supervision/assist PRN with BADLs, IADLs. RW for mobility    SUBJECTIVE  \"He does all the cooking.\"    PAIN ASSESSMENT  Ratin  Location: denies       OBJECTIVE  Precautions: Bed/chair alarm  Fall Risk: Standard fall risk    WEIGHT BEARING RESTRICTION  Weight Bearing Restriction: None                AM-PAC ‘6-Clicks’ Inpatient Daily Activity Short Form  -   Putting on and taking off regular lower body clothing?: A Little  -   Bathing (including washing, rinsing, drying)?: A Little  -   Toileting, which includes using toilet, bedpan or urinal? : A Little  -   Putting on and taking off regular upper body clothing?: A Little  -   Taking care of personal grooming such as brushing teeth?: None  -   Eating meals?: None    AM-PAC Score:  Score: 20  Approx Degree of Impairment: 38.32%  Standardized Score (AM-PAC Scale): 42.03      ADDITIONAL TESTS     NEUROLOGICAL FINDINGS        PLAN   Patient has been evaluated and presents with no skilled Occupational Therapy needs at this time.  Patient discharged from Occupational Therapy services.  Please re-order if a new functional limitation presents during this  admission.      Patient Evaluation Complexity Level:   Occupational Profile/Medical History LOW - Brief history including review of medical or therapy records    Specific performance deficits impacting engagement in ADL/IADL LOW  1 - 3 performance deficits    Client Assessment/Performance Deficits MODERATE - Comorbidities and min to mod modifications of tasks    Clinical Decision Making LOW - Analysis of occupational profile, problem-focused assessments, limited treatment options    Overall Complexity LOW     OT Session Time: 17 minutes  Self-Care Home Management: 5 minutes  Therapeutic Activity: 10 minutes

## 2024-08-07 VITALS
SYSTOLIC BLOOD PRESSURE: 98 MMHG | BODY MASS INDEX: 24.84 KG/M2 | RESPIRATION RATE: 16 BRPM | HEART RATE: 80 BPM | HEIGHT: 62 IN | OXYGEN SATURATION: 95 % | TEMPERATURE: 98 F | WEIGHT: 135 LBS | DIASTOLIC BLOOD PRESSURE: 53 MMHG

## 2024-08-07 LAB
ALBUMIN SERPL-MCNC: 2.3 G/DL (ref 3.2–4.8)
ALBUMIN/GLOB SERPL: 0.6 {RATIO} (ref 1–2)
ALP LIVER SERPL-CCNC: 119 U/L
ALT SERPL-CCNC: 31 U/L
ANION GAP SERPL CALC-SCNC: 6 MMOL/L (ref 0–18)
AST SERPL-CCNC: 91 U/L (ref ?–34)
BILIRUB SERPL-MCNC: 3.6 MG/DL (ref 0.2–1.1)
BUN BLD-MCNC: 16 MG/DL (ref 9–23)
CALCIUM BLD-MCNC: 9.1 MG/DL (ref 8.7–10.4)
CHLORIDE SERPL-SCNC: 104 MMOL/L (ref 98–112)
CO2 SERPL-SCNC: 22 MMOL/L (ref 21–32)
CREAT BLD-MCNC: 1.37 MG/DL
EGFRCR SERPLBLD CKD-EPI 2021: 38 ML/MIN/1.73M2 (ref 60–?)
ERYTHROCYTE [DISTWIDTH] IN BLOOD BY AUTOMATED COUNT: 15.6 %
GLOBULIN PLAS-MCNC: 3.6 G/DL (ref 2–3.5)
GLUCOSE BLD-MCNC: 101 MG/DL (ref 70–99)
HCT VFR BLD AUTO: 28.1 %
HGB BLD-MCNC: 10 G/DL
INR BLD: 1.67 (ref 0.8–1.2)
MAGNESIUM SERPL-MCNC: 1.4 MG/DL (ref 1.6–2.6)
MCH RBC QN AUTO: 36.5 PG (ref 26–34)
MCHC RBC AUTO-ENTMCNC: 35.6 G/DL (ref 31–37)
MCV RBC AUTO: 102.6 FL
NON GYNE INTERPRETATION: NEGATIVE
OSMOLALITY SERPL CALC.SUM OF ELEC: 275 MOSM/KG (ref 275–295)
PLATELET # BLD AUTO: 153 10(3)UL (ref 150–450)
POTASSIUM SERPL-SCNC: 3.7 MMOL/L (ref 3.5–5.1)
POTASSIUM SERPL-SCNC: 3.7 MMOL/L (ref 3.5–5.1)
PROT SERPL-MCNC: 5.9 G/DL (ref 5.7–8.2)
PROTHROMBIN TIME: 19.8 SECONDS (ref 11.6–14.8)
RBC # BLD AUTO: 2.74 X10(6)UL
SODIUM SERPL-SCNC: 132 MMOL/L (ref 136–145)
WBC # BLD AUTO: 6 X10(3) UL (ref 4–11)

## 2024-08-07 PROCEDURE — 99239 HOSP IP/OBS DSCHRG MGMT >30: CPT | Performed by: INTERNAL MEDICINE

## 2024-08-07 PROCEDURE — 99232 SBSQ HOSP IP/OBS MODERATE 35: CPT | Performed by: INTERNAL MEDICINE

## 2024-08-07 RX ORDER — BENZONATATE 100 MG/1
100 CAPSULE ORAL 3 TIMES DAILY PRN
Qty: 30 CAPSULE | Refills: 1 | Status: SHIPPED | OUTPATIENT
Start: 2024-08-07

## 2024-08-07 RX ORDER — FLUTICASONE PROPIONATE AND SALMETEROL 250; 50 UG/1; UG/1
1 POWDER RESPIRATORY (INHALATION) 2 TIMES DAILY
Qty: 1 EACH | Refills: 0 | Status: SHIPPED | OUTPATIENT
Start: 2024-08-07

## 2024-08-07 RX ORDER — AMOXICILLIN AND CLAVULANATE POTASSIUM 875; 125 MG/1; MG/1
1 TABLET, FILM COATED ORAL 2 TIMES DAILY
Qty: 2 TABLET | Refills: 0 | Status: SHIPPED | OUTPATIENT
Start: 2024-08-07 | End: 2024-08-08

## 2024-08-07 RX ORDER — MAGNESIUM SULFATE HEPTAHYDRATE 40 MG/ML
2 INJECTION, SOLUTION INTRAVENOUS ONCE
Status: DISCONTINUED | OUTPATIENT
Start: 2024-08-07 | End: 2024-08-07

## 2024-08-07 RX ORDER — MAGNESIUM OXIDE 400 MG/1
800 TABLET ORAL ONCE
Status: COMPLETED | OUTPATIENT
Start: 2024-08-07 | End: 2024-08-07

## 2024-08-07 NOTE — PLAN OF CARE
Received pt A&Ox4, forgetful. Maintaining O2 on RA during the day, placed on 0.5L overnight with sleep. NSR on tele. BLE pitting edema - diuretics started. Lovenox for VTE prophylaxis - held in MAR.. Tolerating diet. Voids via purwick. Up with assistance and walker. Denies Pain. New IV placed. Receiving IV abx. Daughter at bedside. Plan of care continues, no further needs at this time.     Problem: Patient/Family Goals  Goal: Patient/Family Long Term Goal  Description: Patient's Long Term Goal: go home    Interventions:  - See additional Care Plan goals for specific interventions  Outcome: Progressing  Goal: Patient/Family Short Term Goal  Description: Patient's Short Term Goal:  8/3 NOC: sleep  8/4 NOC: sleep, have IVF stopped for night  8/5 AM: get all procedures done today  8/5 NOC: paracentesis in AM  8/6 AM: see physical therapy today  8/6 NOC: get rest  Interventions:   - cluster care  - See additional Care Plan goals for specific interventions  Outcome: Progressing     Problem: CARDIOVASCULAR - ADULT  Goal: Maintains optimal cardiac output and hemodynamic stability  Description: INTERVENTIONS:  - Monitor vital signs, rhythm, and trends  - Monitor for bleeding, hypotension and signs of decreased cardiac output  - Evaluate effectiveness of vasoactive medications to optimize hemodynamic stability  - Monitor arterial and/or venous puncture sites for bleeding and/or hematoma  - Assess quality of pulses, skin color and temperature  - Assess for signs of decreased coronary artery perfusion - ex. Angina  - Evaluate fluid balance, assess for edema, trend weights  Outcome: Progressing     Problem: RESPIRATORY - ADULT  Goal: Achieves optimal ventilation and oxygenation  Description: INTERVENTIONS:  - Assess for changes in respiratory status  - Assess for changes in mentation and behavior  - Position to facilitate oxygenation and minimize respiratory effort  - Oxygen supplementation based on oxygen saturation or ABGs  -  Provide Smoking Cessation handout, if applicable  - Encourage broncho-pulmonary hygiene including cough, deep breathe, Incentive Spirometry  - Assess the need for suctioning and perform as needed  - Assess and instruct to report SOB or any respiratory difficulty  - Respiratory Therapy support as indicated  - Manage/alleviate anxiety  - Monitor for signs/symptoms of CO2 retention  Outcome: Progressing     Problem: GASTROINTESTINAL - ADULT  Goal: Maintains adequate nutritional intake (undernourished)  Description: INTERVENTIONS:  - Monitor percentage of each meal consumed  - Identify factors contributing to decreased intake, treat as appropriate  - Assist with meals as needed  - Monitor I&O, WT and lab values  - Obtain nutritional consult as needed  - Optimize oral hygiene and moisture  - Encourage food from home; allow for food preferences  - Enhance eating environment  Outcome: Progressing     Problem: SKIN/TISSUE INTEGRITY - ADULT  Goal: Skin integrity remains intact  Description: INTERVENTIONS  - Assess and document risk factors for pressure ulcer development  - Assess and document skin integrity  - Monitor for areas of redness and/or skin breakdown  - Initiate interventions, skin care algorithm/standards of care as needed  Outcome: Progressing  Goal: Incision(s), wounds(s) or drain site(s) healing without S/S of infection  Description: INTERVENTIONS:  - Assess and document risk factors for pressure ulcer development  - Assess and document skin integrity  - Assess and document dressing/incision, wound bed, drain sites and surrounding tissue  - Implement wound care per orders  - Initiate isolation precautions as appropriate  - Initiate Pressure Ulcer prevention bundle as indicated  Outcome: Progressing

## 2024-08-07 NOTE — CM/SW NOTE
Department  notified of request for HHC, aidin referrals started. Assigned CM/SW to follow up with pt/family on further discharge planning.     Tonie Lomeli, DSC

## 2024-08-07 NOTE — PROGRESS NOTES
[facility] Progress Note     SUBJECTIVE/Interval history:  No acute events overnight, she feels better today , less cough. No dyspnea. No pain  Weaned to RA    Review of Systems:   A comprehensive 14 point review of systems was completed.   Pertinent positives and negatives noted in the HPI.    Medications  Reviewed personally   magnesium sulfate  2 g Intravenous Once    ampicillin-sulbactam  3 g Intravenous q12h    furosemide  20 mg Oral Daily    spironolactone  50 mg Oral Daily    fluticasone-salmeterol  1 puff Inhalation BID    benzonatate  100 mg Oral TID    multivitamin with minerals  1 tablet Oral Daily    ampicillin-sulbactam  3 g Intravenous q12h    pantoprazole  40 mg Intravenous Daily    [Held by provider] enoxaparin  40 mg Subcutaneous Daily    guaiFENesin ER  600 mg Oral BID       metoclopramide    acetaminophen    melatonin    glycerin-hypromellose-    sodium chloride    polyethylene glycol (PEG 3350)    sennosides    bisacodyl    fleet enema    ondansetron    OBJECTIVE:  Vitals:    08/07/24 0035 08/07/24 0045 08/07/24 0407 08/07/24 0813   BP:   115/55 98/53   BP Location:   Right arm Right arm   Pulse:   87 80   Resp:   17 16   Temp:   97.4 °F (36.3 °C) 97.6 °F (36.4 °C)   TempSrc:   Oral Oral   SpO2: (!) 88% 93% 96% 95%   Weight:       Height:           Vital signs in last 24 hours:  Blood pressure 98/53, pulse 80, temperature 97.6 °F (36.4 °C), temperature source Oral, resp. rate 16, height 5' 2\" (1.575 m), weight 135 lb (61.2 kg), SpO2 95%.     Intake/Output:  I/O last 3 completed shifts:  In: -   Out: 200 [Urine:200]       Physical Exam:  General: Appears alert, comfortable. No acute distress  Neurologic:No focal deficits.  Alert.  Oriented.  Lungs:CTAB today. No wheeze. No rhonchi  Heart:RRR no m  Abdomen:Soft, non-tender.  No masses.  No guarding.  No rebound.  Extremities:no edema    Lab Data Review:   Recent Labs   Lab 08/05/24  0838 08/06/24  0712  08/07/24  0531   GLU 77 82 101*   BUN 15 17 16   CREATSERUM 1.35* 1.37* 1.37*   CA 9.2 9.0 9.1   * 134* 132*   K 3.6  3.6 3.4* 3.7  3.7    106 104   CO2 19.0* 20.0* 22.0     Recent Labs   Lab 08/03/24  1313 08/04/24  1048 08/05/24  0838 08/07/24  0531   RBC 3.00* 2.88* 2.75* 2.74*   HGB 11.0* 10.4* 9.9* 10.0*   HCT 33.1* 30.9* 29.7* 28.1*   .3* 107.3* 108.0* 102.6*   MCH 36.7* 36.1* 36.0* 36.5*   MCHC 33.2 33.7 33.3 35.6   RDW 16.2 16.2 16.2 15.6   NEPRELIM 6.66 5.92  --   --    WBC 9.2 8.9 6.5 6.0   .0 195.0 197.0 153.0     No results for input(s): \"BNP\" in the last 168 hours.  No results for input(s): \"TROP\", \"CK\" in the last 168 hours.  Recent Labs   Lab 08/03/24  1438 08/05/24  0838 08/07/24  0531   INR 1.68* 1.64* 1.67*     No results for input(s): \"ABGPHT\", \"DRSZJX8I\", \"SLYWS3V\", \"ABGHCO3\", \"ABGBE\", \"TEMP\", \"PETER\", \"SITE\", \"DEV\", \"THGB\" in the last 168 hours.    Invalid input(s): \"XOW55TLE\", \"CHOB\"    Other Labs:  Interval Culture Data:   Hospital Encounter on 08/03/24   1. Body Fluid Cult Aerobic and Anaerobic     Status: None (Preliminary result)    Collection Time: 08/06/24 10:45 AM    Specimen: Ascites fluid; Body fluid, unspecified   Result Value Ref Range    Body Fluid Smear No WBCs seen N/A    Body Fluid Smear No organisms seen N/A    Body Fluid Smear This is a cytocentrifuged smear. N/A   2. Blood Culture     Status: None (Preliminary result)    Collection Time: 08/03/24  4:32 PM    Specimen: Blood,peripheral   Result Value Ref Range    Blood Culture Result No Growth 3 Days N/A     No results for input(s): \"COLORUR\", \"CLARITY\", \"SPECGRAVITY\", \"GLUUR\", \"BILUR\", \"KETUR\", \"BLOODURINE\", \"PHURINE\", \"PROUR\", \"UROBILINOGEN\", \"NITRITE\", \"LEUUR\", \"WBCUR\", \"RBCUR\", \"BACUR\", \"EPIUR\" in the last 168 hours.    Interval Radiology:   Reviewed personally  US PARACENTESIS W IMAGING (CPT=49083)    Result Date: 8/6/2024  CONCLUSION:  Ultrasound-guided paracentesis was performed without  complication.   LOCATION:  [facility]     Dictated by (CST): Zac Hall MD on 8/06/2024 at 11:04 AM     Finalized by (CST): Zac Hall MD on 8/06/2024 at 11:05 AM       US THORACENTESIS GUIDED LEFT (CPT=32555)    Result Date: 8/5/2024  CONCLUSION: Successful left sided thoracentesis without complication   LOCATION:  [facility]    Dictated by (CST): Anaid Vee MD on 8/05/2024 at 4:02 PM     Finalized by (CST): Anaid Vee MD on 8/05/2024 at 4:03 PM       US ABDOMEN DOPPLER ONLY (CPT=93975)    Result Date: 8/5/2024  CONCLUSION:  1. Cirrhotic morphology of the liver. 2. Nonspecific moderate volume ascites. 3. No flow detected in the portal vein segments.  This may be related to slow or balanced flow, as patency was demonstrated on the CT scan from 2 days ago. 4. Bowel gas obscuration of the pancreas.    LOCATION:  [facility]    Dictated by (CST): Wilberto Farrell MD on 8/05/2024 at 2:42 PM     Finalized by (CST): Wilberto Farrell MD on 8/05/2024 at 2:48 PM       XR CHEST AP PORTABLE  (CPT=71045)    Result Date: 8/5/2024  CONCLUSION:  No pneumothorax after left-sided thoracentesis.   LOCATION:  [facility]      Dictated by (CST): Wilberto Farrell MD on 8/05/2024 at 2:42 PM     Finalized by (CST): Wilberto Farrell MD on 8/05/2024 at 2:42 PM       XR CHEST AP PORTABLE  (CPT=71045)    Result Date: 8/5/2024  CONCLUSION:  No significant change.  Persistent left pleural effusion and left lower lobe pneumonia/atelectasis.   LOCATION:  [facility]      Dictated by (CST): David Leavitt MD on 8/05/2024 at 11:52 AM     Finalized by (CST): David Leavitt MD on 8/05/2024 at 11:53 AM        Assessment:  Acute hypoxic respiratory failure due to pneumonia, pleural effusion  LLL consolidation/pneumonia, community acquired  Left pleural effusion, s/p thoracentesis with analysis consistent with transudate  Cirrhosis of liver on CT abdomen   Transaminitis: Mildly elevated AST on labs  Hypertension  Mild to moderate mixed plaque in the aorta and iliac  arteries  Anemia  Hyperbilirubinemia/jaundice  Hyponatremia     Plan:  Monitor respiratory status, goal sats >89%  Complete empiric abx - unasyn day 4 of 5. Completed azithromycin  Continue on ICS/LABA to aid cough  Benzonatate PRN  Discharge planning - no objection to discharge today with outpatient follow up  She will require outpatient f/u CT chest in 2-3months    Sacha Alonso MD

## 2024-08-07 NOTE — PROGRESS NOTES
Inpatient Follow up Note    Gayla Howe Patient Status:  Inpatient    1940 MRN [id number]   Location [facility] 5NW-A Attending Janis Jimenes MD   Hosp Day # 4 PCP Malgorzata Villalta MD     Reason for Consultation   Cirrhosis, ascites     Subjective   Her appetite is better, ate dinner last night. No confusion, GI bleeding. Na 132, same as on admission. No abdominal pain.             Objective:     /55 (BP Location: Right arm)   Pulse 87   Temp 97.4 °F (36.3 °C) (Oral)   Resp 17   Ht 5' 2\" (1.575 m)   Wt 135 lb (61.2 kg)   SpO2 96%   BMI 24.69 kg/m²   Gen: AAOx3, no asterixis  CV: RRR with normal S1 / S2  Resp: CTA bilaterally  Abd: (+)BS, soft, non-tender, non-distended; no rebound or guarding  Ext: 1+ edema bilaterally   Skin: Warm and dry     Labs/Imaging     LABRCNTIP[PGLU:5@  Recent Labs   Lab 24  1438 24  0838 24  0531   INR 1.68* 1.64* 1.67*         Recent Labs   Lab 24  1313 24  1048 24  0838 24  0531   WBC 9.2 8.9 6.5 6.0   HGB 11.0* 10.4* 9.9* 10.0*   .0 195.0 197.0 153.0       Recent Labs   Lab 24  1313 24  1048 24  0838 24  0712 24  0531   * 132* 134* 134* 132*   K 5.1 3.2* 3.6  3.6 3.4* 3.7  3.7    102 105 106 104   CO2 18.0* 19.0* 19.0* 20.0* 22.0   BUN 20 18 15 17 16   CREATSERUM 1.57* 1.41* 1.35* 1.37* 1.37*       Recent Labs   Lab 24  1313 24  1048 24  0838 24  0712 24  0531   ALT 36 33 34 30 31   * 101* 101* 94* 91*     US PARACENTESIS W IMAGING (CPT=49083)    Result Date: 2024  CONCLUSION:  Ultrasound-guided paracentesis was performed without complication.   LOCATION:  [facility]     Dictated by (CST): Zac Hall MD on 2024 at 11:04 AM     Finalized by (CST): Zac Hall MD on 2024 at 11:05 AM       US THORACENTESIS GUIDED LEFT (CPT=32555)    Result Date: 2024  CONCLUSION: Successful left sided thoracentesis without  complication   LOCATION:  [facility]    Dictated by (CST): Anaid Vee MD on 8/05/2024 at 4:02 PM     Finalized by (CST): Anaid Vee MD on 8/05/2024 at 4:03 PM       US ABDOMEN DOPPLER ONLY (CPT=93975)    Result Date: 8/5/2024  CONCLUSION:  1. Cirrhotic morphology of the liver. 2. Nonspecific moderate volume ascites. 3. No flow detected in the portal vein segments.  This may be related to slow or balanced flow, as patency was demonstrated on the CT scan from 2 days ago. 4. Bowel gas obscuration of the pancreas.    LOCATION:  [facility]    Dictated by (CST): Wilberto Farrell MD on 8/05/2024 at 2:42 PM     Finalized by (CST): Wilberto Farrell MD on 8/05/2024 at 2:48 PM       XR CHEST AP PORTABLE  (CPT=71045)    Result Date: 8/5/2024  CONCLUSION:  No pneumothorax after left-sided thoracentesis.   LOCATION:  [facility]      Dictated by (CST): Wilberto Farrell MD on 8/05/2024 at 2:42 PM     Finalized by (CST): Wilberto Farrell MD on 8/05/2024 at 2:42 PM       XR CHEST AP PORTABLE  (CPT=71045)    Result Date: 8/5/2024  CONCLUSION:  No significant change.  Persistent left pleural effusion and left lower lobe pneumonia/atelectasis.   LOCATION:  [facility]      Dictated by (CST): David Leavitt MD on 8/05/2024 at 11:52 AM     Finalized by (CST): David Leavitt MD on 8/05/2024 at 11:53 AM      AST   Date/Time Value Ref Range Status   08/07/2024 05:31 AM 91 (H) <34 U/L Final     ALT   Date/Time Value Ref Range Status   08/07/2024 05:31 AM 31 10 - 49 U/L Final     BUN   Date/Time Value Ref Range Status   08/07/2024 05:31 AM 16 9 - 23 mg/dL Final              Assessment   Gayla Howe is a 84 year old female with history of hypertension that presented to the ER with decreased appetite and also found to have elevated liver enzymes, cirrhosis, and ascites. The etiology of her cirrhosis is not known currently, but may be due to alcohol given her reported recent increase in alcohol use per her . She has portal HTN manifested with ascites, and had a  paracentesis on 8/6 with an elevated SAAG and TP <2.5; it was negative for SBP.          Plan   -low sodium, high protein diet  -continue lasix 20mg every day, spironolactone 50mg every day   -appreciate pulm reccs for pleural effusion, on Abx for PNA  -f/u chronic liver disease workup  -daily pantoprazole  -repeat CBC, CMP, INR 8/8am if still hospitalized  -EGD vs fibroscan and platelet monitoring for variceal screening as outpatient due to current PNA/pleural effusion  -HCC screening in 2/2025  -will need repeat CMP/CBC/INR in 1 week as outpatient; from GI standpoint, ok to discharge today, however will need to clear with pulm and also determine her disposition (home, rehab, etc)  -we will help arrange outpatient GI f/u         Mina Correa MD  8:05 AM  8/7/2024  [facility]  [phone]

## 2024-08-07 NOTE — CM/SW NOTE
08/07/24 1300   Choice of Post-Acute Provider   Informed patient of right to choose their preferred provider Yes   List of appropriate post-acute services provided to patient/family with quality data Yes   Patient/family choice [facility]  [phone]   Information given to Patient;Daughter     Family chose [facility].  Reserved in aidin.  AVS sent.    Reshma Graham LCSW  /Discharge Planner

## 2024-08-07 NOTE — DISCHARGE SUMMARY
[place] HOSPITALIST  DISCHARGE SUMMARY     Gayla Howe Patient Status:  Inpatient    1940 MRN [id number]   Location [facility] 5NW-A Attending Janis Jimenes MD   Hosp Day # 4 PCP Malgorzata Villalta MD     Date of Admission: 8/3/2024  Date of Discharge:   24    Discharge Disposition: Home or Self Care    Discharge Diagnosis:    # acute hypoxic respiratory failure due to PNA, pleural effusion , resolved     #Loculated left pleural effusion, transudate     #Community acquired pneumonia     #Cirrhotic features on CT  Denies alcohol use  INR elevated, Bili 6.3 -> 4.7 -> 3.5  GI following, workup underway  - continue diuretic upon dc  - EGD as outpatient   - PPI      #Ascites  Paracentesis 8.6    #LE swelling, SOB  Echo with normal EF    #CKD 3B    #Hyponatremia    #Macrocytic anemia     #medical debility/deconditioning    History of Present Illness:      aGyla Howe is a 84 year old female with a PMH of HTN who presents with weakness, dec appetite.  Symptoms started about 1 week ago.  Denies any fever, chills, n/v.  She does report diarrhea, nonbloody.  No other acute complaints.       Brief Synopsis: she has been treated with antibiotic. Underwent thoracentesis. In addition underwent paracentesis and her cirrhosis work up in progress. She has improved and will be dc today with follow up     Lace+ Score: 45  59-90 High Risk  29-58 Medium Risk  0-28   Low Risk       TCM Follow-Up Recommendation:  LACE 29-58: Moderate Risk of readmission after discharge from the hospital.    Consultants:  Pulmonary, GI     Discharge Medication List:     Discharge Medications        START taking these medications        Instructions Prescription details   amoxicillin clavulanate 875-125 MG Tabs  Commonly known as: Augmentin      Take 1 tablet by mouth 2 (two) times daily for 1 day.   Stop taking on: 2024  Quantity: 2 tablet  Refills: 0     benzonatate 100 MG Caps  Commonly known as: Tessalon      Take 1  capsule (100 mg total) by mouth 3 (three) times daily as needed for cough.   Quantity: 30 capsule  Refills: 1     fluticasone-salmeterol 250-50 MCG/ACT Aepb  Commonly known as: Advair Diskus      Inhale 1 puff into the lungs 2 (two) times daily.   Quantity: 1 each  Refills: 0            CONTINUE taking these medications        Instructions Prescription details   acetaminophen 500 MG Tabs  Commonly known as: Tylenol Extra Strength      Take 1 tablet (500 mg total) by mouth every 6 (six) hours as needed for Pain.   Refills: 0            STOP taking these medications      ibuprofen 200 MG Tabs  Commonly known as: Motrin                  Where to Get Your Medications        These medications were sent to [id number] - [place], [place] - [address] [phone], [phone] [address] [address]      Phone: [phone]   amoxicillin clavulanate 875-125 MG Tabs  benzonatate 100 MG Caps  fluticasone-salmeterol 250-50 MCG/ACT Aepb         [place]    Follow-up appointment:   Prabhu Carbajal MD  [address]  [address]  [facility] [address] [phone]    Schedule an appointment as soon as possible for a visit in 2 week(s)      Malgorzata Villalta MD  [address] [place] [address] [address]  [facility] [address] [phone]    Follow up in 1 week(s)  Follow up    Appointments for Next 30 Days 2024 - 2024      None            Vital signs:  Temp:  [97.2 °F (36.2 °C)-97.6 °F (36.4 °C)] 97.6 °F (36.4 °C)  Pulse:  [80-89] 80  Resp:  [16-18] 16  BP: ()/(42-58) 98/53  SpO2:  [88 %-96 %] 95 %    Physical Exam:    General: No acute distress   Lungs: clear to auscultation  Cardiovascular: S1, S2  Abdomen: Soft NTND    -----------------------------------------------------------------------------------------------  PATIENT DISCHARGE INSTRUCTIONS: See electronic chart    Janis Jimenes MD    Total time spent on discharge plannin minutes     The  Cures Act makes medical notes  like these available to patients in the interest of transparency. Please be advised this is a medical document. Medical documents are intended to carry relevant information, facts as evident, and the clinical opinion of the practitioner. The medical note is intended as peer to peer communication and may appear blunt or direct. It is written in medical language and may contain abbreviations or verbiage that are unfamiliar.

## 2024-08-07 NOTE — PROGRESS NOTES
NURSING DISCHARGE NOTE    Discharged Home via Wheelchair.  Accompanied by Family member, Spouse, and Support staff  Belongings Taken by patient/family.        Discharge instructions explained to patient and family at bedside. IV removed. Patient's belongings taken by patient. No further questions at this time.

## 2024-08-07 NOTE — CM/SW NOTE
08/07/24 1100   CM/SW Referral Data   Referral Source Physician   Reason for Referral Discharge planning   Informant Patient;Daughter       Sw met with pt , her daughter and grandson's girlfriend this am to discuss dc planning.  Pt admitted due to pneumonia.  She lives with her  .   PT recs [facility]. List given-daughter would like to review list. Sw contact information given.  Provided private duty caregiver list also and [facility] brochure.    Per PT:      HOME SITUATION  Type of Home: House   Home Layout: Two level  Stairs to Enter : 2  Railing: Yes  Stairs to Bedroom: 12  Railing: Yes     Lives With: Spouse  Drives: No  Patient Owned Equipment: Rolling walker     Prior Level of [place]: Per pt report, pt uses a RW for ambulation, and has assistance from spouse with ADL as needed. Pt's spouse performs driving and IADL.    Reshma Graham LCSW  /Discharge Planner

## 2024-08-08 LAB — NON GYNE INTERPRETATION: NEGATIVE

## 2024-08-15 PROBLEM — N18.30 CKD (CHRONIC KIDNEY DISEASE) STAGE 3, GFR 30-59 ML/MIN (HCC): Status: ACTIVE | Noted: 2019-12-27

## 2024-08-15 PROBLEM — J15.7 PNEUMONIA DUE TO MYCOPLASMA PNEUMONIAE: Status: ACTIVE | Noted: 2020-01-13

## 2024-09-12 ENCOUNTER — APPOINTMENT (OUTPATIENT)
Dept: GENERAL RADIOLOGY | Facility: HOSPITAL | Age: 84
End: 2024-09-12
Attending: EMERGENCY MEDICINE
Payer: MEDICARE

## 2024-09-12 ENCOUNTER — HOSPITAL ENCOUNTER (INPATIENT)
Facility: HOSPITAL | Age: 84
LOS: 5 days | Discharge: HOME HEALTH | End: 2024-09-17
Attending: EMERGENCY MEDICINE | Admitting: HOSPITALIST
Payer: MEDICARE

## 2024-09-12 DIAGNOSIS — Z71.89 GOALS OF CARE, COUNSELING/DISCUSSION: ICD-10-CM

## 2024-09-12 DIAGNOSIS — R18.8 OTHER ASCITES: ICD-10-CM

## 2024-09-12 DIAGNOSIS — N18.32 STAGE 3B CHRONIC KIDNEY DISEASE (HCC): ICD-10-CM

## 2024-09-12 DIAGNOSIS — J18.9 PNEUMONIA DUE TO INFECTIOUS ORGANISM, UNSPECIFIED LATERALITY, UNSPECIFIED PART OF LUNG: ICD-10-CM

## 2024-09-12 DIAGNOSIS — K76.82 HEPATIC ENCEPHALOPATHY (HCC): Primary | ICD-10-CM

## 2024-09-12 LAB
ALBUMIN SERPL-MCNC: 2.5 G/DL (ref 3.2–4.8)
ALBUMIN/GLOB SERPL: 0.5 {RATIO} (ref 1–2)
ALP LIVER SERPL-CCNC: 141 U/L
ALT SERPL-CCNC: 17 U/L
AMMONIA PLAS-MCNC: 132 UMOL/L (ref 11–32)
ANION GAP SERPL CALC-SCNC: 13 MMOL/L (ref 0–18)
AST SERPL-CCNC: 71 U/L (ref ?–34)
BASOPHILS # BLD AUTO: 0.06 X10(3) UL (ref 0–0.2)
BASOPHILS NFR BLD AUTO: 0.7 %
BILIRUB SERPL-MCNC: 4.2 MG/DL (ref 0.2–1.1)
BILIRUB UR QL CFM: POSITIVE
BUN BLD-MCNC: 25 MG/DL (ref 9–23)
CALCIUM BLD-MCNC: 9.8 MG/DL (ref 8.7–10.4)
CHLORIDE SERPL-SCNC: 101 MMOL/L (ref 98–112)
CO2 SERPL-SCNC: 19 MMOL/L (ref 21–32)
COLOR UR AUTO: YELLOW
CREAT BLD-MCNC: 1.99 MG/DL
EGFRCR SERPLBLD CKD-EPI 2021: 24 ML/MIN/1.73M2 (ref 60–?)
EOSINOPHIL # BLD AUTO: 0.19 X10(3) UL (ref 0–0.7)
EOSINOPHIL NFR BLD AUTO: 2.2 %
ERYTHROCYTE [DISTWIDTH] IN BLOOD BY AUTOMATED COUNT: 14.7 %
GLOBULIN PLAS-MCNC: 4.9 G/DL (ref 2–3.5)
GLUCOSE BLD-MCNC: 105 MG/DL (ref 70–99)
GLUCOSE UR STRIP.AUTO-MCNC: NORMAL MG/DL
HCT VFR BLD AUTO: 31.4 %
HGB BLD-MCNC: 11.2 G/DL
HYALINE CASTS #/AREA URNS AUTO: PRESENT /LPF
IMM GRANULOCYTES # BLD AUTO: 0.04 X10(3) UL (ref 0–1)
IMM GRANULOCYTES NFR BLD: 0.5 %
INR BLD: 1.71 (ref 0.8–1.2)
KETONES UR STRIP.AUTO-MCNC: NEGATIVE MG/DL
LACTATE SERPL-SCNC: 2.6 MMOL/L (ref 0.5–2)
LACTATE SERPL-SCNC: 3.8 MMOL/L (ref 0.5–2)
LEUKOCYTE ESTERASE UR QL STRIP.AUTO: NEGATIVE
LYMPHOCYTES # BLD AUTO: 1.43 X10(3) UL (ref 1–4)
LYMPHOCYTES NFR BLD AUTO: 16.5 %
MCH RBC QN AUTO: 33.5 PG (ref 26–34)
MCHC RBC AUTO-ENTMCNC: 35.7 G/DL (ref 31–37)
MCV RBC AUTO: 94 FL
MONOCYTES # BLD AUTO: 0.59 X10(3) UL (ref 0.1–1)
MONOCYTES NFR BLD AUTO: 6.8 %
NEUTROPHILS # BLD AUTO: 6.35 X10 (3) UL (ref 1.5–7.7)
NEUTROPHILS # BLD AUTO: 6.35 X10(3) UL (ref 1.5–7.7)
NEUTROPHILS NFR BLD AUTO: 73.3 %
NITRITE UR QL STRIP.AUTO: NEGATIVE
NT-PROBNP SERPL-MCNC: 1571 PG/ML (ref ?–450)
OSMOLALITY SERPL CALC.SUM OF ELEC: 281 MOSM/KG (ref 275–295)
PH UR STRIP.AUTO: 5 [PH] (ref 5–8)
PLATELET # BLD AUTO: 191 10(3)UL (ref 150–450)
POTASSIUM SERPL-SCNC: 4.1 MMOL/L (ref 3.5–5.1)
PROCALCITONIN SERPL-MCNC: 0.43 NG/ML (ref ?–0.05)
PROT SERPL-MCNC: 7.4 G/DL (ref 5.7–8.2)
PROTHROMBIN TIME: 20.3 SECONDS (ref 11.6–14.8)
RBC # BLD AUTO: 3.34 X10(6)UL
RBC UR QL AUTO: NEGATIVE
SARS-COV-2 RNA RESP QL NAA+PROBE: NOT DETECTED
SODIUM SERPL-SCNC: 133 MMOL/L (ref 136–145)
SP GR UR STRIP.AUTO: 1.02 (ref 1–1.03)
TROPONIN I SERPL HS-MCNC: 27 NG/L
UROBILINOGEN UR STRIP.AUTO-MCNC: 3 MG/DL
WBC # BLD AUTO: 8.7 X10(3) UL (ref 4–11)

## 2024-09-12 PROCEDURE — 71045 X-RAY EXAM CHEST 1 VIEW: CPT | Performed by: EMERGENCY MEDICINE

## 2024-09-12 PROCEDURE — 99497 ADVNCD CARE PLAN 30 MIN: CPT | Performed by: HOSPITALIST

## 2024-09-12 PROCEDURE — 99223 1ST HOSP IP/OBS HIGH 75: CPT | Performed by: HOSPITALIST

## 2024-09-12 RX ORDER — SENNOSIDES 8.6 MG
17.2 TABLET ORAL NIGHTLY PRN
Status: DISCONTINUED | OUTPATIENT
Start: 2024-09-12 | End: 2024-09-17

## 2024-09-12 RX ORDER — LACTULOSE 10 G/15ML
20 SOLUTION ORAL ONCE
Status: COMPLETED | OUTPATIENT
Start: 2024-09-12 | End: 2024-09-12

## 2024-09-12 RX ORDER — HEPARIN SODIUM 5000 [USP'U]/ML
5000 INJECTION, SOLUTION INTRAVENOUS; SUBCUTANEOUS EVERY 12 HOURS SCHEDULED
Status: DISCONTINUED | OUTPATIENT
Start: 2024-09-12 | End: 2024-09-17

## 2024-09-12 RX ORDER — POLYETHYLENE GLYCOL 3350 17 G/17G
17 POWDER, FOR SOLUTION ORAL DAILY PRN
Status: DISCONTINUED | OUTPATIENT
Start: 2024-09-12 | End: 2024-09-17

## 2024-09-12 RX ORDER — SODIUM CHLORIDE 9 MG/ML
125 INJECTION, SOLUTION INTRAVENOUS CONTINUOUS
Status: DISCONTINUED | OUTPATIENT
Start: 2024-09-12 | End: 2024-09-12

## 2024-09-12 RX ORDER — SODIUM CHLORIDE 9 MG/ML
INJECTION, SOLUTION INTRAVENOUS CONTINUOUS
Status: ACTIVE | OUTPATIENT
Start: 2024-09-12 | End: 2024-09-13

## 2024-09-12 RX ORDER — ONDANSETRON 2 MG/ML
4 INJECTION INTRAMUSCULAR; INTRAVENOUS EVERY 6 HOURS PRN
Status: DISCONTINUED | OUTPATIENT
Start: 2024-09-12 | End: 2024-09-17

## 2024-09-12 RX ORDER — BISACODYL 10 MG
10 SUPPOSITORY, RECTAL RECTAL
Status: DISCONTINUED | OUTPATIENT
Start: 2024-09-12 | End: 2024-09-17

## 2024-09-12 RX ORDER — LACTULOSE 10 G/15ML
20 SOLUTION ORAL 3 TIMES DAILY
Status: DISCONTINUED | OUTPATIENT
Start: 2024-09-12 | End: 2024-09-17

## 2024-09-12 RX ORDER — MELATONIN
3 NIGHTLY PRN
Status: DISCONTINUED | OUTPATIENT
Start: 2024-09-12 | End: 2024-09-17

## 2024-09-12 RX ORDER — METOCLOPRAMIDE HYDROCHLORIDE 5 MG/ML
5 INJECTION INTRAMUSCULAR; INTRAVENOUS EVERY 8 HOURS PRN
Status: DISCONTINUED | OUTPATIENT
Start: 2024-09-12 | End: 2024-09-17

## 2024-09-12 RX ORDER — ACETAMINOPHEN 500 MG
500 TABLET ORAL EVERY 4 HOURS PRN
Status: DISCONTINUED | OUTPATIENT
Start: 2024-09-12 | End: 2024-09-17

## 2024-09-12 NOTE — ED INITIAL ASSESSMENT (HPI)
Patient called back and scheduled for lab . Pt to ER \"weakness, bloated abd, not eating\". Family reports she was admitted to hospital 1x month ago and found out she has cirrhosis of liver. Patient axox2. Patient denying any pain.

## 2024-09-13 PROBLEM — Z71.89 GOALS OF CARE, COUNSELING/DISCUSSION: Status: ACTIVE | Noted: 2024-01-01

## 2024-09-13 PROBLEM — R18.8 OTHER ASCITES: Status: ACTIVE | Noted: 2024-01-01

## 2024-09-13 PROBLEM — Z51.5 PALLIATIVE CARE ENCOUNTER: Status: ACTIVE | Noted: 2024-01-01

## 2024-09-13 PROBLEM — Z51.5 PALLIATIVE CARE ENCOUNTER: Status: ACTIVE | Noted: 2024-09-13

## 2024-09-13 PROBLEM — R18.8 OTHER ASCITES: Status: ACTIVE | Noted: 2024-09-13

## 2024-09-13 PROBLEM — Z71.89 GOALS OF CARE, COUNSELING/DISCUSSION: Status: ACTIVE | Noted: 2024-09-13

## 2024-09-13 LAB
ALBUMIN SERPL-MCNC: 2 G/DL (ref 3.2–4.8)
ALBUMIN/GLOB SERPL: 0.5 {RATIO} (ref 1–2)
ALP LIVER SERPL-CCNC: 116 U/L
ALT SERPL-CCNC: 14 U/L
ANION GAP SERPL CALC-SCNC: 11 MMOL/L (ref 0–18)
AST SERPL-CCNC: 31 U/L (ref ?–34)
ATRIAL RATE: 96 BPM
BASOPHILS # BLD AUTO: 0.07 X10(3) UL (ref 0–0.2)
BASOPHILS NFR BLD AUTO: 0.9 %
BILIRUB SERPL-MCNC: 3.5 MG/DL (ref 0.2–1.1)
BUN BLD-MCNC: 21 MG/DL (ref 9–23)
CALCIUM BLD-MCNC: 9.3 MG/DL (ref 8.7–10.4)
CHLORIDE SERPL-SCNC: 104 MMOL/L (ref 98–112)
CO2 SERPL-SCNC: 19 MMOL/L (ref 21–32)
CREAT BLD-MCNC: 1.88 MG/DL
EGFRCR SERPLBLD CKD-EPI 2021: 26 ML/MIN/1.73M2 (ref 60–?)
EOSINOPHIL # BLD AUTO: 0.5 X10(3) UL (ref 0–0.7)
EOSINOPHIL NFR BLD AUTO: 6.3 %
ERYTHROCYTE [DISTWIDTH] IN BLOOD BY AUTOMATED COUNT: 14.8 %
GLOBULIN PLAS-MCNC: 4 G/DL (ref 2–3.5)
GLUCOSE BLD-MCNC: 107 MG/DL (ref 70–99)
HCT VFR BLD AUTO: 26.7 %
HGB BLD-MCNC: 9.2 G/DL
IMM GRANULOCYTES # BLD AUTO: 0.03 X10(3) UL (ref 0–1)
IMM GRANULOCYTES NFR BLD: 0.4 %
LACTATE SERPL-SCNC: 1.9 MMOL/L (ref 0.5–2)
LACTATE SERPL-SCNC: 2.6 MMOL/L (ref 0.5–2)
LYMPHOCYTES # BLD AUTO: 1.93 X10(3) UL (ref 1–4)
LYMPHOCYTES NFR BLD AUTO: 24.3 %
MAGNESIUM SERPL-MCNC: 1.6 MG/DL (ref 1.6–2.6)
MCH RBC QN AUTO: 32.7 PG (ref 26–34)
MCHC RBC AUTO-ENTMCNC: 34.5 G/DL (ref 31–37)
MCV RBC AUTO: 95 FL
MONOCYTES # BLD AUTO: 0.8 X10(3) UL (ref 0.1–1)
MONOCYTES NFR BLD AUTO: 10.1 %
NEUTROPHILS # BLD AUTO: 4.6 X10 (3) UL (ref 1.5–7.7)
NEUTROPHILS # BLD AUTO: 4.6 X10(3) UL (ref 1.5–7.7)
NEUTROPHILS NFR BLD AUTO: 58 %
OSMOLALITY SERPL CALC.SUM OF ELEC: 281 MOSM/KG (ref 275–295)
P AXIS: 49 DEGREES
P-R INTERVAL: 158 MS
PLATELET # BLD AUTO: 136 10(3)UL (ref 150–450)
PLATELETS.RETICULATED NFR BLD AUTO: 3 % (ref 0–7)
POTASSIUM SERPL-SCNC: 3.1 MMOL/L (ref 3.5–5.1)
PROT SERPL-MCNC: 6 G/DL (ref 5.7–8.2)
Q-T INTERVAL: 378 MS
QRS DURATION: 82 MS
QTC CALCULATION (BEZET): 477 MS
R AXIS: -9 DEGREES
RBC # BLD AUTO: 2.81 X10(6)UL
SODIUM SERPL-SCNC: 134 MMOL/L (ref 136–145)
T AXIS: 109 DEGREES
VENTRICULAR RATE: 96 BPM
WBC # BLD AUTO: 7.9 X10(3) UL (ref 4–11)

## 2024-09-13 PROCEDURE — 99232 SBSQ HOSP IP/OBS MODERATE 35: CPT | Performed by: HOSPITALIST

## 2024-09-13 PROCEDURE — 99223 1ST HOSP IP/OBS HIGH 75: CPT | Performed by: NURSE PRACTITIONER

## 2024-09-13 RX ORDER — POTASSIUM CHLORIDE 1500 MG/1
40 TABLET, EXTENDED RELEASE ORAL ONCE
Status: COMPLETED | OUTPATIENT
Start: 2024-09-13 | End: 2024-09-13

## 2024-09-13 RX ORDER — MAGNESIUM OXIDE 400 MG/1
400 TABLET ORAL ONCE
Status: COMPLETED | OUTPATIENT
Start: 2024-09-13 | End: 2024-09-13

## 2024-09-13 RX ORDER — ALBUMIN, HUMAN INJ 5% 5 %
12.5 SOLUTION INTRAVENOUS ONCE
Status: COMPLETED | OUTPATIENT
Start: 2024-09-13 | End: 2024-09-13

## 2024-09-13 NOTE — PLAN OF CARE
Patient A&O x4 , forgetful at times , Tele NSR , RA O2 WNL. Patient ,denies pain , nausea and shortness of breath. Redness on sacrum and bilateral heels , refused bunny boots. MepIlex on sacrum ,dressing C/D/I, waffle cushion on place. Albumin bolus given. Family at bedside , updated on POC. Pt updated on POC, all questions and concerns addressed , patient verbalized understanding ,safety precautions place, no further needs at this time.    Problem: Patient/Family Goals  Goal: Patient/Family Long Term Goal  Description: Patient's Long Term Goal:   Discharge with adequate resources      Interventions:  - Follow care plan   - See additional Care Plan goals for specific interventions  Outcome: Progressing  Goal: Patient/Family Short Term Goal  Description: Patient's Short Term Goal:   Decrease ammonia levels     Interventions:   - Medication per MAR   - See additional Care Plan goals for specific interventions  Outcome: Progressing

## 2024-09-13 NOTE — H&P
[facility]     Gayla Howe Patient Status:  Emergency    1940 MRN [id number]   Location [facility] EMERGENCY DEPARTMENT Attending Goldy Jones,    Hosp Day # 0 PCP Mina Correa MD     Chief Complaint: Weakness and not eating    Subjective:    History of Present Illness:     Gayla Howe is a 84 year old female with PMHx cirrhosis, CKD3, macrocytic anemia, recent hospitalization last month - for acute resp failure 2/2 pna presents today w/ family at the bedside for concerns patient is getting more weak and having intermittent confusion.  Patient lives at home with her .  Family admits patient does not take any of her Lasix/spironolactone.  Patient has poor appetite.  Patient has reportedly been having intermittent confusion.  Patient does have cough residually from pneumonia last month.  Overall patient is feeling better from a respiratory/cough standpoint.    -S/p lactulose, IV Zosyn, ivf gtt, GI consult        History/Other:    Past Medical History:  Past Medical History:    Cirrhosis (HCC)    Essential hypertension    High blood pressure    Hoarseness, chronic    Leg swelling    Loss of appetite    Wears glasses     Past Surgical History:   History reviewed. No pertinent surgical history.   Family History:   Family History   Problem Relation Age of Onset    Other (Dyslexia) Mother     Heart Disorder Father      Social History:    reports that she has never smoked. She has never used smokeless tobacco. She reports that she does not currently use alcohol. She reports that she does not use drugs.     Allergies:   Allergies   Allergen Reactions    Codeine NAUSEA ONLY       Medications:    Current Facility-Administered Medications on File Prior to Encounter   Medication Dose Route Frequency Provider Last Rate Last Admin    [COMPLETED] magnesium oxide (Mag-Ox) tab 800 mg  800 mg Oral Once Janis Jimenes MD   800 mg at 24 0838    []  ondansetron (Zofran) 4 MG/2ML injection 4 mg  4 mg Intravenous Once PRN Zac Hall MD        [COMPLETED] magnesium oxide (Mag-Ox) tab 800 mg  800 mg Oral Once Emmanuel Bennett MD   800 mg at 24 1120    [COMPLETED] potassium chloride (Klor-Con) 20 MEQ oral powder 40 mEq  40 mEq Oral Once Emmanuel Bennett MD   40 mEq at 24 1120    [COMPLETED] magnesium oxide (Mag-Ox) tab 800 mg  800 mg Oral Once Emmanuel Bennett MD   800 mg at 24 1602    [COMPLETED] ampicillin-sulbactam (Unasyn) 3 g in sodium chloride 0.9% 100mL IVPB-ADD  3 g Intravenous q12h Augusto Easley MD   Stopped at 24 1252    [COMPLETED] magnesium oxide (Mag-Ox) tab 800 mg  800 mg Oral Once Emmanuel Bennett MD   800 mg at 24 1323    [COMPLETED] potassium chloride (Klor-Con M20) tab 40 mEq  40 mEq Oral Once Emmanuel Bennett MD   40 mEq at 24 1324    [COMPLETED] cefTRIAXone (Rocephin) 1 g in sodium chloride 0.9% 100 mL IVPB-ADDV  1 g Intravenous Once Nohemi Dong  mL/hr at 24 1645 1 g at 24 1645    [COMPLETED] azithromycin (Zithromax) 500 mg in sodium chloride 0.9% 250mL IVPB premix  500 mg Intravenous Once Nohemi Dong  mL/hr at 24 1735 500 mg at 24 1735    [] sodium chloride 0.9 % IV bolus 1,836 mL  30 mL/kg Intravenous Continuous Nohemi Dong  mL/hr at 24 1654 1,836 mL at 24 1654    [COMPLETED] iopamidol 76% (ISOVUE-370) injection for power injector  70 mL Intravenous ONCE PRN Nohemi Dong MD   70 mL at 24 1528    [COMPLETED] azithromycin (Zithromax) 500 mg in sodium chloride 0.9% 250mL IVPB premix  500 mg Intravenous Q24H Emmanuel Bennett  mL/hr at 24 1602 500 mg at 24 1602     Current Outpatient Medications on File Prior to Encounter   Medication Sig Dispense Refill    furosemide (LASIX) 20 MG Oral Tab Take 1 tablet (20 mg total) by mouth daily. (Patient not taking: Reported on 2024) 60 tablet 3    spironolactone 50 MG  Oral Tab Take 1 tablet (50 mg total) by mouth daily. (Patient not taking: Reported on 9/12/2024) 60 tablet 3    benzonatate 100 MG Oral Cap Take 1 capsule (100 mg total) by mouth 3 (three) times daily as needed for cough. (Patient not taking: Reported on 8/15/2024) 30 capsule 1    fluticasone-salmeterol 250-50 MCG/ACT Inhalation Aerosol Powder, Breath Activated Inhale 1 puff into the lungs 2 (two) times daily. (Patient not taking: Reported on 8/15/2024) 1 each 0       Review of Systems:   A comprehensive review of systems was completed.    Pertinent positives and negatives noted in the HPI.    Objective:   Physical Exam:    /69   Pulse 91   Temp 97.2 °F (36.2 °C) (Temporal)   Resp 17   Ht 5' 2\" (1.575 m)   Wt 135 lb (61.2 kg)   SpO2 97%   BMI 24.69 kg/m²   General: No acute distress, Alert  Respiratory: No rhonchi, no wheezes  Cardiovascular: S1, S2. Regular rate and rhythm  Abdomen: Soft, Non-tender, non-distended, positive bowel sounds  Neuro: No new focal deficits  Extremities: 1+ Le edema    Results:    Labs:      Labs Last 24 Hours:    Recent Labs   Lab 09/12/24  1713   RBC 3.34*   HGB 11.2*   HCT 31.4*   MCV 94.0   MCH 33.5   MCHC 35.7   RDW 14.7   NEPRELIM 6.35   WBC 8.7   .0       Recent Labs   Lab 09/12/24  1713   *   BUN 25*   CREATSERUM 1.99*   EGFRCR 24*   CA 9.8   ALB 2.5*   *   K 4.1      CO2 19.0*   ALKPHO 141   AST 71*   ALT 17   BILT 4.2*   TP 7.4       Lab Results   Component Value Date    INR 1.71 (H) 09/12/2024    INR 1.67 (H) 08/07/2024    INR 1.64 (H) 08/05/2024       Recent Labs   Lab 09/12/24  1713   TROPHS 27       Recent Labs   Lab 09/12/24  1713   PBNP 1,571*       Recent Labs   Lab 09/12/24  1713   PCT 0.43*       Imaging: Imaging data reviewed in Epic.    Assessment & Plan:      #Hepatic encephalopathy  -Lactulose    #Debility  -2/2 above  -therapies to see    #Alcohol-induced cirrhosis  #Ascites  -PTA: Spironolactone/Lasix (family reports non  compliance)  -recurrent hospitalizations, palliative care to see    #Hyponatremia  #NAGMA  #CKD3  -gentle iv hydration overnight    #Abnormal cxr  -s/p iv abx in ER, pt has cough but improving, pt has known left lobe opacity from pna in aug, for now will hold further pna abx     #Macrocytic anemia    #Pyuria  -unclear if pt having symptoms  -Empiric Ancef for now  -fu [facility]    #Recent hospitalization  -8/3-8/7 where patient was admitted for acute respiratory failure secondary to pneumonia.    #ACP  -DNR, 16 mins spent face ot face w/ patient and patient's family at the bedside. This was a voluntary conversation. We reviewed terms like cardiac arrest and the use of acls/cpr. Order updated on epic.         Plan of care discussed with ER physician, patient, patient's  and son at the bedside    Erin Alcantar,     Supplementary Documentation:     The 21st Century Cures Act makes medical notes like these available to patients in the interest of transparency. Please be advised this is a medical document. Medical documents are intended to carry relevant information, facts as evident, and the clinical opinion of the practitioner. The medical note is intended as peer to peer communication and may appear blunt or direct. It is written in medical language and may contain abbreviations or verbiage that are unfamiliar.

## 2024-09-13 NOTE — DIETARY NOTE
[facility]   part of [facility]    NUTRITION ASSESSMENT    Pt meets moderate malnutrition criteria at this time.    CRITERIA FOR MALNUTRITION DIAGNOSIS:  Criteria for non-severe malnutrition diagnosis: acute illness/injury related to energy intake less than 75% for greater than 7 days, fluid accumulation mild, and moderate muscle and fat wasting        NUTRITION INTERVENTION:    RD nutrition Care Plan- See RD nutrition assessment for additional recommendations  Meal and Snacks - Monitor and encourage adequate PO intake. Encouraged protein intake.      PATIENT STATUS: 09/13/24 84/F admitted d/t low appetite, distention and low PO. Pt diagnosed with encephalopathy. Pt screened d/t MST and consult for at risk. Pt appetite low for 1-2 weeks but was svetlana to eat a little today: scrambled eggs, fruit, toast, tea.  in room answered all questions. He reported not knowing if there was wt loss and stated her normal wt being 135 lbs. Pt was offered EPHP, Magic Cup, and Ensure Clear, but refused all of them.  was told to encourage higher protein meals such as the roasted turkey. No other complaints at this time.     PMH: cirrhosis, CKD 3, macro anemia, acute RF.      ANTHROPOMETRICS:  Ht: 157.5 cm (5' 2\")  Wt: 64.1 kg (141 lb 4.8 oz).   BMI: Body mass index is 25.84 kg/m².  IBW: 50 kg      WEIGHT HISTORY:   Weight loss: No: wt can be altered d/t edema    Wt Readings from Last 10 Encounters:   09/12/24 64.1 kg (141 lb 4.8 oz)   08/15/24 61.2 kg (135 lb)   08/05/24 61.2 kg (135 lb)   05/10/22 65.8 kg (145 lb)   09/28/21 65.8 kg (145 lb)   08/31/21 65.8 kg (145 lb)   08/17/21 65.8 kg (145 lb)   07/27/21 66.7 kg (147 lb)   07/23/21 67.1 kg (147 lb 14.9 oz)   02/10/20 67.1 kg (148 lb)        NUTRITION:  Diet:       Procedures    Cardiac diet Cardiac; Is Patient on Accuchecks? No      Food Allergies: No  Cultural/Ethnic/Lutheran Preferences Addressed: Yes    Percent Meals Eaten (last 3 days)       Date/Time  Percent Meals Eaten (%)    09/13/24 1000 100 %            GI system review: WNL Last BM: 9/12  Skin and wounds: redness on sacrum    NUTRITION RELATED PHYSICAL FINDINGS:     1. Body Fat/Muscle Mass: moderate depletion body fat Triceps and moderate muscle depletion Temple region, Clavicle region, and Shoulder/Acromion process     2. Fluid Accumulation: lower extremity edema     NUTRITION PRESCRIPTION: 64.1 kg  Calories: 7398-2331 calories/day (25-30 kcal/kg)  Protein: 77-96 grams protein/day (1.2-1.5 grams protein per kg)  Fluid: ~1 ml/kcal or per MD discretion    NUTRITION DIAGNOSIS/PROBLEM:  Inadequate energy intake related to insufficient appetite resulting in inadequate nutrition intake as evidenced by documented/reported insufficient oral intake, loss of fat mass, and loss of muscle mass      MONITOR AND EVALUATE/NUTRITION GOALS:  PO intake of 75% of meals TID - New  Achieve and maintain dry wt +/- 1 to 2 lbs - New      MEDICATIONS:  Lactulose, Mg ox, Kcl 40 mEq    LABS:  Glu 107, Na 134, K 3.1    Pt is at Moderate nutrition risk    Waqas Mensah  Dietetic Intern  [id number]

## 2024-09-13 NOTE — CONSULTS
[facility]   part of [facility]  Palliative Care Initial Consult Note    Gayla Howe Patient Status:  Inpatient    1940 MRN [id number]   Location [facility] 5NW-A Attending Vito Rodriguez MD   Hosp Day # 1 PCP Mina Correa MD     Date of Consult: 2024  Patient seen at: [facility] Inpatient    Reason for Consultation:  Dr. Alcantar requested a consult for goals of care.       Subjective     History of Present Illness: Gayla Howe is a 84 year old female with a history of cryptogenic cirrhosis, ascites, macrocytic anemia, hypertension  who was admitted on 2024 for weakness and poor appetite. She was recently hospitalized in August for pneumonia, diagnosed with cirrhosis s/p paracentesis.  Work up in our hospital revealed encephalopathy, AMIRA, hypoalbuminemia, elevated proBNP  and hyponatremia.       History was obtained from Epic and her family.   Today is day #1 of hospitalization.   This is the 2nd hospitalization in the past 6 months.    When I entered the room, the patient was awake with her son and  at the bedside.   Gayla was able to engage in conversation related to her social history. She was not able to speak about details to her health other then she has not felt well.       Review of Systems:   Dyspnea: denies   Cough: her throat hurts  Nausea: denies  Appetite: poor. Her poor appetite became very noticeable in August prior to her admission.        Bowel Movement         2024  0600             Stool Count Calculated for I/O: 1          Wt Readings from Last 6 Encounters:   24 141 lb 4.8 oz (64.1 kg)   08/15/24 135 lb (61.2 kg)   24 135 lb (61.2 kg)   05/10/22 145 lb (65.8 kg)   21 145 lb (65.8 kg)   21 145 lb (65.8 kg)        Palliative Care Social History:   Marital Status:   Children: Yes son/ Wilfredo and daughter/ Hugo  Living Situation Prior to Admit: Home with family   Is Patient Confused: Yes  Occupational History:  teacher    Substance History:   reports that she has never smoked. She has never used smokeless tobacco.  reports that she does not currently use alcohol.  reports no history of drug use.      Spiritual Assessment:   No Protestant Indicated/Given      Past Medical History/Past Surgical History:       Medical History: obtained from [facility]  Past Medical History:    Cirrhosis (HCC)    Essential hypertension    High blood pressure    Hoarseness, chronic    Leg swelling    Loss of appetite    Wears glasses     History reviewed. No pertinent surgical history.    Family History: obtained from [facility]  Family History   Problem Relation Age of Onset    Other (Dyslexia) Mother     Heart Disorder Father        Allergies:  Allergies   Allergen Reactions    Codeine NAUSEA ONLY       Medications:     Current Facility-Administered Medications:     lactulose (ENULOSE) solution 20 g, 20 g, Oral, TID    ceFAZolin (Ancef) 1 g in dextrose 5% 100mL IVPB-ADD, 1 g, Intravenous, Q12H    sodium chloride 0.9% infusion, , Intravenous, Continuous    acetaminophen (Tylenol Extra Strength) tab 500 mg, 500 mg, Oral, Q4H PRN    melatonin tab 3 mg, 3 mg, Oral, Nightly PRN    polyethylene glycol (PEG 3350) (Miralax) 17 g oral packet 17 g, 17 g, Oral, Daily PRN    sennosides (Senokot) tab 17.2 mg, 17.2 mg, Oral, Nightly PRN    bisacodyl (Dulcolax) 10 MG rectal suppository 10 mg, 10 mg, Rectal, Daily PRN    ondansetron (Zofran) 4 MG/2ML injection 4 mg, 4 mg, Intravenous, Q6H PRN    metoclopramide (Reglan) 5 mg/mL injection 5 mg, 5 mg, Intravenous, Q8H PRN    heparin (Porcine) 5000 UNIT/ML injection 5,000 Units, 5,000 Units, Subcutaneous, 2 times per day    Functional Status History:  ADLs: bathing or showering, dressing, getting in and out of bed or a chair, walking, using the toilet, and eating  - HIGH  5+ performance deficits   IADLs: use the phone, shop for groceries, meal preparation, manage medicines, clean living area, use transportation by self,  manage money  - HIGH  5+ performance deficits   DME: Walker      Palliative Performance Scale:     (pt/family reported) 40%    Current 35%  % Ambulation Activity Level Self-Care Intake Consciousness   100 Full  Normal  No Disease Full Normal Full   90 Full  Normal  Some Disease Full Normal Full   80 Full  Normal w/effort  Some Disease Full Normal or reduced Full   70 Reduced  Can't Perform Job  Some Disease Full Normal or reduced Full   60 Reduced  Can't Perform Hobby   Significant Disease Occ Assist Normal or reduced Full or confused   50 Mainly sit/lie Can't do any work  Extensive Disease Partial Assist Normal or reduced Full or confused   40 Mainly in bed Can't do any work  Extensive Disease Mainly Assist Normal or reduced Full or confused   30 Bed Bound Can't do any work  Extensive Disease Max Assist  Total Care Reduced  Drowsy/confused   20 Bed Bound Can't do any work  Extensive Disease Max Assist  Total Care Minimal  Drowsy/confused   10 Bed Bound Can't do any work  Extensive Disease Max Assist  Total Care Mouth Care  Drowsy/confused   0 Death        Objective      Vital Signs:  Blood pressure 99/56, pulse 73, temperature 97.2 °F (36.2 °C), temperature source Oral, resp. rate 18, height 5' 2\" (1.575 m), weight 141 lb 4.8 oz (64.1 kg), SpO2 95%.  Body mass index is 25.84 kg/m².      Physical Exam:  General: Alert & Awake. In no apparent respiratory distress. Body habitus BMI WNL   HEENT: + gross focal deficits .    Cardiac: regular rate  Lungs: diminished anterior  Abdomen: Softly distended.   Extremities: + BLE edema present  Neurologic: Alert and oriented to person, place, time.  Psychiatric: Mood pleasant   Skin: Warm and dry. Ecchymosis on extremities.     Hematology:  Lab Results   Component Value Date    WBC 7.9 09/13/2024    HGB 9.2 (L) 09/13/2024    HCT 26.7 (L) 09/13/2024    .0 (L) 09/13/2024       Coags:  Lab Results   Component Value Date    INR 1.71 (H) 09/12/2024       Chemistry:  Lab  Results   Component Value Date    CREATSERUM 1.88 (H) 09/13/2024    BUN 21 09/13/2024     (L) 09/13/2024    K 3.1 (L) 09/13/2024     09/13/2024    CO2 19.0 (L) 09/13/2024     (H) 09/13/2024    CA 9.3 09/13/2024    ALB 2.0 (L) 09/13/2024    ALKPHO 116 09/13/2024    BILT 3.5 (H) 09/13/2024    TP 6.0 09/13/2024    AST 31 09/13/2024    ALT 14 09/13/2024    MG 1.6 09/13/2024       Imaging:  XR CHEST AP PORTABLE  (CPT=71045)    Result Date: 9/12/2024  CONCLUSION:    Sizable hiatal hernia.  Small left pleural effusion and left lower lobe retrocardiac consolidation, possible pneumonia.  Right lung grossly clear with blunting right costophrenic angle minimal.  Heart mostly obscured.  No sign of pneumothorax or CHF. Consider upright PA and lateral examination of the chest, when tolerated.   LOCATION:  [facility]      Dictated by (CST): Daniel Salgado MD on 9/12/2024 at 5:49 PM     Finalized by (CST): Daniel Salgado MD on 9/12/2024 at 5:50 PM        Summary of Discussion      I discussed reason for palliative care consultation with Gayla Lambert and her son Wilfredo.    I differentiated the palliative treatment-focus model versus the hospice comfort-focused philosophy of care. I informed the patient/family that having palliative care support does not limit medical treatment options or decisions to those who wish to continue curative or restorative medical therapies. I discussed the benefits of palliative care to include assistance with arising symptom management needs, an extra layer of support, to ensure GOC are respected throughout healthcare continuum, and assist with transition to hospice care when appropriate.      Outpatient/Community Palliative Care Services:  Usually visit once per 4 weeks  Focus on GOC and symptom management   Palliative Care criteria:  Not altered by prognosis   Does not limit curative or restorative therapies      Outpatient Hospice services:  24/7 phone triage services   RN visit one or  more times per week depending on need  Home health aid to assist in ADLs/hygiene   Hospice criteria:  Less than six-month prognosis   Must forego most life-prolonging  measures/treatments   Focus solely on comfort   Must sign onto hospice benefit with agency         Prognostic awareness/understanding: Excellent from the  and son     We discussed the disease trajectory of  cirrhosis with associated symptoms.    We discussed her symptoms are advanced as evidenced by her physical condition, confusion, blood work, given the timing of the cirrhosis diagnosis. Gayla herself since August is only getting off the couch to use the bathroom. She was hesitant to take the medications / diuretics due to the urgency and frequency of bathroom visits. Her confusion has also increased since August, we discussed acute illness/ infection vs encephalopathy from liver disease.   When talking to Gayla she waxes and wannes when talking about medication compliance. We discussed IV Albumin- it is a temporary fix to her low albumin, nutrition needs to improve long term to improve her albumin.   We discussed it does not appear at this time Gayla has executive cognitive functioning to make medical decisions for herself as she is not answering complex questions. She looks to Fausto to answer her questions. We discussed when making decisions for Gayla, keeping what Gayla considers quality of life at the center of the decision making process. We also discussed her body will make decisions going forward, if it can respond to treatment, if she can maintain her current activity level which is low and her willingness to eat.      Going forward the plan is to give her the opportunity to manage her symptoms medically.   We discussed if she develops of a pattern of back and forth to the hospital her baseline functioning will continue to decline as well it is a poor prognosis indicator.   The family understands if she declines during this  hospitalization they can accept escalation of treatment or transition to hospice.   Fausto is doing his best to care for her at home, he is recommending activity, nutrition and medications, Gayla has been declining the recommendations.     Fears/concerns:   Gayla stated her fears- \" I know things are going to change\" She was unable to elaborate on the statement.   Provided emotional support to Fausto and Wilfredo.    Advance Care Planning counseling and discussion: DNAR selective treatment.   We reviewed the POLST, POLST completed.   DNR ( do not resuscitate) indicates if the heart stops and no spontaneous breaths end of life is present, \" allow natural death.\"    DNAR with Selective treatment is appropriate for on going medical management and treatment for new potential symptoms or complications with the exception of CPR AND INTUBATION.  DNI ( do not intubate) indicates no breathing tube will be placed for respiratory distress or if no spontaneous breaths.       POA - Fausto Howe, [phone]  POLST FORM Completed--Document signed and will be scanned  DNAR/Selective Treatment    Principal Problem:    Hepatic encephalopathy (HCC)  Active Problems:    Pneumonia due to infectious organism, unspecified laterality, unspecified part of lung    Palliative Care encounter    Assessment and Recommendations      Goals of care:   Community palliative care at discharge.   Continue treatment focused medical management. The family has good insight into her serious medical condition and the role of medication compliance and nutrition.   Code Status: DNAR/Selective Treatment        Discussed today's visit with Gail CHAVEZ and Rossana RN.     Palliative Care Follow Up: Palliative care team will Continue to follow while inpatient.  Palliative care follow up outpatient: is indicated and community palliative care ordered.    Thank you for allowing Palliative Care services to participate in the care of Gayla Howe.    A total of 75 minutes  were spent on this consult, which included all of the following: chart review, direct face to face contact, history taking, physical examination, advanced care planning,  counseling and documentation.     TRI Green  9/13/2024  8:53 AM  Palliative Care Services    The 21st Century Cures Act makes medical notes like these available to patients in the interest of transparency. Please be advised this is a medical document. Medical documents are intended to carry relevant information, facts as evident, and the clinical opinion of the practitioner. The medical note is intended as peer to peer communication and may appear blunt or direct. It is written in medical language and may contain abbreviations or verbiage that are unfamiliar.

## 2024-09-13 NOTE — PAYOR COMM NOTE
--------------  ADMISSION REVIEW     Payor: [employer]  Subscriber #:  [id number]  Authorization Number: [id number]    Admit date: 9/12/24  Admit time:  8:46 PM       History   HPI  This is a 84-year-old female with history of alcoholic cirrhosis, hypertension, presents emergency room with family for evaluation of generalized weakness, increasing lower extremity and abdominal swelling.  Family states patient has been sleeping more than usual recently, has had decreased appetite/oral intake.  Patient also has been coughing.  Denies chest pain or shortness of breath.  Denies any abdominal pain.  Denies any fevers or chills.  Patient is supposed to be taking Lasix and spironolactone but is not taking per family.  Patient also has had episodes of confusion but currently her normal mental status per family.  There is been no reports of melena or hematochezia.  Denies urinary symptoms.  Denies back or flank pain.  Denies any focal weakness.  Patient denies headache or neck pain or visual change.  ED Triage Vitals [09/12/24 1634]   BP (!) 83/57   Pulse 92   Resp 18   Temp 97.2 °F (36.2 °C)   Temp src Temporal   SpO2 98 %   O2 Device None (Room air)   HEENT: Pupils equal round reactive to light, extraocular muscles are intact, there is mild scleral icterus.  Mucous membranes are slightly dry, oropharynx is clear.  Scalp is atraumatic.  NECK: Neck is supple, there is no nuchal rigidity.    HEART: Regular rate and rhythm, no murmurs.  LUNGS: Coarse breath sounds bilaterally.  No Rales, no rhonchi, no wheezing, no stridor.  ABDOMEN: Soft, nondistended,non tender, bowel sounds are present, no rebound, no rigidity, no guarding.no pulsatile masses. No CVA tenderness  EXTREMITIES: Bilateral lower extremity edema, no calf tenderness  NEUROLOGIC EXAM: Tongue midline, no facial drooping, no ptosis, muscle strength +5/5 bilateral upper and lower extremities cerebellar finger to nose intact, no pronator drift, sensation  intact.  Labs Reviewed   COMP METABOLIC PANEL (14) - Abnormal; Notable for the following components:       Result Value    Glucose 105 (*)     Sodium 133 (*)     CO2 19.0 (*)     BUN 25 (*)     Creatinine 1.99 (*)     eGFR-Cr 24 (*)     AST 71 (*)     Bilirubin, Total 4.2 (*)     Albumin 2.5 (*)     Globulin  4.9 (*)     A/G Ratio 0.5 (*)     All other components within normal limits   LACTIC ACID, PLASMA - Abnormal; Notable for the following components:    Lactic Acid 3.8 (*)     All other components within normal limits   CBC WITH DIFFERENTIAL WITH PLATELET - Abnormal; Notable for the following components:    RBC 3.34 (*)     HGB 11.2 (*)     HCT 31.4 (*)     All other components within normal limits   URINALYSIS WITH CULTURE REFLEX - Abnormal; Notable for the following components:    Clarity Urine Turbid (*)     Protein Urine Trace (*)     Urobilinogen Urine 3 (*)     WBC Urine 11-20 (*)     RBC Urine 3-5 (*)     Bacteria Urine Rare (*)     Squamous Epi. Cells Few (*)     Hyaline Casts Present (*)     All other components within normal limits   AMMONIA, PLASMA - Abnormal; Notable for the following components:    Ammonia 132 (*)     All other components within normal limits   PRO BETA NATRIURETIC PEPTIDE - Abnormal; Notable for the following components:    Pro-Beta Natriuretic Peptide 1,571 (*)     All other components within normal limits   PROTHROMBIN TIME (PT) - Abnormal; Notable for the following components:    PT 20.3 (*)     INR 1.71 (*)     All other components within normal limits   LACTIC ACID REFLEX POST POSTIVE - Abnormal; Notable for the following components:    Lactic Acid 2.6 (*)     All other components within normal limits   PROCALCITONIN - Abnormal; Notable for the following components:    Procalcitonin 0.43 (*)     All other components within normal limits   ICTOTEST - Abnormal; Notable for the following components:    Ictotest Positive (*)     All other components within normal limits   LACTIC  ACID REFLEX POST POSITIVE [id number] - Abnormal; Notable for the following components:    Lactic Acid 2.6 (*)     All other components within normal limits   COMP METABOLIC PANEL (14) - Abnormal; Notable for the following components:    Glucose 107 (*)     Sodium 134 (*)     Potassium 3.1 (*)     CO2 19.0 (*)     Creatinine 1.88 (*)     eGFR-Cr 26 (*)     Bilirubin, Total 3.5 (*)     Albumin 2.0 (*)     Globulin  4.0 (*)     A/G Ratio 0.5 (*)     All other components within normal limits   CBC WITH DIFFERENTIAL WITH PLATELET - Abnormal; Notable for the following components:    RBC 2.81 (*)     HGB 9.2 (*)     HCT 26.7 (*)     .0 (*)    EKG    Rate, intervals and axes as noted on EKG Report.  Rate: 96  Rhythm: Sinus Rhythm  Reading: Normal sinus rhythm, no ST elevation  Admission disposition: 9/12/2024  7:06 PM     Disposition and Plan     Clinical Impression:  1. Hepatic encephalopathy (HCC)    2. Pneumonia due to infectious organism, unspecified laterality, unspecified part of lung       Disposition:  Admit  9/12/2024  7:06 pm      History and Physical   History of Present Illness:    Gayla Howe is a 84 year old female with PMHx cirrhosis, CKD3, macrocytic anemia, recent hospitalization last month 8/9-8/7 for acute resp failure 2/2 pna presents today w/ family at the bedside for concerns patient is getting more weak and having intermittent confusion.  Patient lives at home with her .  Family admits patient does not take any of her Lasix/spironolactone.  Patient has poor appetite.  Patient has reportedly been having intermittent confusion.  Patient does have cough residually from pneumonia last month.  Overall patient is feeling better from a respiratory/cough standpoint.     -S/p lactulose, IV Zosyn, ivf gtt, GI consult    /69   Pulse 91   Temp 97.2 °F (36.2 °C) (Temporal)   Resp 17   Ht 5' 2\" (1.575 m)   Wt 135 lb (61.2 kg)   SpO2 97%   BMI 24.69 kg/m²     General: Alert  Respiratory:  No rhonchi, no wheezes  Cardiovascular: S1, S2. Regular rate and rhythm  Abdomen: Soft, Non-tender, non-distended, positive bowel sounds  Neuro: No new focal deficits  Extremities: 1+ Le edema     Lab 09/12/24  1713   RBC 3.34*   HGB 11.2*   HCT 31.4*   MCV 94.0   MCH 33.5   MCHC 35.7   RDW 14.7   NEPRELIM 6.35   WBC 8.7   .0      Lab 09/12/24  1713   *   BUN 25*   CREATSERUM 1.99*   EGFRCR 24*   CA 9.8   ALB 2.5*   *   K 4.1      CO2 19.0*   ALKPHO 141   AST 71*   ALT 17   BILT 4.2*   TP 7.4     Lab 09/12/24  1713   TROPHS 27      Lab 09/12/24  1713   PBNP 1,571*      Lab 09/12/24  1713   PCT 0.43*      Assessment & Plan:    #Hepatic encephalopathy  -Lactulose     #Debility  -2/2 above  -therapies to see     #Alcohol-induced cirrhosis  #Ascites  -PTA: Spironolactone/Lasix (family reports non compliance)  -recurrent hospitalizations, palliative care to see     #Hyponatremia  #NAGMA  #CKD3  -gentle iv hydration overnight     #Abnormal cxr  -s/p iv abx in ER, pt has cough but improving, pt has known left lobe opacity from pna in aug, for now will hold further pna abx      #Macrocytic anemia    #Pyuria  -unclear if pt having symptoms  -Empiric Ancef for now  -fu UCx     #Recent hospitalization  -8/3-8/7 where patient was admitted for acute respiratory failure secondary to pneumonia.        MEDICATIONS ADMINISTERED IN LAST 1 DAY:  albumin human (Albumin) 5% injection 12.5 g       Date Action Dose Route User    9/13/2024 1223 New Bag 12.5 g Intravenous Rossana Martinez RN          ceFAZolin (Ancef) 1 g in dextrose 5% 100mL IVPB-ADD       Date Action Dose Route User    9/13/2024 0812 New Bag 1 g Intravenous Rossana Martinez RN    9/12/2024 2203 New Bag 1 g Intravenous Shannan Johnson RN          heparin (Porcine) 5000 UNIT/ML injection 5,000 Units       Date Action Dose Route User    9/13/2024 0812 Given 5,000 Units Subcutaneous (Right Lower Abdomen) Rossana Martinez, RN    9/12/2024 9524  Given 5,000 Units Subcutaneous (Bilateral Lower Abdomen) Shannan Johnson RN          lactulose (ENULOSE) solution 20 g       Date Action Dose Route User    9/12/2024 1929 Given 20 g Oral Yin Reagan RN          lactulose (ENULOSE) solution 20 g       Date Action Dose Route User    9/13/2024 0812 Given 20 g Oral Rossana Martinez RN          magnesium oxide (Mag-Ox) tab 400 mg       Date Action Dose Route User    9/13/2024 0812 Given 400 mg Oral Rossana Martinez RN          piperacillin-tazobactam (Zosyn) 4.5 g in dextrose 5% 100 mL IVPB-ADDV       Date Action Dose Route User    9/12/2024 1911 New Bag 4.5 g Intravenous Ivet Ortiz RN          potassium chloride (Klor-Con M20) tab 40 mEq       Date Action Dose Route User    9/13/2024 0812 Given 40 mEq Oral Rossana Martinez RN          sodium chloride 0.9% infusion       Date Action Dose Route User    9/12/2024 1911 New Bag 125 mL/hr Intravenous Ivet Ortiz RN          sodium chloride 0.9% infusion       Date Action Dose Route User    9/12/2024 2205 New Bag (none) Intravenous Shannan Johnson RN            Vitals (last day)       Date/Time Temp Pulse Resp BP SpO2 Weight O2 Device O2 Flow Rate (L/min) [facility]    09/13/24 1157 -- 73 -- 91/50 98 % -- None (Room air) -- SG    09/13/24 1123 97.3 °F (36.3 °C) 78 18 88/58 97 % -- None (Room air) -- ND    09/13/24 0757 97.2 °F (36.2 °C) 73 18 99/56 95 % -- None (Room air) -- ND    09/13/24 0600 97.4 °F (36.3 °C) 74 18 95/49 93 % -- None (Room air) -- AN    09/13/24 0255 97.2 °F (36.2 °C) 77 18 110/59 92 % -- None (Room air) -- MARCELLE    09/12/24 2108 97.5 °F (36.4 °C) 84 18 95/55 93 % 141 lb 4.8 oz (64.1 kg) None (Room air) -- AN    09/12/24 1634 97.2 °F (36.2 °C) 92 18 83/57 98 % 135 lb (61.2 kg) None (Room air) -- TO

## 2024-09-13 NOTE — ED PROVIDER NOTES
Patient Seen in: [facility] 5nw-a      History     Chief Complaint   Patient presents with    Fatigue     Stated Complaint: weakness    Subjective:   HPI    This is a 84-year-old female with history of alcoholic cirrhosis, hypertension, presents emergency room with family for evaluation of generalized weakness, increasing lower extremity and abdominal swelling.  Family states patient has been sleeping more than usual recently, has had decreased appetite/oral intake.  Patient also has been coughing.  Denies chest pain or shortness of breath.  Denies any abdominal pain.  Denies any fevers or chills.  Patient is supposed to be taking Lasix and spironolactone but is not taking per family.  Patient also has had episodes of confusion but currently her normal mental status per family.  There is been no reports of melena or hematochezia.  Denies urinary symptoms.  Denies back or flank pain.  Denies any focal weakness.  Patient denies headache or neck pain or visual change.    Objective:   Past Medical History:    Cirrhosis (HCC)    Essential hypertension    High blood pressure    Hoarseness, chronic    Leg swelling    Loss of appetite    Wears glasses              History reviewed. No pertinent surgical history.             Social History     Socioeconomic History    Marital status:    Occupational History    Occupation: Teacher     Comment: elementary   Tobacco Use    Smoking status: Never    Smokeless tobacco: Never   Vaping Use    Vaping status: Never Used   Substance and Sexual Activity    Alcohol use: Not Currently     Comment: social    Drug use: Never   Other Topics Concern    Caffeine Concern No    Exercise No    Seat Belt Yes    Special Diet No    Stress Concern Yes    Weight Concern No     Social Determinants of Health     Food Insecurity: No Food Insecurity (9/12/2024)    Food Insecurity     Food Insecurity: Never true   Transportation Needs: No Transportation Needs (9/12/2024)    Transportation  Needs     Lack of Transportation: No   Housing Stability: Low Risk  (9/12/2024)    Housing Stability     Housing Instability: No              Review of Systems    Positive for stated Chief Complaint: Fatigue    Other systems are as noted in HPI.  Constitutional and vital signs reviewed.      All other systems reviewed and negative except as noted above.    Physical Exam     ED Triage Vitals [09/12/24 1634]   BP (!) 83/57   Pulse 92   Resp 18   Temp 97.2 °F (36.2 °C)   Temp src Temporal   SpO2 98 %   O2 Device None (Room air)       Current Vitals:   Vital Signs  BP: (!) 88/58  Pulse: 78  Resp: 18  Temp: 97.3 °F (36.3 °C)  Temp src: Oral  MAP (mmHg): 65    Oxygen Therapy  SpO2: 97 %  O2 Device: None (Room air)  Pulse Oximetry Type: Continuous  Oximetry Probe Site Changed: No  Pulse Ox Probe Location: Left hand            Physical Exam    GENERAL: Patient is awake, alert  HEENT: Pupils equal round reactive to light, extraocular muscles are intact, there is mild scleral icterus.  Mucous membranes are slightly dry, oropharynx is clear.  Scalp is atraumatic.  NECK: Neck is supple, there is no nuchal rigidity.    HEART: Regular rate and rhythm, no murmurs.  LUNGS: Coarse breath sounds bilaterally.  No Rales, no rhonchi, no wheezing, no stridor.  ABDOMEN: Soft, nondistended,non tender, bowel sounds are present, no rebound, no rigidity, no guarding.no pulsatile masses. No CVA tenderness  EXTREMITIES: Bilateral lower extremity edema, no calf tenderness  NEUROLOGIC EXAM: Tongue midline, no facial drooping, no ptosis, muscle strength +5/5 bilateral upper and lower extremities cerebellar finger to nose intact, no pronator drift, sensation intact.        ED Course     Labs Reviewed   COMP METABOLIC PANEL (14) - Abnormal; Notable for the following components:       Result Value    Glucose 105 (*)     Sodium 133 (*)     CO2 19.0 (*)     BUN 25 (*)     Creatinine 1.99 (*)     eGFR-Cr 24 (*)     AST 71 (*)     Bilirubin, Total 4.2  (*)     Albumin 2.5 (*)     Globulin  4.9 (*)     A/G Ratio 0.5 (*)     All other components within normal limits   LACTIC ACID, PLASMA - Abnormal; Notable for the following components:    Lactic Acid 3.8 (*)     All other components within normal limits   CBC WITH DIFFERENTIAL WITH PLATELET - Abnormal; Notable for the following components:    RBC 3.34 (*)     HGB 11.2 (*)     HCT 31.4 (*)     All other components within normal limits   URINALYSIS WITH CULTURE REFLEX - Abnormal; Notable for the following components:    Clarity Urine Turbid (*)     Protein Urine Trace (*)     Urobilinogen Urine 3 (*)     WBC Urine 11-20 (*)     RBC Urine 3-5 (*)     Bacteria Urine Rare (*)     Squamous Epi. Cells Few (*)     Hyaline Casts Present (*)     All other components within normal limits   AMMONIA, PLASMA - Abnormal; Notable for the following components:    Ammonia 132 (*)     All other components within normal limits   PRO BETA NATRIURETIC PEPTIDE - Abnormal; Notable for the following components:    Pro-Beta Natriuretic Peptide 1,571 (*)     All other components within normal limits   PROTHROMBIN TIME (PT) - Abnormal; Notable for the following components:    PT 20.3 (*)     INR 1.71 (*)     All other components within normal limits   LACTIC ACID REFLEX POST POSTIVE - Abnormal; Notable for the following components:    Lactic Acid 2.6 (*)     All other components within normal limits   PROCALCITONIN - Abnormal; Notable for the following components:    Procalcitonin 0.43 (*)     All other components within normal limits   ICTOTEST - Abnormal; Notable for the following components:    Ictotest Positive (*)     All other components within normal limits   LACTIC ACID REFLEX POST POSITIVE [id number] - Abnormal; Notable for the following components:    Lactic Acid 2.6 (*)     All other components within normal limits   COMP METABOLIC PANEL (14) - Abnormal; Notable for the following components:    Glucose 107 (*)     Sodium 134 (*)      Potassium 3.1 (*)     CO2 19.0 (*)     Creatinine 1.88 (*)     eGFR-Cr 26 (*)     Bilirubin, Total 3.5 (*)     Albumin 2.0 (*)     Globulin  4.0 (*)     A/G Ratio 0.5 (*)     All other components within normal limits   CBC WITH DIFFERENTIAL WITH PLATELET - Abnormal; Notable for the following components:    RBC 2.81 (*)     HGB 9.2 (*)     HCT 26.7 (*)     .0 (*)     All other components within normal limits   TROPONIN I HIGH SENSITIVITY - Normal   MAGNESIUM - Normal   LACTIC ACID, PLASMA - Normal   RAPID SARS-COV-2 BY PCR - Normal   BLOOD CULTURE   BLOOD CULTURE   URINE CULTURE, ROUTINE     EKG    Rate, intervals and axes as noted on EKG Report.  Rate: 96  Rhythm: Sinus Rhythm  Reading: Normal sinus rhythm, no ST elevation                          MDM        Differential diagnosis before testing includes but not limited to hepatic encephalopathy, pneumonia, electrolyte abnormality, urinary tract infection, which is a medical condition that poses a threat to life/function    Past Medical History/comorbidities-history also per family at bedside as in HPI    Radiographic images  I personally reviewed the radiographs and my individual interpretation shows chest x-ray no pneumothorax.  I also reviewed the official reports that showed hiatal hernia, left pleural effusion, left lower lobe retrocardiac infiltrate/pneumonia.    Discussion of management (consult/physicians, social work, pharmacy,ect) GI Dr. Keating, [facility]       Course of Events during Emergency Room Visit include IV established, patient placed on cardiac monitor and pulse ox.  CBC white count 8.7 hemoglobin 11.2 platelet 191.  Ammonia elevated at 132.  Lactic acid 2.8.  Negative COVID testing.  INR elevated 1.21.  BNP 1571.  Chemistry sodium 133 potassium 4.1 BUN 25 creatinine 1.99 glucose 105.  Blood cultures performed, patient was given dose of IV antibiotics for pneumonia.  Discussed with GI and hospitalist, patient will be given  gentle hydration, does have elevated lactic acid but clinically is not septic therefore was not given full sepsis bolus.  Discussed all results with patient and family, patient be admitted for further evaluation and treatment.    Shared decision making was utilized           Disposition:    Admission  I have discussed with the patient the results of test, differential diagnosis, and treatment plan. They expressed clear understanding of these instructions and agrees to the plan provided.       Note to patient: The 21st Century Cures Act makes medical notes like these available to patients in the interest of transparency. However, this is a medical document intended as peer to peer communication. It is written in medical language and may contain abbreviations or verbiage that are unfamiliar. It may appear blunt or direct. Medical documents are intended to carry relevant information, facts as evident, and the clinical opinion of the practitioner.           Admission disposition: 9/12/2024  7:06 PM                                        Medical Decision Making      Disposition and Plan     Clinical Impression:  1. Hepatic encephalopathy (HCC)    2. Pneumonia due to infectious organism, unspecified laterality, unspecified part of lung         Disposition:  Admit  9/12/2024  7:06 pm    Follow-up:  No follow-up provider specified.        Medications Prescribed:  Current Discharge Medication List                            Hospital Problems       Present on Admission  Date Reviewed: 8/15/2024            ICD-10-CM Noted POA    * (Principal) Hepatic encephalopathy (HCC) K76.82 9/12/2024 Unknown    Pneumonia due to infectious organism, unspecified laterality, unspecified part of lung J18.9 9/12/2024 Unknown

## 2024-09-13 NOTE — ED QUICK NOTES
Orders for admission, patient is aware of plan and ready to go upstairs. Any questions, please call ED SCOTT NIETO at extension [phone].     Patient Covid vaccination status: Fully vaccinated     COVID Test Ordered in ED: Rapid SARS-CoV-2 by PCR    COVID Suspicion at Admission: N/A    Running Infusions:    sodium chloride 125 mL/hr (09/12/24 1911)        Mental Status/LOC at time of transport: A&OX3    Other pertinent information:   CIWA score: N/A   NIH score:  N/A

## 2024-09-13 NOTE — CM/SW NOTE
Anticipated therapy need: Home with Home Healthcare  HENRIK requested department  (DSC) to initiate [facility]ID[facility] referral for HHC.    SW/CM to remain available for support and/or discharge planning.    Addendum 3:18pm: HENRIK acknowledged order for Community Palliative Care. HENRIK requested department  (DSC) to initiate AIDIN referral for Palliative . SW/CM will continue to follow.         LUPE Lowery  Discharge Planner  [phone]

## 2024-09-13 NOTE — CM/SW NOTE
Department  notified of request for HHC and Palliative , aidin referrals started. Assigned CM/SW to follow up with pt/family on further discharge planning.     Tonie Lomeli, DSC

## 2024-09-13 NOTE — PROGRESS NOTES
NURSING ADMISSION NOTE      Patient admitted via Cart  Oriented to room 501.  Safety precautions initiated.  Bed in low position.  Call light in reach.    Pt aox 3-4,  Family at bedside, Admission navigator completed. VSS. Safety precaution in placed. Pt and family updated on plan of care.

## 2024-09-13 NOTE — PROGRESS NOTES
[facility]   part of [facility]     Hospitalist Progress Note     Gayla Howe Patient Status:  Inpatient    1940 MRN [id number]   Location [facility] 5NW-A Attending Vito Rodriguez MD   Hosp Day # 1 PCP Mina Correa MD     Chief Complaint: AMS    Subjective:     Patient less confused, back to baseline per . No diarrhea. Complains abd distended    Objective:    Review of Systems:   A comprehensive review of systems was completed; pertinent positive and negatives stated in subjective.    Vital signs:  Temp:  [97.2 °F (36.2 °C)-97.5 °F (36.4 °C)] 97.4 °F (36.3 °C)  Pulse:  [73-91] 79  Resp:  [12-20] 18  BP: ()/(48-69) 101/48  SpO2:  [92 %-99 %] 97 %    Physical Exam:    General: No acute distress  Respiratory: No wheezes, no rhonchi  Cardiovascular: S1, S2, regular rate and rhythm  Abdomen: Soft, Non-tender, distended, positive bowel sounds  Neuro: No new focal deficits.   Extremities: No edema      Diagnostic Data:    Labs:  Recent Labs   Lab 24  0439   WBC 8.7 7.9   HGB 11.2* 9.2*   MCV 94.0 95.0   .0 136.0*   INR 1.71*  --        Recent Labs   Lab 24  0438   * 107*   BUN 25* 21   CREATSERUM 1.99* 1.88*   CA 9.8 9.3   ALB 2.5* 2.0*   * 134*   K 4.1 3.1*    104   CO2 19.0* 19.0*   ALKPHO 141 116   AST 71* 31   ALT 17 14   BILT 4.2* 3.5*   TP 7.4 6.0       Estimated Creatinine Clearance: 17.9 mL/min (A) (based on SCr of 1.88 mg/dL (H)).    Recent Labs   Lab 24  1713   TROPHS 27       Recent Labs   Lab 24  1713   PTP 20.3*   INR 1.71*                  Microbiology    No results found for this visit on 24.      Imaging: Reviewed in Epic.    Medications:    lactulose  20 g Oral TID    ceFAZolin  1 g Intravenous Q12H    heparin  5,000 Units Subcutaneous 2 times per day       Assessment & Plan:      #Hepatic encephalopathy  -Lactulose     #Debility  -2/2 above  -therapies to see     #Alcohol-induced  cirrhosis  #Ascites  -PTA: Spironolactone/Lasix (family reports non compliance)  -recurrent hospitalizations, palliative care to see  -US para for ascities     #Hyponatremia  #NAGMA  #CKD3  -dc iv hydration      #Abnormal cxr  -s/p iv abx in ER, pt has cough but improving, pt has known left lobe opacity from pna in aug, for now will hold further pna abx      #Macrocytic anemia    #Pyuria  -unclear if pt having symptoms  -Empiric Ancef for now  -fu UCx     #Recent hospitalization  -8/3-8/7 where patient was admitted for acute respiratory failure secondary to pneumonia.         Vito Rodriguez MD    Supplementary Documentation:     Quality:  DVT Mechanical Prophylaxis:   SCDs,    DVT Pharmacologic Prophylaxis   Medication    heparin (Porcine) 5000 UNIT/ML injection 5,000 Units                Code Status: DNAR/Selective Treatment  Street: External urinary catheter in place  Street Duration (in days):   Central line:    HEIKE:     Discharge is dependent on: course  At this point Ms. Howe is expected to be discharge to: home    The 21st Century Cures Act makes medical notes like these available to patients in the interest of transparency. Please be advised this is a medical document. Medical documents are intended to carry relevant information, facts as evident, and the clinical opinion of the practitioner. The medical note is intended as peer to peer communication and may appear blunt or direct. It is written in medical language and may contain abbreviations or verbiage that are unfamiliar.     Dietitian Malnutrition Assessment    Evaluation for Malnutrition: Criteria for non-severe malnutrition diagnosis-         acute illness/injury related to Energy intake less than 75% for greater than 7 days., Fluid accumulation mild.         RD Malnutrition Care Plan: See RD nutrition assessment for additional recommendations.    Body Fat/Muscle Mass:          Physician Assessment     Patient has a diagnosis of moderate  malnutrition          **Certification      PHYSICIAN Certification of Need for Inpatient Hospitalization - Initial Certification    Patient will require inpatient services that will reasonably be expected to span two midnight's based on the clinical documentation in H+P.   Based on patients current state of illness, I anticipate that, after discharge, patient will require TBD.

## 2024-09-13 NOTE — OCCUPATIONAL THERAPY NOTE
OCCUPATIONAL THERAPY EVALUATION - INPATIENT     Room Number: 501/501-A  Evaluation Date: 9/13/2024  Type of Evaluation: Initial  Presenting Problem: Hepatic encephalopathy    Physician Order: IP Consult to Occupational Therapy  Reason for Therapy: ADL/IADL Dysfunction and Discharge Planning    OCCUPATIONAL THERAPY ASSESSMENT   Patient is currently functioning near baseline with  ADLs and mobility . Prior to admission, patient's baseline is SV to MOD I for mobility/transfers short distances with RW, spouse assists with all Adls and needed.  Patient is requiring MIN A for mobility/transfers and MOD A for LB ADLs as a result of the following impairments: decreased functional strength, decreased functional reach, decreased endurance, impaired standing balance, and decreased muscular endurance. Occupational Therapy will continue to follow for duration of hospitalization.    Patient will benefit from continued skilled OT Services at discharge to promote prior level of function.  Anticipate patient will return home with home health OT      History Related to Current Admission: Patient is a 84 year old female admitted on 9/12/2024 from home for Hepatic encephalopathy.    Previous admission:  8/9-8/7/24 for acute resp failure 2/2 pna --> Dc'd home with [facility]    Co-Morbidities : cirrhotic liver    WEIGHT BEARING RESTRICTION  Weight Bearing Restriction: None                Recommendations for nursing staff:   Transfers: x1 with RW  Toileting location: commode     EVALUATION SESSION:  Patient Start of Session: semi-supine   FUNCTIONAL TRANSFER ASSESSMENT  Sit to Stand: Edge of Bed  Edge of Bed: Moderate Assist  Commode Transfer: Minimal Assist    BED MOBILITY  Supine to Sit : Minimal Assist  Scooting: SBA    BALANCE ASSESSMENT  Static Sitting: Stand-by Assist  Static Standing: Minimal Assist    FUNCTIONAL ADL ASSESSMENT  Eating: Stand-by Assist  Grooming Seated: Stand-by Assist  LB Dressing Seated: Maximum Assist (consistent with  baseline)      ACTIVITY TOLERANCE: Pt on room air and denies SOB, dizziness or lightheadedness throughout session. No significant change in vitals noted. Pt does present with decreased overall activity tolerance this session, becoming fatigued quickly with activity and benefits from rest breaks as needed.                          O2 SATURATIONS       COGNITION  Arousal/Alertness:  appropriate responses to stimuli  Orientation Level:  oriented x4  Following Commands:  follows one step commands consistently and follows multistep commands with increased time  Safety Judgement:  good awareness of safety precautions    Upper Extremity   ROM: within functional limits   Strength: overall 3+/5  Coordination  Gross motor: wfl  Fine motor: wfl    EDUCATION PROVIDED  Patient: Role of Occupational Therapy; Plan of Care; Functional Transfer Techniques; Fall Prevention; Proper Body Mechanics  Patient's Response to Education: Verbalized Understanding; Requires Further Education  Family/Caregiver: Role of Occupational Therapy; Plan of Care  Family/Caregiver's Response to Education: Verbalized Understanding    Equipment used: RW  Demonstrates functional use, Would benefit from additional trial      Therapist comments: Pt is pleasant and agreeable to therapy. Supportive spouse present. Encouraged Pt to be OOB to chair for all meals.     Patient End of Session: Up in chair;Needs met;Call light within reach;RN aware of session/findings;All patient questions and concerns addressed;Alarm set;Family present    OCCUPATIONAL PROFILE    HOME SITUATION  Type of Home: House  Home Layout: Two level  Lives With: Spouse          Other Equipment:  (RW)    Occupation/Status: retired     Drives: No       Prior Level of Function: Pt lives with her spouse and is typically able to perform mobility/transfers herself with RW. Spouse assists as needed with Adls. Spouse states Pt gets herself OOB and walks to couch in the morning and \"that is kind of her  exercise for the day.\" Pt has been working with  PT since last admit.     SUBJECTIVE   \"I can't walk to the door right now.\" Pt fatigued and requesting to only complete bed<>chair     PAIN ASSESSMENT  Ratin  Location: denies       OBJECTIVE  Precautions: Bed/chair alarm  Fall Risk: High fall risk      ASSESSMENTS    AM-PAC ‘6-Clicks’ Inpatient Daily Activity Short Form  -   Putting on and taking off regular lower body clothing?: A Lot  -   Bathing (including washing, rinsing, drying)?: A Little  -   Toileting, which includes using toilet, bedpan or urinal? : A Lot  -   Putting on and taking off regular upper body clothing?: A Little  -   Taking care of personal grooming such as brushing teeth?: None  -   Eating meals?: None    AM-PAC Score:  Score: 18  Approx Degree of Impairment: 46.65%  Standardized Score (AM-PAC Scale): 38.66    ADDITIONAL TESTS     NEUROLOGICAL FINDINGS      COGNITION ASSESSMENTS       PLAN  OT Treatment Plan: Balance activities;Energy conservation/work simplification techniques;ADL training;Functional transfer training;UE strengthening/ROM;Endurance training;Patient/Family education;Patient/Family training;Equipment eval/education;Compensatory technique education  Rehab Potential : Good  Frequency: 3-5x/week  Number of Visits to Meet Established Goals: 5    ADL Goals   Patient will perform upper body dressing:  with supervision  Patient will perform lower body dressing:  with min assist  Patient will perform toileting: with min assist    Functional Transfer Goals  Patient will transfer from sit to stand:  with stand by assist  Patient will transfer to bedside commode:  with stand by assist    UE Exercise Program Goal  Patient will be supervision with bilateral AROM HEP (home exercise program).    Additional Goals  Pt will tolerate sitting EOB x10 mins with SBA while completing functional ADL task    Patient Evaluation Complexity Level:   Occupational Profile/Medical History MODERATE -  Expanded review of history including review of medical or therapy record   Specific performance deficits impacting engagement in ADL/IADL MODERATE  3 - 5 performance deficits   Client Assessment/Performance Deficits MODERATE - Comorbidities and min to mod modifications of tasks    Clinical Decision Making MODERATE - Analysis of occupational profile, detailed assessments, several treatment options    Overall Complexity MODERATE     OT Session Time: 23 minutes  Therapeutic Activity: 10 minutes

## 2024-09-13 NOTE — PHYSICAL THERAPY NOTE
PHYSICAL THERAPY EVALUATION - INPATIENT     Room Number: 501/501-A  Evaluation Date: 9/13/2024  Type of Evaluation: Initial  Physician Order: PT Eval and Treat    Presenting Problem: hepatic encephalopathy  Co-Morbidities : cirrhotic liver  Reason for Therapy: Mobility Dysfunction and Discharge Planning    PHYSICAL THERAPY ASSESSMENT   Patient is currently functioning near baseline with bed mobility, transfers, and gait.  Prior to admission, patient's baseline is ambulatory with RW, family assisting with all mobility and ADLs.  Patient is requiring minimal assist and moderate assist as a result of the following impairments: decreased functional strength and impaired motor planning. Physical Therapy will continue to follow for duration of hospitalization.      Patient will benefit from continued skilled PT Services at discharge to promote prior level of function and safety with additional support and return home with home health PT.    PLAN  PT Treatment Plan: Bed mobility;Body mechanics;Endurance;Energy conservation;Patient education;Family education;Gait training;Strengthening;Stoop training;Stair training;Transfer training;Balance training  Rehab Potential : Fair  Frequency (Obs):  (2-3x/week)  Number of Visits to Meet Established Goals: 3      CURRENT GOALS    Goal #1 Patient is able to demonstrate supine - sit EOB @ level: supervision     Goal #2 Patient is able to demonstrate transfers Sit to/from Stand at assistance level: minimum assistance     Goal #3 Patient is able to ambulate 50 feet with assist device: walker - rolling at assistance level: minimum assistance     Goal #4    Goal #5    Goal #6    Goal Comments: Goals established on 9/13/2024      PHYSICAL THERAPY MEDICAL/SOCIAL HISTORY  History related to current admission: Patient is a 84 year old female admitted on 9/12/2024 from home for hepatic encephalopathy.      HOME SITUATION  Type of Home: House   Home Layout: Two level                Lives With:  Spouse     Patient Owned Equipment: Rolling walker       Prior Level of [place]: Pt and spouse report assist to get OOB, but once she is up can get around with RW.  Pt spends most of the day relaxing on the couch.    SUBJECTIVE  \"I'm not confident.\"        OBJECTIVE  Precautions: Bed/chair alarm  Fall Risk: High fall risk    WEIGHT BEARING RESTRICTION                   PAIN ASSESSMENT  Ratin          COGNITION  Following Commands:  follows one step commands with increased time  Motor Planning: impaired  Problem Solving:  assistance required to identify errors made, assistance required to generate solutions, and assistance required to implement solutions    RANGE OF MOTION AND STRENGTH ASSESSMENT  See OT note for UE assessment      Lower extremity ROM is within functional limits     Lower extremity strength is within functional limits       BALANCE  Static Sitting: Good  Dynamic Sitting: Good  Static Standing: Fair -  Dynamic Standing: Poor +    ADDITIONAL TESTS                                    ACTIVITY TOLERANCE                         O2 WALK       NEUROLOGICAL FINDINGS                        AM-PAC '6-Clicks' INPATIENT SHORT FORM - BASIC MOBILITY  How much difficulty does the patient currently have...  Patient Difficulty: Turning over in bed (including adjusting bedclothes, sheets and blankets)?: A Little   Patient Difficulty: Sitting down on and standing up from a chair with arms (e.g., wheelchair, bedside commode, etc.): A Lot   Patient Difficulty: Moving from lying on back to sitting on the side of the bed?: A Little   How much help from another person does the patient currently need...   Help from Another: Moving to and from a bed to a chair (including a wheelchair)?: A Lot   Help from Another: Need to walk in hospital room?: A Little   Help from Another: Climbing 3-5 steps with a railing?: Total       AM-PAC Score:  Raw Score: 14   Approx Degree of Impairment: 61.29%   Standardized Score (-PAC  Scale): 38.1   CMS Modifier (G-Code): CL    FUNCTIONAL ABILITY STATUS  Gait Assessment   Functional Mobility/Gait Assessment  Gait Assistance: Contact guard assist  Distance (ft): 2  Assistive Device: Rolling walker  Pattern: Shuffle    Skilled Therapy Provided     Bed Mobility:  Rolling: min A  Supine to sit: min A   Sit to supine: NT     Transfer Mobility:  Sit to stand: mod A   Stand to sit: min A  Gait = Bed to chair only.    Therapist's Comments: Pt encouraged to be OOB to chair, to ambulate frequently while in house.  Pt and spouse educated to call staff for all mobility.    Exercise/Education Provided:  Bed mobility  Body mechanics  Functional activity tolerated  Gait training  Transfer training    Patient End of Session: Up in chair;Needs met;Call light within reach;All patient questions and concerns addressed;Family present      Patient Evaluation Complexity Level:  History Moderate - 1 or 2 personal factors and/or co-morbidities   Examination of body systems Moderate - addressing a total of 3 or more elements   Clinical Presentation  Moderate - Evolving   Clinical Decision Making Moderate Complexity       PT Session Time: 20 minutes    Therapeutic Activity: 8 minutes

## 2024-09-13 NOTE — CONSULTS
Gastroenterology Initial Consultation    Gayla Howe Patient Status:  Inpatient    1940 MRN [id number]   [facility] 5NW-A Attending Vito Rodriguez MD   Hosp Day # 1 PCP Mina Correa MD       Reason for Consultation/Chief Complaint: Decompensated Cirrhosis    Date: 24     History of Present Illness:    Ms. 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 Patient follows with Dr. Correa as outpatient. Patient cirrhosis is complicated by ascites and hepatic encephalopathy. She has been non-compliant with her diuretics (lasix/aldactone) and lactulose. She was admitted with confusion and abdominal/le swelling.     History:  Past Medical History:    Cirrhosis (HCC)    Essential hypertension    High blood pressure    Hoarseness, chronic    Leg swelling    Loss of appetite    Wears glasses     History reviewed. No pertinent surgical history.  Family History   Problem Relation Age of Onset    Other (Dyslexia) Mother     Heart Disorder Father       reports that she has never smoked. She has never used smokeless tobacco. She reports that she does not currently use alcohol. She reports that she does not use drugs.    Allergies:  Allergies   Allergen Reactions    Codeine NAUSEA ONLY       Medications:    Current Facility-Administered Medications:     lactulose (ENULOSE) solution 20 g, 20 g, Oral, TID    ceFAZolin (Ancef) 1 g in dextrose 5% 100mL IVPB-ADD, 1 g, Intravenous, Q12H    acetaminophen (Tylenol Extra Strength) tab 500 mg, 500 mg, Oral, Q4H PRN    melatonin tab 3 mg, 3 mg, Oral, Nightly PRN    polyethylene glycol (PEG 3350) (Miralax) 17 g oral packet 17 g, 17 g, Oral, Daily PRN    sennosides (Senokot) tab 17.2 mg, 17.2 mg, Oral, Nightly PRN    bisacodyl (Dulcolax) 10 MG rectal suppository 10 mg, 10 mg, Rectal, Daily PRN    ondansetron (Zofran) 4 MG/2ML injection 4 mg, 4 mg, Intravenous, Q6H PRN    metoclopramide  (Reglan) 5 mg/mL injection 5 mg, 5 mg, Intravenous, Q8H PRN    heparin (Porcine) 5000 UNIT/ML injection 5,000 Units, 5,000 Units, Subcutaneous, 2 times per day    Review of Systems:    Except for what is noted on the HPI the remainder 10 point review of systems is negative.    Gastrointestinal: Except for what is noted on the HPI the remainder 10 point review of systems is negative  General: Denies fatigue, chills/fever, night sweats, weight loss, loss of appetite, weight gain, sleep disturbance.  Neurological: Denies frequent headaches, recent passing out, recent dizziness, convulsions/seizures.  Cardiovascular: Denies history of heart murmur, leg swelling, chest pain or pressure after eating or when upset, angina, irregular heart rate/palpitations.  Respiratory: Denies shortness of breath, chronic/frequent hoarseness, wheezing, chronic cough, spitting up blood, cough up sputum.  Genitourinary: Denies painful/difficult urination, frequent urinary infections, frequent urination, blood in urine, incontinence.  Psychosocial: Denies usually feeling lonely or depressed, denies anxiety, stress, history of eating disorder.  Skin: Denies severe itching, rash, flushing, change in hair or nails.  Bone/joint: Denies back pain, joint pain.  Heme/Lymphatic: Denies easy bruising, excessive bleeding, enlarging or painful lymph nodes.  Allergy: Denies latex/rubber allergy, anaphylactic or other reaction to anesthesia, food allergy.   Eyes: Denies blurred/double vision, eye disease, glasses or contacts, glaucoma.  ENT: Denies nose or gums bleeding, mouth sores, bad breath or bad taste in mouth, hearing loss.      Physical Exam:    Blood pressure 91/50, pulse 73, temperature 97.3 °F (36.3 °C), temperature source Oral, resp. rate 18, height 5' 2\" (1.575 m), weight 141 lb 4.8 oz (64.1 kg), SpO2 98%.    Constitutional: alert and oriented x 3  Ears, Nose, Mouth, Throat: Normal appearance of nose and ears. Normal appearance of lips,  teeth, gums and oral mucosa  Neck: Normal neck inspection, normal thyroid palpation  Respiratory: Normal respiratory effort, normal breath sounds on bilateral auscultation  Cardiovascular: Normal carotid artery pulses bilaterally, no lower extremity edema  Gastrointestinal: Soft, non-tender, distended  Ext: 2 + le edema  Neuro: no asterixis    Data:  Relevant labs, imaging and medications reviewed.     Lab Results   Component Value Date    WBC 7.9 09/13/2024    HGB 9.2 09/13/2024    HCT 26.7 09/13/2024    .0 09/13/2024    CREATSERUM 1.88 09/13/2024    BUN 21 09/13/2024     09/13/2024    K 3.1 09/13/2024     09/13/2024    CO2 19.0 09/13/2024     09/13/2024    CA 9.3 09/13/2024    ALB 2.0 09/13/2024    ALKPHO 116 09/13/2024    BILT 3.5 09/13/2024    TP 6.0 09/13/2024    AST 31 09/13/2024    ALT 14 09/13/2024    INR 1.71 09/12/2024    PTP 20.3 09/12/2024    MG 1.6 09/13/2024           Assessment/Plan:    Ascites/Lower Extremity Edema  -paracentesis with fluid studies  -restart diuretics post para , monitor BP  -low salt diet    Hepatic Encephalopathy  -improved  -continue lactulose , titrate to 3-4 bm's daily  -educated on compliance    Hypotension  -expected in cirrhosis however check orthostatics  -consider midodrine     AMIRA  -mild increase from baseline   -monitor   -albumin with para    Anemia  -no si of gi bleed  -check iron studies  -consider egd     Elevated Bili   -stable, 2/2 cirrhosis    Coagulopathy  -INR 1.7, 2/2 cirrhosis      Fabian Abarca D.O.   Gastroenterology   [facility]

## 2024-09-14 LAB
IRON SATN MFR SERPL: 56 %
IRON SERPL-MCNC: 86 UG/DL
MAGNESIUM SERPL-MCNC: 1.6 MG/DL (ref 1.6–2.6)
POTASSIUM SERPL-SCNC: 3.2 MMOL/L (ref 3.5–5.1)
TOTAL IRON BINDING CAPACITY: 154 UG/DL (ref 250–425)
TRANSFERRIN SERPL-MCNC: 72 MG/DL (ref 250–380)

## 2024-09-14 PROCEDURE — 99232 SBSQ HOSP IP/OBS MODERATE 35: CPT | Performed by: HOSPITALIST

## 2024-09-14 RX ORDER — MAGNESIUM SULFATE HEPTAHYDRATE 40 MG/ML
2 INJECTION, SOLUTION INTRAVENOUS ONCE
Status: COMPLETED | OUTPATIENT
Start: 2024-09-14 | End: 2024-09-14

## 2024-09-14 NOTE — PROGRESS NOTES
Received pt a&ox3, confused at times. O2 WNL on RA. NSR on tele. Incontinent x2, lactulose TID. Sacral wound with mepilex. No c/o pain. Up 1 with walker. IV abx, L PIV TKO. Cardiac diet. Paracentesis pending IR availability. Pt and family updated on POC. No further questions at this time.     Problem: Patient/Family Goals  Goal: Patient/Family Long Term Goal  Description: Patient's Long Term Goal:   Discharge with adequate resources      Interventions:  - Follow care plan   - See additional Care Plan goals for specific interventions  Outcome: Progressing  Goal: Patient/Family Short Term Goal  Description: Patient's Short Term Goal:   Decrease ammonia levels   9/13 noc: get some rest  9/14am: remain comfortable     Interventions:   - Medication per MAR   - See additional Care Plan goals for specific interventions  Outcome: Progressing     Problem: PAIN - ADULT  Goal: Verbalizes/displays adequate comfort level or patient's stated pain goal  Description: INTERVENTIONS:  - Encourage pt to monitor pain and request assistance  - Assess pain using appropriate pain scale  - Administer analgesics based on type and severity of pain and evaluate response  - Implement non-pharmacological measures as appropriate and evaluate response  - Consider cultural and social influences on pain and pain management  - Manage/alleviate anxiety  - Utilize distraction and/or relaxation techniques  - Monitor for opioid side effects  - Notify MD/LIP if interventions unsuccessful or patient reports new pain  - Anticipate increased pain with activity and pre-medicate as appropriate  Outcome: Progressing     Problem: RISK FOR INFECTION - ADULT  Goal: Absence of fever/infection during anticipated neutropenic period  Description: INTERVENTIONS  - Monitor WBC  - Administer growth factors as ordered  - Implement neutropenic guidelines  Outcome: Progressing     Problem: SAFETY ADULT - FALL  Goal: Free from fall injury  Description: INTERVENTIONS:  -  Assess pt frequently for physical needs  - Identify cognitive and physical deficits and behaviors that affect risk of falls.  - [place] fall precautions as indicated by assessment.  - Educate pt/family on patient safety including physical limitations  - Instruct pt to call for assistance with activity based on assessment  - Modify environment to reduce risk of injury  - Provide assistive devices as appropriate  - Consider OT/PT consult to assist with strengthening/mobility  - Encourage toileting schedule  Outcome: Progressing     Problem: DISCHARGE PLANNING  Goal: Discharge to home or other facility with appropriate resources  Description: INTERVENTIONS:  - Identify barriers to discharge w/pt and caregiver  - Include patient/family/discharge partner in discharge planning  - Arrange for needed discharge resources and transportation as appropriate  - Identify discharge learning needs (meds, wound care, etc)  - Arrange for interpreters to assist at discharge as needed  - Consider post-discharge preferences of patient/family/discharge partner  - Complete POLST form as appropriate  - Assess patient's ability to be responsible for managing their own health  - Refer to Case Management Department for coordinating discharge planning if the patient needs post-hospital services based on physician/LIP order or complex needs related to functional status, cognitive ability or social support system  Outcome: Progressing     Problem: Altered Communication/Language Barrier  Goal: Patient/Family is able to understand and participate in their care  Description: Interventions:  - Assess communication ability and preferred communication style  - Implement communication aides and strategies  - Use visual cues when possible  - Listen attentively, be patient, do not interrupt  - Minimize distractions  - Allow time for understanding and response  - Establish method for patient to ask for assistance (call light)  - Provide an   as needed  - Communicate barriers and strategies to overcome with those who interact with patient  Outcome: Progressing

## 2024-09-14 NOTE — PLAN OF CARE
Pt is A&Ox3-4 forgetful. VSS, afebrile and denies any pain. SPO2 maintained on room air. Continuous pulse ox. Denies any SOB, cough. Tele/NS. Heparin. NPO since midnight for possible paracentesis.Denies any n/v/d. Incontinent, briefed. Up x1 stand pivot. IV ancef. Safety precautions in place. Family at bedside. Pt is updated with plan of care.    Problem: Patient/Family Goals  Goal: Patient/Family Long Term Goal  Description: Patient's Long Term Goal:   Discharge with adequate resources      Interventions:  - Follow care plan   - See additional Care Plan goals for specific interventions  Outcome: Progressing  Goal: Patient/Family Short Term Goal  Description: Patient's Short Term Goal:   Decrease ammonia levels   9/13 noc: get some rest    Interventions:   - Medication per MAR   - See additional Care Plan goals for specific interventions  Outcome: Progressing

## 2024-09-14 NOTE — PROGRESS NOTES
Inpatient Follow up Note    Gayla Howe Patient Status:  Inpatient    1940 MRN [id number]   Location [facility] 5NW-A Attending Vito Rodriguez MD   Hosp Day # 2 PCP Mina Correa MD     Reason for Consultation   Hepatic encephalopathy  Decompensated liver disease     Subjective     -Appears better according to  at bedsdie  -Oriented x 3, denies pain  -Feels hungry, waiting for paracentesis to eat           Objective:     /65 (BP Location: Right arm)   Pulse 79   Temp 97.3 °F (36.3 °C) (Oral)   Resp 18   Ht 5' 2\" (1.575 m)   Wt 141 lb 4.8 oz (64.1 kg)   SpO2 97%   BMI 25.84 kg/m²   Gen: AAOx2, frail appearing  CV: RRR with normal S1 / S2  Resp: CTA bilaterally  Abd: (+)BS, soft, distended, non-tender; no rebound or guarding  Ext: No edema or cyanosis  Skin: Warm and dry     Labs/Imaging     LABRCNTIP[PGLU:5@  Recent Labs   Lab 24   INR 1.71*         Recent Labs   Lab 24  0439   WBC 8.7 7.9   HGB 11.2* 9.2*   .0 136.0*       Recent Labs   Lab 24  0438   * 134*   K 4.1 3.1*    104   CO2 19.0* 19.0*   BUN 25* 21   CREATSERUM 1.99* 1.88*       Recent Labs   Lab 24  0438   ALT 17 14   AST 71* 31     XR CHEST AP PORTABLE  (CPT=71045)    Result Date: 2024  CONCLUSION:    Sizable hiatal hernia.  Small left pleural effusion and left lower lobe retrocardiac consolidation, possible pneumonia.  Right lung grossly clear with blunting right costophrenic angle minimal.  Heart mostly obscured.  No sign of pneumothorax or CHF. Consider upright PA and lateral examination of the chest, when tolerated.   LOCATION:  [place]      Dictated by (CST): Daniel Salgado MD on 2024 at 5:49 PM     Finalized by (CST): Dainel Salgado MD on 2024 at 5:50 PM      AST   Date/Time Value Ref Range Status   2024 04:38 AM 31 <34 U/L Final     ALT   Date/Time Value Ref Range Status   2024  04:38 AM 14 10 - 49 U/L Final     BUN   Date/Time Value Ref Range Status   09/13/2024 04:38 AM 21 9 - 23 mg/dL Final              Assessment   Ascites/Lower Extremity Edema      -Non compliant with meds     2.  Hepatic Encephalopathy  -improved       3. Hypotension  -expected in cirrhosis however check orthostatics  -consider midodrine      4. AMIRA  -mild increase from baseline   -monitor   -albumin with para     5. Anemia  -no si of gi bleed  -check iron studies  -consider egd      6. Elevated Bili   -stable, 2/2 cirrhosis     Coagulopathy           Plan     -paracentesis with fluid studies  -restart diuretics post para , monitor BP  -low salt diet  -continue lactulose , titrate to 3-4 bm's daily  -educated on compliance  -Palliative care to see         Puja Edmonds MD  8:50 AM  9/14/2024  [facility]  [phone]

## 2024-09-14 NOTE — PROGRESS NOTES
[facility]   part of [facility]     Hospitalist Progress Note     Gayla Howe Patient Status:  Inpatient    1940 MRN [id number]   Location [facility] 5NW-A Attending Vito Rodriguez MD   Hosp Day # 2 PCP Mina Correa MD     Chief Complaint: AMS    Subjective:     Patient less confused, back to baseline per . No diarrhea. Complains abd distended    Objective:    Review of Systems:   A comprehensive review of systems was completed; pertinent positive and negatives stated in subjective.    Vital signs:  Temp:  [97.2 °F (36.2 °C)-97.7 °F (36.5 °C)] 97.5 °F (36.4 °C)  Pulse:  [75-89] 89  Resp:  [18] 18  BP: (103-121)/(51-78) 121/78  SpO2:  [93 %-97 %] 93 %    Physical Exam:    General: No acute distress  Respiratory: No wheezes, no rhonchi  Cardiovascular: S1, S2, regular rate and rhythm  Abdomen: Soft, Non-tender, distended, positive bowel sounds  Neuro: No new focal deficits.   Extremities: No edema      Diagnostic Data:    Labs:  Recent Labs   Lab 24  0439   WBC 8.7 7.9   HGB 11.2* 9.2*   MCV 94.0 95.0   .0 136.0*   INR 1.71*  --        Recent Labs   Lab 24  0438 24  0801   * 107*  --    BUN 25* 21  --    CREATSERUM 1.99* 1.88*  --    CA 9.8 9.3  --    ALB 2.5* 2.0*  --    * 134*  --    K 4.1 3.1* 3.2*    104  --    CO2 19.0* 19.0*  --    ALKPHO 141 116  --    AST 71* 31  --    ALT 17 14  --    BILT 4.2* 3.5*  --    TP 7.4 6.0  --        Estimated Creatinine Clearance: 17.9 mL/min (A) (based on SCr of 1.88 mg/dL (H)).    Recent Labs   Lab 24   TROPHS 27       Recent Labs   Lab 24   PTP 20.3*   INR 1.71*                  Microbiology    Hospital Encounter on 24   1. Urine Culture, Routine     Status: None    Collection Time: 24  5:13 PM    Specimen: Urine, clean catch   Result Value Ref Range    Urine Culture No Growth at 18-24 hrs. N/A   2. Blood Culture     Status: None  (Preliminary result)    Collection Time: 09/12/24  5:13 PM    Specimen: Blood,peripheral   Result Value Ref Range    Blood Culture Result No Growth 1 Day N/A         Imaging: Reviewed in Epic.    Medications:    lactulose  20 g Oral TID    heparin  5,000 Units Subcutaneous 2 times per day       Assessment & Plan:      #Hepatic encephalopathy  -Lactulose     #Debility  -2/2 above  -therapies to see     #Alcohol-induced cirrhosis  #Ascites  -PTA: Spironolactone/Lasix (family reports non compliance)  -recurrent hospitalizations, palliative care to see  -US para for ascities today   -diuretics started  -monitior BP. May need midodrine  -palliative  -orthostatics      #Hyponatremia  #NAGMA  #CKD3  -dc iv hydration      #Abnormal cxr  -s/p iv abx in ER, pt has cough but improving, pt has known left lobe opacity from pna in aug, for now will hold further pna abx      #Macrocytic anemia  -considering EGD per GI     #Pyuria  -unclear if pt having symptoms  -Empiric Ancef for now  -fu UCx     #Recent hospitalization  -8/3-8/7 where patient was admitted for acute respiratory failure secondary to pneumonia.         Vito Rodriguez MD    Supplementary Documentation:     Quality:  DVT Mechanical Prophylaxis:   SCDs,    DVT Pharmacologic Prophylaxis   Medication    heparin (Porcine) 5000 UNIT/ML injection 5,000 Units                Code Status: DNAR/Selective Treatment  Street: External urinary catheter in place  Street Duration (in days):   Central line:    HEIKE:     Discharge is dependent on: course  At this point Ms. Howe is expected to be discharge to: home    The 21st Century Cures Act makes medical notes like these available to patients in the interest of transparency. Please be advised this is a medical document. Medical documents are intended to carry relevant information, facts as evident, and the clinical opinion of the practitioner. The medical note is intended as peer to peer communication and may appear blunt or  direct. It is written in medical language and may contain abbreviations or verbiage that are unfamiliar.     Dietitian Malnutrition Assessment    Evaluation for Malnutrition: Criteria for non-severe malnutrition diagnosis-         acute illness/injury related to Energy intake less than 75% for greater than 7 days., Fluid accumulation mild.         RD Malnutrition Care Plan: See RD nutrition assessment for additional recommendations.    Body Fat/Muscle Mass:          Physician Assessment     Patient has a diagnosis of moderate malnutrition          **Certification      PHYSICIAN Certification of Need for Inpatient Hospitalization - Initial Certification    Patient will require inpatient services that will reasonably be expected to span two midnight's based on the clinical documentation in H+P.   Based on patients current state of illness, I anticipate that, after discharge, patient will require TBD.

## 2024-09-15 LAB
ALBUMIN SERPL-MCNC: 2 G/DL (ref 3.2–4.8)
ALBUMIN/GLOB SERPL: 0.6 {RATIO} (ref 1–2)
ALP LIVER SERPL-CCNC: 107 U/L
ALT SERPL-CCNC: <7 U/L
ANION GAP SERPL CALC-SCNC: 9 MMOL/L (ref 0–18)
AST SERPL-CCNC: 34 U/L (ref ?–34)
BASOPHILS # BLD AUTO: 0.07 X10(3) UL (ref 0–0.2)
BASOPHILS NFR BLD AUTO: 1 %
BILIRUB SERPL-MCNC: 2.9 MG/DL (ref 0.2–1.1)
BUN BLD-MCNC: 22 MG/DL (ref 9–23)
CALCIUM BLD-MCNC: 9.4 MG/DL (ref 8.7–10.4)
CHLORIDE SERPL-SCNC: 107 MMOL/L (ref 98–112)
CO2 SERPL-SCNC: 21 MMOL/L (ref 21–32)
CREAT BLD-MCNC: 1.92 MG/DL
EGFRCR SERPLBLD CKD-EPI 2021: 26 ML/MIN/1.73M2 (ref 60–?)
EOSINOPHIL # BLD AUTO: 0.65 X10(3) UL (ref 0–0.7)
EOSINOPHIL NFR BLD AUTO: 9.3 %
ERYTHROCYTE [DISTWIDTH] IN BLOOD BY AUTOMATED COUNT: 15.2 %
GLOBULIN PLAS-MCNC: 3.6 G/DL (ref 2–3.5)
GLUCOSE BLD-MCNC: 88 MG/DL (ref 70–99)
HCT VFR BLD AUTO: 24.9 %
HGB BLD-MCNC: 8.7 G/DL
IMM GRANULOCYTES # BLD AUTO: 0.02 X10(3) UL (ref 0–1)
IMM GRANULOCYTES NFR BLD: 0.3 %
LYMPHOCYTES # BLD AUTO: 1.69 X10(3) UL (ref 1–4)
LYMPHOCYTES NFR BLD AUTO: 24.1 %
MAGNESIUM SERPL-MCNC: 2 MG/DL (ref 1.6–2.6)
MCH RBC QN AUTO: 33.2 PG (ref 26–34)
MCHC RBC AUTO-ENTMCNC: 34.9 G/DL (ref 31–37)
MCV RBC AUTO: 95 FL
MONOCYTES # BLD AUTO: 0.62 X10(3) UL (ref 0.1–1)
MONOCYTES NFR BLD AUTO: 8.9 %
NEUTROPHILS # BLD AUTO: 3.95 X10 (3) UL (ref 1.5–7.7)
NEUTROPHILS # BLD AUTO: 3.95 X10(3) UL (ref 1.5–7.7)
NEUTROPHILS NFR BLD AUTO: 56.4 %
OSMOLALITY SERPL CALC.SUM OF ELEC: 287 MOSM/KG (ref 275–295)
PLATELET # BLD AUTO: 141 10(3)UL (ref 150–450)
POTASSIUM SERPL-SCNC: 3.3 MMOL/L (ref 3.5–5.1)
POTASSIUM SERPL-SCNC: 3.3 MMOL/L (ref 3.5–5.1)
PROT SERPL-MCNC: 5.6 G/DL (ref 5.7–8.2)
RBC # BLD AUTO: 2.62 X10(6)UL
SODIUM SERPL-SCNC: 137 MMOL/L (ref 136–145)
WBC # BLD AUTO: 7 X10(3) UL (ref 4–11)

## 2024-09-15 PROCEDURE — 99232 SBSQ HOSP IP/OBS MODERATE 35: CPT | Performed by: HOSPITALIST

## 2024-09-15 RX ORDER — SPIRONOLACTONE 25 MG/1
50 TABLET ORAL DAILY
Status: DISCONTINUED | OUTPATIENT
Start: 2024-09-15 | End: 2024-09-15

## 2024-09-15 RX ORDER — FUROSEMIDE 20 MG
20 TABLET ORAL DAILY
Status: DISCONTINUED | OUTPATIENT
Start: 2024-09-15 | End: 2024-09-15

## 2024-09-15 RX ORDER — ALBUMIN (HUMAN) 12.5 G/50ML
25 SOLUTION INTRAVENOUS ONCE
Status: COMPLETED | OUTPATIENT
Start: 2024-09-16 | End: 2024-09-16

## 2024-09-15 RX ORDER — POTASSIUM CHLORIDE 1500 MG/1
40 TABLET, EXTENDED RELEASE ORAL ONCE
Status: COMPLETED | OUTPATIENT
Start: 2024-09-15 | End: 2024-09-15

## 2024-09-15 NOTE — PROGRESS NOTES
[facility]   part of [facility]     Hospitalist Progress Note     Gayla Howe Patient Status:  Inpatient    1940 MRN [id number]   Location [facility] 5NW-A Attending Vito Rodriguez MD   Hosp Day # 3 PCP Mina Correa MD     Chief Complaint: AMS    Subjective:     Patient less confused, back to baseline per . No diarrhea. Complains abd distended    Objective:    Review of Systems:   A comprehensive review of systems was completed; pertinent positive and negatives stated in subjective.    Vital signs:  Temp:  [97.1 °F (36.2 °C)-98.1 °F (36.7 °C)] 97.7 °F (36.5 °C)  Pulse:  [] 78  Resp:  [16-18] 17  BP: (107-130)/(58-82) 114/65  SpO2:  [93 %-95 %] 95 %    Physical Exam:    General: No acute distress  Respiratory: No wheezes, no rhonchi  Cardiovascular: S1, S2, regular rate and rhythm  Abdomen: Soft, Non-tender, distended, positive bowel sounds  Neuro: No new focal deficits.   Extremities: No edema      Diagnostic Data:    Labs:  Recent Labs   Lab 24  0439 09/15/24  0649   WBC 8.7 7.9 7.0   HGB 11.2* 9.2* 8.7*   MCV 94.0 95.0 95.0   .0 136.0* 141.0*   INR 1.71*  --   --        Recent Labs   Lab 243 24  0438 24  0801 09/15/24  0649   * 107*  --  88   BUN 25* 21  --  22   CREATSERUM 1.99* 1.88*  --  1.92*   CA 9.8 9.3  --  9.4   ALB 2.5* 2.0*  --  2.0*   * 134*  --  137   K 4.1 3.1* 3.2* 3.3*  3.3*    104  --  107   CO2 19.0* 19.0*  --  21.0   ALKPHO 141 116  --  107   AST 71* 31  --  34*   ALT 17 14  --  <7*   BILT 4.2* 3.5*  --  2.9*   TP 7.4 6.0  --  5.6*       Estimated Creatinine Clearance: 17.6 mL/min (A) (based on SCr of 1.92 mg/dL (H)).    Recent Labs   Lab 24  1713   TROPHS 27       Recent Labs   Lab 24   PTP 20.3*   INR 1.71*                  Microbiology    Hospital Encounter on 24   1. Urine Culture, Routine     Status: None    Collection Time: 24  5:13 PM    Specimen:  Urine, clean catch   Result Value Ref Range    Urine Culture No Growth at 18-24 hrs. N/A   2. Blood Culture     Status: None (Preliminary result)    Collection Time: 09/12/24  5:13 PM    Specimen: Blood,peripheral   Result Value Ref Range    Blood Culture Result No Growth 2 Days N/A         Imaging: Reviewed in Epic.    Medications:    [START ON 9/16/2024] albumin human  25 g Intravenous Once    lactulose  20 g Oral TID    heparin  5,000 Units Subcutaneous 2 times per day       Assessment & Plan:      #Hepatic encephalopathy  -Lactulose     #Debility  -2/2 above  -therapies to see     #Alcohol-induced cirrhosis  #Ascites  -PTA: Spironolactone/Lasix (family reports non compliance)  -recurrent hospitalizations, palliative care to see  -US para for ascities tomorrow   -diuretics started. D/w GI today   -monitior BP. May need midodrine  -palliative  -orthostatics after para     #Hyponatremia  #NAGMA  #CKD3  -dc iv hydration      #Abnormal cxr  -s/p iv abx in ER, pt has cough but improving, pt has known left lobe opacity from pna in aug, for now will hold further pna abx      #Macrocytic anemia  -considering EGD per GI     #Pyuria  -ruled out UTI  -DC Empiric Ancef   -Ucx neg     #Recent hospitalization  -8/3-8/7 where patient was admitted for acute respiratory failure secondary to pneumonia.         Vito Rodriguez MD    Supplementary Documentation:     Quality:  DVT Mechanical Prophylaxis:   SCDs,    DVT Pharmacologic Prophylaxis   Medication    heparin (Porcine) 5000 UNIT/ML injection 5,000 Units                Code Status: DNAR/Selective Treatment  Street: External urinary catheter in place  Street Duration (in days):   Central line:    HEIKE:     Discharge is dependent on: course  At this point Ms. Howe is expected to be discharge to: home    The 21st Century Cures Act makes medical notes like these available to patients in the interest of transparency. Please be advised this is a medical document. Medical documents  are intended to carry relevant information, facts as evident, and the clinical opinion of the practitioner. The medical note is intended as peer to peer communication and may appear blunt or direct. It is written in medical language and may contain abbreviations or verbiage that are unfamiliar.     Dietitian Malnutrition Assessment    Evaluation for Malnutrition: Criteria for non-severe malnutrition diagnosis-         acute illness/injury related to Energy intake less than 75% for greater than 7 days., Fluid accumulation mild.         RD Malnutrition Care Plan: See RD nutrition assessment for additional recommendations.    Body Fat/Muscle Mass:          Physician Assessment     Patient has a diagnosis of moderate malnutrition          **Certification      PHYSICIAN Certification of Need for Inpatient Hospitalization - Initial Certification    Patient will require inpatient services that will reasonably be expected to span two midnight's based on the clinical documentation in H+P.   Based on patients current state of illness, I anticipate that, after discharge, patient will require TBD.

## 2024-09-15 NOTE — CM/SW NOTE
Patient is current with [facility], referral and MARIA DEL CARMEN sent in aidin.    Dizko Samurai [facility]  Phone: [phone]  Fax: [phone]      LUPE Lowery  Discharge Planner  [phone]

## 2024-09-15 NOTE — PROGRESS NOTES
Inpatient Follow up Note    Gayla Howe Patient Status:  Inpatient    1940 MRN [id number]   Location [facility] 5NW-A Attending Vito Rodriguez MD   Hosp Day # 3 PCP Mina Correa MD     Reason for Consultation   Hepatic encephalopathy  Decompensated liver disease     Subjective     -No new complaints, tolerated diet  -Unable to get paracentesis yesterday due to lack of appt in radiology  -Family at bedside, voiced concerns of her non-compliance with meds  -Had trouble getting to bathroom on time with lactulose, diuretics           Objective:     /65 (BP Location: Right arm)   Pulse 78   Temp 97.7 °F (36.5 °C) (Oral)   Resp 17   Ht 5' 2\" (1.575 m)   Wt 141 lb 4.8 oz (64.1 kg)   SpO2 95%   BMI 25.84 kg/m²   Gen: AAOx2, frail appearing  CV: RRR with normal S1 / S2  Resp: CTA bilaterally  Abd: (+)BS, soft, distended, firm, non-tender; no rebound or guarding  Ext: No edema or cyanosis  Skin: Warm and dry     Labs/Imaging     LABRCNTIP[PGLU:5@  Recent Labs   Lab 24   INR 1.71*         Recent Labs   Lab 09/12/24  1713 09/13/24  0439 09/15/24  0649   WBC 8.7 7.9 7.0   HGB 11.2* 9.2* 8.7*   .0 136.0* 141.0*       Recent Labs   Lab 24  0801 09/15/24  0649   * 134*  --  137   K 4.1 3.1* 3.2* 3.3*  3.3*    104  --  107   CO2 19.0* 19.0*  --  21.0   BUN 25* 21  --  22   CREATSERUM 1.99* 1.88*  --  1.92*       Recent Labs   Lab 248 09/15/24  0649   ALT 17 14 <7*   AST 71* 31 34*     XR CHEST AP PORTABLE  (CPT=71045)    Result Date: 2024  CONCLUSION:    Sizable hiatal hernia.  Small left pleural effusion and left lower lobe retrocardiac consolidation, possible pneumonia.  Right lung grossly clear with blunting right costophrenic angle minimal.  Heart mostly obscured.  No sign of pneumothorax or CHF. Consider upright PA and lateral examination of the chest, when tolerated.   LOCATION:  [facility]       Dictated by (CST): Daniel Salgado MD on 9/12/2024 at 5:49 PM     Finalized by (CST): Daniel Salgado MD on 9/12/2024 at 5:50 PM        AST   Date/Time Value Ref Range Status   09/15/2024 06:49 AM 34 (H) <34 U/L Final     ALT   Date/Time Value Ref Range Status   09/15/2024 06:49 AM <7 (L) 10 - 49 U/L Final     BUN   Date/Time Value Ref Range Status   09/15/2024 06:49 AM 22 9 - 23 mg/dL Final              Assessment   Ascites/Lower Extremity Edema      -Non compliant with meds due to inability to get to bathroom on time     2.  Hepatic Encephalopathy  -improved       3. Hypotension  -improved     4. AMIRA  -mild increase from baseline   -monitor   -albumin with para     5. Anemia  -no si of gi bleed  -check iron studies  -consider egd      6. Elevated Bili   -stable, 2/2 cirrhosis     Coagulopathy           Plan     -paracentesis with fluid studies  -Resume trial of diuretics today monitor BP  -High protein diet  -continue lactulose , titrate to 3-4 bm's daily  -educated on compliance  -Palliative care to see         Puja Edmonds MD  12:41 PM  9/15/2024  [facility]  [phone]

## 2024-09-15 NOTE — PLAN OF CARE
Patient is alert and oriented x 4, forgetful at times. Vitals stable on room air. Denies pain. Mepilex to bottom and bunny boots to heels. Up moderate assist x2. Plan for possible paracentesis. Plan of care discussed with patient.

## 2024-09-15 NOTE — PROGRESS NOTES
09/14/24 2240 09/14/24 2245 09/14/24 2250   Vital Signs   /71 130/82 125/70   MAP (mmHg) 85 93 83   BP Location Right arm Right arm Right arm   BP Method Automatic Automatic Automatic   Patient Position Lying Sitting Standing     Orthostatic BP

## 2024-09-15 NOTE — PLAN OF CARE
Pt is aox3, can be confused. On RA. Tele NSR. Abd distended and tender, waiting for paracentesis. Briefed and incontinent. +4 edema in legs and feet. Up x2 stand pivot to the chair. IV saline locked. Cardiac diet tolerating well with poor appetite. Pt c/o generalized pain. No c/o SOB or n/v/d. Family at bedside, updated on poc. Resting in bed with call light in place. Bed alarm on. Will continue to monitor.         Problem: PAIN - ADULT  Goal: Verbalizes/displays adequate comfort level or patient's stated pain goal  Description: INTERVENTIONS:  - Encourage pt to monitor pain and request assistance  - Assess pain using appropriate pain scale  - Administer analgesics based on type and severity of pain and evaluate response  - Implement non-pharmacological measures as appropriate and evaluate response  - Consider cultural and social influences on pain and pain management  - Manage/alleviate anxiety  - Utilize distraction and/or relaxation techniques  - Monitor for opioid side effects  - Notify MD/LIP if interventions unsuccessful or patient reports new pain  - Anticipate increased pain with activity and pre-medicate as appropriate  Outcome: Progressing     Problem: RISK FOR INFECTION - ADULT  Goal: Absence of fever/infection during anticipated neutropenic period  Description: INTERVENTIONS  - Monitor WBC  - Administer growth factors as ordered  - Implement neutropenic guidelines  Outcome: Progressing     Problem: SAFETY ADULT - FALL  Goal: Free from fall injury  Description: INTERVENTIONS:  - Assess pt frequently for physical needs  - Identify cognitive and physical deficits and behaviors that affect risk of falls.  - [place] fall precautions as indicated by assessment.  - Educate pt/family on patient safety including physical limitations  - Instruct pt to call for assistance with activity based on assessment  - Modify environment to reduce risk of injury  - Provide assistive devices as appropriate  - Consider OT/PT  consult to assist with strengthening/mobility  - Encourage toileting schedule  Outcome: Progressing

## 2024-09-16 ENCOUNTER — APPOINTMENT (OUTPATIENT)
Dept: ULTRASOUND IMAGING | Facility: HOSPITAL | Age: 84
End: 2024-09-16
Attending: INTERNAL MEDICINE
Payer: MEDICARE

## 2024-09-16 PROBLEM — N17.9 ACUTE KIDNEY INJURY SUPERIMPOSED ON STAGE 3B CHRONIC KIDNEY DISEASE (HCC): Status: ACTIVE | Noted: 2024-01-01

## 2024-09-16 PROBLEM — N17.9 ACUTE KIDNEY INJURY SUPERIMPOSED ON STAGE 3B CHRONIC KIDNEY DISEASE (HCC): Status: ACTIVE | Noted: 2024-09-16

## 2024-09-16 PROBLEM — N18.32 ACUTE KIDNEY INJURY SUPERIMPOSED ON STAGE 3B CHRONIC KIDNEY DISEASE (HCC): Status: ACTIVE | Noted: 2024-09-16

## 2024-09-16 PROBLEM — N18.32 ACUTE KIDNEY INJURY SUPERIMPOSED ON STAGE 3B CHRONIC KIDNEY DISEASE (HCC): Status: ACTIVE | Noted: 2024-01-01

## 2024-09-16 LAB
ANION GAP SERPL CALC-SCNC: 8 MMOL/L (ref 0–18)
BASOPHILS NFR PRT: 0 %
BUN BLD-MCNC: 22 MG/DL (ref 9–23)
CALCIUM BLD-MCNC: 9.9 MG/DL (ref 8.7–10.4)
CHLORIDE SERPL-SCNC: 104 MMOL/L (ref 98–112)
CO2 SERPL-SCNC: 22 MMOL/L (ref 21–32)
COLOR FLD: YELLOW
CREAT BLD-MCNC: 1.95 MG/DL
EGFRCR SERPLBLD CKD-EPI 2021: 25 ML/MIN/1.73M2 (ref 60–?)
EOSINOPHIL NFR PRT: 0 %
GLUCOSE BLD-MCNC: 91 MG/DL (ref 70–99)
INR BLD: 2.45 (ref 0.8–1.2)
LYMPHOCYTES NFR PRT: 29 %
MESOTHL CELL NFR PRT: 2 %
MONOS+MACROS NFR PRT: 62 %
NEUTROPHILS PERITONEAL FLUID: 7 %
OSMOLALITY SERPL CALC.SUM OF ELEC: 281 MOSM/KG (ref 275–295)
POTASSIUM SERPL-SCNC: 3.6 MMOL/L (ref 3.5–5.1)
POTASSIUM SERPL-SCNC: 3.6 MMOL/L (ref 3.5–5.1)
PROTHROMBIN TIME: 26.9 SECONDS (ref 11.6–14.8)
RBC PERITONEAL FLUID: <3000 /MM3
SODIUM SERPL-SCNC: 134 MMOL/L (ref 136–145)
SODIUM SERPL-SCNC: 27 MMOL/L
TOTAL CELLS COUNTED FLD: 100
TURBIDITY CSF QL: CLEAR
WBC # PRT: 105 /MM3

## 2024-09-16 PROCEDURE — 49083 ABD PARACENTESIS W/IMAGING: CPT | Performed by: INTERNAL MEDICINE

## 2024-09-16 PROCEDURE — 99233 SBSQ HOSP IP/OBS HIGH 50: CPT | Performed by: NURSE PRACTITIONER

## 2024-09-16 PROCEDURE — 99222 1ST HOSP IP/OBS MODERATE 55: CPT | Performed by: INTERNAL MEDICINE

## 2024-09-16 PROCEDURE — 0W9G3ZZ DRAINAGE OF PERITONEAL CAVITY, PERCUTANEOUS APPROACH: ICD-10-PCS | Performed by: RADIOLOGY

## 2024-09-16 PROCEDURE — 99232 SBSQ HOSP IP/OBS MODERATE 35: CPT | Performed by: HOSPITALIST

## 2024-09-16 RX ORDER — FUROSEMIDE 20 MG
20 TABLET ORAL DAILY
Status: DISCONTINUED | OUTPATIENT
Start: 2024-09-16 | End: 2024-09-17

## 2024-09-16 RX ORDER — SPIRONOLACTONE 25 MG/1
50 TABLET ORAL DAILY
Status: DISCONTINUED | OUTPATIENT
Start: 2024-09-16 | End: 2024-09-17

## 2024-09-16 RX ORDER — LACTULOSE 10 G/15ML
20 SOLUTION ORAL 3 TIMES DAILY
Qty: 2700 ML | Refills: 0 | Status: SHIPPED | OUTPATIENT
Start: 2024-09-16 | End: 2024-10-16

## 2024-09-16 NOTE — PLAN OF CARE
Pt is aox4, can be confused. On RA. Tele NSR. Has paracentesis this AM, tolerated well. Briefed and incontinent. +4 edema in legs and feet. Up x2 stand pivot to the chair. IV saline locked. Cardiac diet tolerating well with poor appetite. Pt c/o generalized pain. No c/o SOB or n/v/d. Family at bedside, updated on poc. Possible dc today pending rehab vs home decision. Resting in bed with call light in place. Bed alarm on. Will continue to monitor.         Problem: PAIN - ADULT  Goal: Verbalizes/displays adequate comfort level or patient's stated pain goal  Description: INTERVENTIONS:  - Encourage pt to monitor pain and request assistance  - Assess pain using appropriate pain scale  - Administer analgesics based on type and severity of pain and evaluate response  - Implement non-pharmacological measures as appropriate and evaluate response  - Consider cultural and social influences on pain and pain management  - Manage/alleviate anxiety  - Utilize distraction and/or relaxation techniques  - Monitor for opioid side effects  - Notify MD/LIP if interventions unsuccessful or patient reports new pain  - Anticipate increased pain with activity and pre-medicate as appropriate  Outcome: Progressing     Problem: RISK FOR INFECTION - ADULT  Goal: Absence of fever/infection during anticipated neutropenic period  Description: INTERVENTIONS  - Monitor WBC  - Administer growth factors as ordered  - Implement neutropenic guidelines  Outcome: Progressing     Problem: SAFETY ADULT - FALL  Goal: Free from fall injury  Description: INTERVENTIONS:  - Assess pt frequently for physical needs  - Identify cognitive and physical deficits and behaviors that affect risk of falls.  - [place] fall precautions as indicated by assessment.  - Educate pt/family on patient safety including physical limitations  - Instruct pt to call for assistance with activity based on assessment  - Modify environment to reduce risk of injury  - Provide assistive  devices as appropriate  - Consider OT/PT consult to assist with strengthening/mobility  - Encourage toileting schedule  Outcome: Progressing     Problem: Patient/Family Goals  Goal: Patient/Family Short Term Goal  Description: Patient's Short Term Goal:   Decrease ammonia levels   9/13 noc: get some rest  9/14am: remain comfortable   9/15 AM: get washed up, go on a walk  9/16 AM: get paracentesis    Interventions:   - Medication per MAR   - See additional Care Plan goals for specific interventions  Outcome: Progressing

## 2024-09-16 NOTE — CONSULTS
[facility]  Report of Consultation    Gayla Howe Patient Status:  Inpatient    1940 MRN [id number]   Location [facility] 5NW-A Attending Vito Rodriguez MD   Hosp Day # 4 PCP Mina Correa MD     Reason for Consultation:  Acute kidney injury, diuretic management    History of Present Illness:  Gayla Howe is a a 84 year old female with history of HTN, cirrhosis and CKD3b who presented with abdominal bloating and weakness. Nephrology service consulted for acute kidney injury and assistance with diuretics.     Patient initially presented on  for anorexia, confusion, abdominal distention and weakness. Lactulose was resumed and her mental status improved. She was resumed on aldactone 50mg daily and lasix 20mg daily yesterday. She had paracentesis with 3L removed with albumin given afterwards. Episodes of low blood pressure have been noted, though systolic blood pressures are usually above 100 and MAPs above 65.     She was recently admitted in 2024 where she was diagnosed with cirrhosis and was discharged with lasix 20mg daily, aldactone 50mg daily and lactulose which she had not been taking. She had not taken her medications due to concern about getting to the bathroom in time given her weakness. Per family, they plan to have the commode next to her bed now so she does not have to go too far.     History:  Past Medical History:    Cirrhosis (HCC)    Essential hypertension    High blood pressure    Hoarseness, chronic    Leg swelling    Loss of appetite    Wears glasses     History reviewed. No pertinent surgical history.  Family History   Problem Relation Age of Onset    Other (Dyslexia) Mother     Heart Disorder Father      Denies family history of kidney disease.    reports that she has never smoked. She has never used smokeless tobacco. She reports that she does not currently use alcohol. She reports that she does not use drugs.    Allergies:  Allergies   Allergen Reactions     Codeine NAUSEA ONLY       Medications:    Current Facility-Administered Medications:     furosemide (Lasix) tab 20 mg, 20 mg, Oral, Daily    spironolactone (Aldactone) tab 50 mg, 50 mg, Oral, Daily    lactulose (ENULOSE) solution 20 g, 20 g, Oral, TID    acetaminophen (Tylenol Extra Strength) tab 500 mg, 500 mg, Oral, Q4H PRN    melatonin tab 3 mg, 3 mg, Oral, Nightly PRN    polyethylene glycol (PEG 3350) (Miralax) 17 g oral packet 17 g, 17 g, Oral, Daily PRN    sennosides (Senokot) tab 17.2 mg, 17.2 mg, Oral, Nightly PRN    bisacodyl (Dulcolax) 10 MG rectal suppository 10 mg, 10 mg, Rectal, Daily PRN    ondansetron (Zofran) 4 MG/2ML injection 4 mg, 4 mg, Intravenous, Q6H PRN    metoclopramide (Reglan) 5 mg/mL injection 5 mg, 5 mg, Intravenous, Q8H PRN    heparin (Porcine) 5000 UNIT/ML injection 5,000 Units, 5,000 Units, Subcutaneous, 2 times per day  No current outpatient medications on file.       Review of Systems:  Please see HPI for pertinent positives. 10 point review of systems otherwise reviewed and negative.     Physical Exam:  /58 (BP Location: Left arm)   Pulse 79   Temp 97.9 °F (36.6 °C) (Oral)   Resp 18   Ht 5' 2\" (1.575 m)   Wt 141 lb 4.8 oz (64.1 kg)   SpO2 96%   BMI 25.84 kg/m²   Temp (24hrs), Av.8 °F (36.6 °C), Min:97.6 °F (36.4 °C), Max:97.9 °F (36.6 °C)       Intake/Output Summary (Last 24 hours) at 2024 1345  Last data filed at 2024 0300  Gross per 24 hour   Intake --   Output 100 ml   Net -100 ml     Wt Readings from Last 3 Encounters:   24 141 lb 4.8 oz (64.1 kg)   08/15/24 135 lb (61.2 kg)   24 135 lb (61.2 kg)     General: Awake, alert  HEENT: No scleral icterus, MMM  Neck: Supple, no DAVIS or thyromegaly  Cardiac: Regular rate and rhythm, S1, S2 normal  Lungs: No increased work of breathing, clear to auscultation  Abdomen: Soft, non-tender. + bowel sounds  Extremities: Without clubbing, cyanosis; no edema  Skin: Warm and dry, no rashes    Laboratory  Data:  Lab Results   Component Value Date    CREATSERUM 1.95 09/16/2024    BUN 22 09/16/2024     09/16/2024    K 3.6 09/16/2024    K 3.6 09/16/2024     09/16/2024    CO2 22.0 09/16/2024    GLU 91 09/16/2024    CA 9.9 09/16/2024    INR 2.45 09/16/2024    PTP 26.9 09/16/2024       Imaging:  All imaging studies reviewed.      Assessment / Plan:    1) Acute kidney injury on CKD3b: Baseline creatinine ~1.3 mg/dL, slightly higher during this hospitalization. Was not taking diuretics at home. Urinalysis with protein, WBCs and RBCs. Received volume with normal saline and albumin with some improvement in renal function. Now s/p paracentesis with 3L removed and albumin. Concern for either HRS vs pre-renal acute kidney injury, will repeat UA and obtain urine sodium. Trend labs and strict I/O's. Should have repeat BMP in one week of discharge.   2) Decompensated Cirrhosis: Newly diagnosed. Complicated by encephalopathy and ascites. On lactulose and now resumed on Lasix 20mg daily and aldactone 50mg daily to see if will help with recurrent ascites accumulation. Discussed with patient and family that may have to allow for higher serum creatinine in order to maintain euvolemia but will require titration as well as compliance with diuretics.   3) Hyponatremia: Stable, in setting of hypervolemic hyponatremia    Thank you for allowing me to participate in the care of your patient.    Marissa Choudhury MD  9/16/2024

## 2024-09-16 NOTE — PROCEDURES
PROCEDURE: US PARACENTESIS W IMAGING  (CPT=49083)    COMPARISON: [facility] , US, US PARACENTESIS W IMAGING (CPT=49083), 8/06/2024, 9:56 AM.  [facility] , XR, XR CHEST AP PORTABLE  (CPT=71045), 9/12/2024, 5:23 PM.    INDICATIONS: ascites    DESCRIPTION OF PROCEDURE: The risks, benefits, and alternatives of the procedure were explained to the patient and informed written and verbal consent was documented in the chart. Time out was performed by the staff. Potential complications including bleeding and infection were discussed with the patient and they related understanding. After obtaining informed consent, an ultrasound-guided paracentesis was performed in the usual sterile manner. The right lower quadrant abdominal wall was prepped and draped in sterile fashion and 1% lidocaine was used for local anesthetic.    LOCATION: Right lower quadrant  FLUID REMOVED: 3 L of straw-colored ascites  MEDICATION: 10 cc of 1% Lidocaine for local anesthetic.  COMPLICATIONS: None.  LABORATORY: Specimen submitted to pathology for further analysis.    CONCLUSION: Ultrasound-guided paracentesis was performed without complication.

## 2024-09-16 NOTE — PROGRESS NOTES
Gastroenterology Progress Note  Gayla Howe Patient Status:  Inpatient    1940 MRN [id number]   Location [facility] 5NW-A Attending Vito Rodriguez MD   Hosp Day # 4 PCP Mina Correa MD     Chief Complaint: Decompensated cirrhosis with hepatic encephalopathy, ascites/leg edema   S: Pt eating sandwich and chips--appetite remains poor but pt denies abd pain, nausea, vomiting. Pt with 2 stools overnight per bedside RN and no overt GI bleeding. Pt without confusion per bedside RN + daughter/ at bedside    O: /58 (BP Location: Left arm)   Pulse 79   Temp 97.9 °F (36.6 °C) (Oral)   Resp 18   Ht 5' 2\" (1.575 m)   Wt 141 lb 4.8 oz (64.1 kg)   SpO2 96%   BMI 25.84 kg/m²   Gen: AAOx3  CV: RRR with normal S1 / S2  Resp: CTA bilaterally; No increase in respiratory effort   Abd: (+)BS, soft, non-tender, non-distended; no rebound or guarding  Ext: 2+ edema to bilateral legs  Skin: Warm and dry  Neuro: No asterixis   Laboratory Data:     No results for input(s): \"PGLU\" in the last 168 hours.  Recent Labs   Lab 24  0808   INR 1.71* 2.45*         Recent Labs   Lab 24  0439 09/15/24  0649   WBC 8.7 7.9 7.0   HGB 11.2* 9.2* 8.7*   .0 136.0* 141.0*       Recent Labs   Lab 24  0438 24  0801 09/15/24  0649 24  0803   * 134*  --  137 134*   K 4.1 3.1* 3.2* 3.3*  3.3* 3.6  3.6    104  --  107 104   CO2 19.0* 19.0*  --  21.0 22.0   BUN 25* 21  --  22 22   CREATSERUM 1.99* 1.88*  --  1.92* 1.95*       Recent Labs   Lab 24  0438 09/15/24  0649   ALT 17 14 <7*   AST 71* 31 34*     Imaging:  PROCEDURE:  US PARACENTESIS W IMAGING  (CPT=49083)     COMPARISON:  [facility] , US, US PARACENTESIS W IMAGING (CPT=49083), 2024, 9:56 AM.  [facility] , XR, XR CHEST AP PORTABLE  (CPT=71045), 2024, 5:23 PM.     INDICATIONS:  ascites     DESCRIPTION OF  PROCEDURE:  The risks, benefits, and alternatives of the procedure were explained to the patient and informed written and verbal consent was documented in the chart. Time out was performed by the staff. Potential complications including  bleeding and infection were discussed with the patient and they related understanding. After obtaining informed consent, an ultrasound-guided paracentesis was performed in the usual sterile manner. The right lower quadrant abdominal wall was prepped and  draped in sterile fashion and 1% lidocaine was used for local anesthetic.     LOCATION:  Right lower quadrant  FLUID REMOVED:  3 L of straw-colored ascites  MEDICATION:  10 cc of 1% Lidocaine for local anesthetic.  COMPLICATIONS:  None.  LABORATORY:  Specimen submitted to pathology for further analysis.      Impression   CONCLUSION:  Ultrasound-guided paracentesis was performed without complication.     LOCATION:  [facility]      Dictated by (CST): Barney Gillette MD on 9/16/2024 at 11:46 AM      Finalized by (CST): Barney Gillette MD on 9/16/2024 at 11:47 AM      Assessment: 84 yr-old female with hx of cryptogenic +/-cirrhosis admitted 9/12 with ascites + new hepatic encephalopathy.   Encephalopathy has resolved with lactulose and pt underwent US paracentesis today removing 3L--fluid studies in progress. Renal consulted to help with diuretics given CKD and hypotension. No overt GI bleeding.   Plan:   Renal consult for recommendations regarding diuretics in the setting of CKD + hypotension. Appreciate recommendations   Continue lactulose to achieve 3-4 stools per day. Discussed with pt and her spouse + daughter at bedside   Dietary consult to provide suggestion on higher protein meals. Pt also encouraged to eat smaller more frequent amounts  Pt and family would not like to pursue EGD for variceal screening. No overt GI bleeding noted--continue to monitor   Await fluid studies from US guided paracentesis. Pt already received 25gm of  Albumin 25%  Will contact office to arrange a close office visit with Dr Correa and potentially arrange outpatient US guided paracentesis and monitor clinical status   Continue complete EtOH abstinence and hold sedatives/narcotics as able   OK to be discharge today if cleared by other services   Case discussed and reviewed with Dr Grey Chandler, APRN  1:02 PM  9/16/2024  [facility]  [phone]    GI attending addendum:    I have personally seen and examined this patient and agree with above nurse practitioner's assessment and recommendations.     Puja Edmonds MD  5:07 PM  9/16/2024  [facility]  [phone]

## 2024-09-16 NOTE — CM/SW NOTE
Met with patient, , and daughter at bedside to discuss discharge planning. Patient/ live in a house in [place]. She was current with [facility] prior to admission. They brought up [facility], explained currently she is doing \"too well\" for [facility] based on notes and likely would not get insurance auth. Explained pros and cons of [facility], they were understanding. They would like OT to see patient to determine safest plan. Patient's  is also in his 80's so caring for patient on his own may be challenging. Provided caregiver list and [facility] contact information.    Await OT to see.    CPC pending, await response from [facility], [facility] is able to accept if [facility] cannot.     POLST form uploaded to EMR by PC.    SW/CM to remain available for support and/or discharge planning.    LUPE Lowery  Discharge Planner  [phone]

## 2024-09-16 NOTE — PAYOR COMM NOTE
RECONSIDERATION REQUEST       **As a reminder, Medicare Advantage plans are instructed to provide, at a minimum, the same coverage that a traditional Medicare beneficiary would receive. Beyond any doubt, a traditional Medicare recipient in this same situation would have received Medicare coverage for these inpatient medically necessary services. **        --------------  CONTINUED STAY REVIEW  9/13-9/16  Payor: [employer]  Subscriber #:  [id number]  Authorization Number: [id number]    Admit date: 9/12/24  Admit time:  8:46 PM    REVIEW DOCUMENTATION:    9/13    Chief Complaint: AMS        Subjective:  Patient less confused, back to baseline per . No diarrhea. Complains abd distended           Objective:  Review of Systems:   A comprehensive review of systems was completed; pertinent positive and negatives stated in subjective.     Vital signs:  Temp:  [97.2 °F (36.2 °C)-97.5 °F (36.4 °C)] 97.4 °F (36.3 °C)  Pulse:  [73-91] 79  Resp:  [12-20] 18  BP: ()/(48-69) 101/48  SpO2:  [92 %-99 %] 97 %     Physical Exam:    General: No acute distress  Respiratory: No wheezes, no rhonchi  Cardiovascular: S1, S2, regular rate and rhythm  Abdomen: Soft, Non-tender, distended, positive bowel sounds  Neuro: No new focal deficits.   Extremities: No edema        Diagnostic Data:    Labs:       Recent Labs   Lab 09/12/24  1713 09/13/24  0439   WBC 8.7 7.9   HGB 11.2* 9.2*   MCV 94.0 95.0   .0 136.0*   INR 1.71*  --               Recent Labs   Lab 09/12/24  1713 09/13/24  0438   * 107*   BUN 25* 21   CREATSERUM 1.99* 1.88*   CA 9.8 9.3   ALB 2.5* 2.0*   * 134*   K 4.1 3.1*    104   CO2 19.0* 19.0*   ALKPHO 141 116   AST 71* 31   ALT 17 14   BILT 4.2* 3.5*   TP 7.4 6.0         Estimated Creatinine Clearance: 17.9 mL/min (A) (based on SCr of 1.88 mg/dL (H)).         Recent Labs   Lab 09/12/24  1713   TROPHS 27             Recent Labs   Lab 09/12/24 1713   PTP 20.3*   INR 1.71*                      Microbiology     No results found for this visit on 09/12/24.        Imaging: Reviewed in Epic.     Medications:   Scheduled Medications    lactulose  20 g Oral TID    ceFAZolin  1 g Intravenous Q12H    heparin  5,000 Units Subcutaneous 2 times per day                  Assessment & Plan:  #Hepatic encephalopathy  -Lactulose     #Debility  -2/2 above  -therapies to see     #Alcohol-induced cirrhosis  #Ascites  -PTA: Spironolactone/Lasix (family reports non compliance)  -recurrent hospitalizations, palliative care to see  -US para for ascities     #Hyponatremia  #NAGMA  #CKD3  -dc iv hydration      #Abnormal cxr  -s/p iv abx in ER, pt has cough but improving, pt has known left lobe opacity from pna in aug, for now will hold further pna abx      #Macrocytic anemia    #Pyuria  -unclear if pt having symptoms  -Empiric Ancef for now  -fu UCx     #Recent hospitalization  -8/3-8/7 where patient was admitted for acute respiratory failure secondary to pneumonia.          9/14    Chief Complaint: AMS        Subjective:  Patient less confused, back to baseline per . No diarrhea. Complains abd distended           Objective:  Review of Systems:   A comprehensive review of systems was completed; pertinent positive and negatives stated in subjective.     Vital signs:  Temp:  [97.2 °F (36.2 °C)-97.7 °F (36.5 °C)] 97.5 °F (36.4 °C)  Pulse:  [75-89] 89  Resp:  [18] 18  BP: (103-121)/(51-78) 121/78  SpO2:  [93 %-97 %] 93 %     Physical Exam:    General: No acute distress  Respiratory: No wheezes, no rhonchi  Cardiovascular: S1, S2, regular rate and rhythm  Abdomen: Soft, Non-tender, distended, positive bowel sounds  Neuro: No new focal deficits.   Extremities: No edema        Diagnostic Data:    Labs:       Recent Labs   Lab 09/12/24 1713 09/13/24  0439   WBC 8.7 7.9   HGB 11.2* 9.2*   MCV 94.0 95.0   .0 136.0*   INR 1.71*  --                Recent Labs   Lab 09/12/24 1713 09/13/24  0438 09/14/24  0801   GLU  105* 107*  --    BUN 25* 21  --    CREATSERUM 1.99* 1.88*  --    CA 9.8 9.3  --    ALB 2.5* 2.0*  --    * 134*  --    K 4.1 3.1* 3.2*    104  --    CO2 19.0* 19.0*  --    ALKPHO 141 116  --    AST 71* 31  --    ALT 17 14  --    BILT 4.2* 3.5*  --    TP 7.4 6.0  --          Estimated Creatinine Clearance: 17.9 mL/min (A) (based on SCr of 1.88 mg/dL (H)).         Recent Labs   Lab 09/12/24  1713   TROPHS 27             Recent Labs   Lab 09/12/24  1713   PTP 20.3*   INR 1.71*                     Microbiology           Hospital Encounter on 09/12/24   1. Urine Culture, Routine     Status: None     Collection Time: 09/12/24  5:13 PM     Specimen: Urine, clean catch   Result Value Ref Range     Urine Culture No Growth at 18-24 hrs. N/A   2. Blood Culture     Status: None (Preliminary result)     Collection Time: 09/12/24  5:13 PM     Specimen: Blood,peripheral   Result Value Ref Range     Blood Culture Result No Growth 1 Day N/A            Imaging: Reviewed in Epic.     Medications:   Scheduled Medications    lactulose  20 g Oral TID    heparin  5,000 Units Subcutaneous 2 times per day                  Assessment & Plan:  #Hepatic encephalopathy  -Lactulose     #Debility  -2/2 above  -therapies to see     #Alcohol-induced cirrhosis  #Ascites  -PTA: Spironolactone/Lasix (family reports non compliance)  -recurrent hospitalizations, palliative care to see  -US para for ascities today   -diuretics started  -monitior BP. May need midodrine  -palliative  -orthostatics      #Hyponatremia  #NAGMA  #CKD3  -dc iv hydration      #Abnormal cxr  -s/p iv abx in ER, pt has cough but improving, pt has known left lobe opacity from pna in aug, for now will hold further pna abx      #Macrocytic anemia  -considering EGD per GI     #Pyuria  -unclear if pt having symptoms  -Empiric Ancef for now  -fu UCx     #Recent hospitalization  -8/3-8/7 where patient was admitted for acute respiratory failure secondary to pneumonia.          9/15    Chief Complaint: AMS        Subjective:  Patient less confused, back to baseline per . No diarrhea. Complains abd distended           Objective:  Review of Systems:   A comprehensive review of systems was completed; pertinent positive and negatives stated in subjective.     Vital signs:  Temp:  [97.1 °F (36.2 °C)-98.1 °F (36.7 °C)] 97.7 °F (36.5 °C)  Pulse:  [] 78  Resp:  [16-18] 17  BP: (107-130)/(58-82) 114/65  SpO2:  [93 %-95 %] 95 %     Physical Exam:    General: No acute distress  Respiratory: No wheezes, no rhonchi  Cardiovascular: S1, S2, regular rate and rhythm  Abdomen: Soft, Non-tender, distended, positive bowel sounds  Neuro: No new focal deficits.   Extremities: No edema        Diagnostic Data:    Labs:        Recent Labs   Lab 09/12/24 1713 09/13/24  0439 09/15/24  0649   WBC 8.7 7.9 7.0   HGB 11.2* 9.2* 8.7*   MCV 94.0 95.0 95.0   .0 136.0* 141.0*   INR 1.71*  --   --                 Recent Labs   Lab 09/12/24 1713 09/13/24  0438 09/14/24  0801 09/15/24  0649   * 107*  --  88   BUN 25* 21  --  22   CREATSERUM 1.99* 1.88*  --  1.92*   CA 9.8 9.3  --  9.4   ALB 2.5* 2.0*  --  2.0*   * 134*  --  137   K 4.1 3.1* 3.2* 3.3*  3.3*    104  --  107   CO2 19.0* 19.0*  --  21.0   ALKPHO 141 116  --  107   AST 71* 31  --  34*   ALT 17 14  --  <7*   BILT 4.2* 3.5*  --  2.9*   TP 7.4 6.0  --  5.6*         Estimated Creatinine Clearance: 17.6 mL/min (A) (based on SCr of 1.92 mg/dL (H)).         Recent Labs   Lab 09/12/24  1713   TROPHS 27             Recent Labs   Lab 09/12/24  1713   PTP 20.3*   INR 1.71*                     Microbiology           Hospital Encounter on 09/12/24   1. Urine Culture, Routine     Status: None     Collection Time: 09/12/24  5:13 PM     Specimen: Urine, clean catch   Result Value Ref Range     Urine Culture No Growth at 18-24 hrs. N/A   2. Blood Culture     Status: None (Preliminary result)     Collection Time: 09/12/24  5:13 PM      Specimen: Blood,peripheral   Result Value Ref Range     Blood Culture Result No Growth 2 Days N/A            Imaging: Reviewed in Epic.     Medications:   Scheduled Medications    [START ON 9/16/2024] albumin human  25 g Intravenous Once    lactulose  20 g Oral TID    heparin  5,000 Units Subcutaneous 2 times per day                  Assessment & Plan:  #Hepatic encephalopathy  -Lactulose     #Debility  -2/2 above  -therapies to see     #Alcohol-induced cirrhosis  #Ascites  -PTA: Spironolactone/Lasix (family reports non compliance)  -recurrent hospitalizations, palliative care to see  -US para for ascities tomorrow   -diuretics started. D/w GI today   -monitior BP. May need midodrine  -palliative  -orthostatics after para     #Hyponatremia  #NAGMA  #CKD3  -dc iv hydration      #Abnormal cxr  -s/p iv abx in ER, pt has cough but improving, pt has known left lobe opacity from pna in aug, for now will hold further pna abx      #Macrocytic anemia  -considering EGD per GI     #Pyuria  -ruled out UTI  -DC Empiric Ancef   -Ucx neg     #Recent hospitalization  -8/3-8/7 where patient was admitted for acute respiratory failure secondary to pneumonia.           Vito Rodriguez MD           Supplementary Documentation:  Quality:  DVT Mechanical Prophylaxis:   SCDs,        DVT Pharmacologic Prophylaxis   Medication    heparin (Porcine) 5000 UNIT/ML injection 5,000 Units                 Code Status: DNAR/Selective Treatment  Street: External urinary catheter in place  Street Duration (in days):   Central line:    HEIKE:      Discharge is dependent on: course  At this point Ms. Howe is expected to be discharge to: home     The 21st Century Cures Act makes medical notes like these available to patients in the interest of transparency. Please be advised this is a medical document. Medical documents are intended to carry relevant information, facts as evident, and the clinical opinion of the practitioner. The medical note is intended as  peer to peer communication and may appear blunt or direct. It is written in medical language and may contain abbreviations or verbiage that are unfamiliar.      Dietitian Malnutrition Assessment     Evaluation for Malnutrition: Criteria for non-severe malnutrition diagnosis-         acute illness/injury related to Energy intake less than 75% for greater than 7 days., Fluid accumulation mild.          RD Malnutrition Care Plan: See RD nutrition assessment for additional recommendations.     Body Fat/Muscle Mass:           Physician Assessment      Patient has a diagnosis of moderate malnutrition          9/16  PROCEDURE:       US PARACENTESIS W IMAGING  (CPT=49083)     COMPARISON:    [facility] , US, US PARACENTESIS W IMAGING (CPT=49083), 8/06/2024, 9:56 AM.  [facility] , XR, XR CHEST AP PORTABLE  (CPT=71045), 9/12/2024, 5:23 PM.     INDICATIONS:     ascites     DESCRIPTION OF PROCEDURE:    The risks, benefits, and alternatives of the procedure were explained to the patient and informed written and verbal consent was documented in the chart. Time out was performed by the staff. Potential complications including bleeding and infection were discussed with the patient and they related understanding. After obtaining informed consent, an ultrasound-guided paracentesis was performed in the usual sterile manner. The right lower quadrant abdominal wall was prepped and draped in sterile fashion and 1% lidocaine was used for local anesthetic.     LOCATION:Right lower quadrant  FLUID REMOVED:3 L of straw-colored ascites  MEDICATION:10 cc of 1% Lidocaine for local anesthetic.  COMPLICATIONS:None.  LABORATORY:Specimen submitted to pathology for further analysis.     CONCLUSION:Ultrasound-guided paracentesis was performed without complication.             Medications 09/10/24 09/11/24 09/12/24 09/13/24 09/14/24 09/15/24 09/16/24   albumin human (Albumin) 25% injection 25 g  Dose: 25 g  Freq: Once Route: IV  Indications of Use:  ASCITES  Start: 09/16/24 1000 End: 09/16/24 0946   Admin Instructions:   For I.V. administration only. Use within 4 hours after opening vial; discard unused portion. In emergencies, may administer as rapidly as necessary to improve clinical condition. After initial volume replacement do not exceed 1 mL/minute in patients with normal plasma volume; 2-3 mL/minute in patients with hypoproteinemia.   Order specific questions:             0946 HB-New Bag        albumin human (Albumin) 5% injection 12.5 g  Dose: 12.5 g  Freq: Once Route: IV  Last Dose: Stopped (09/13/24 1500)  Start: 09/13/24 1215 End: 09/13/24 1500   Admin Instructions:   For IV administration only.  Use within 4 hours.  Discard unused portion.  In emergencies, may administer as rapidly as necessary to improve clinical condition.  After initial volume replacement do not exceed 2-4 ml/minute in patients with normal plasma volume; 5-10 ml/minute in patients with hypoproteinemia.   Order specific questions:          1223 RV-New Bag     1500 SG-Stopped           ceFAZolin (Ancef) 1 g in dextrose 5% 100mL IVPB-ADD  Dose: 1 g  Freq: Every 12 hours Route: IV  Last Dose: 1 g (09/14/24 0848)  Start: 09/12/24 2200 End: 09/14/24 1051   Order specific questions:         2203 MARCELLE-New Bag      0812 RV-New Bag     2018 MARCELLE-New Bag      0848 CL-New Bag     1051-D/C'd        furosemide (Lasix) tab 20 mg  Dose: 20 mg  Freq: Daily Route: OR  Start: 09/16/24 1045          1217 HB-Given        furosemide (Lasix) tab 20 mg  Dose: 20 mg  Freq: Daily Route: OR  Start: 09/15/24 1130 End: 09/15/24 1243         1147 HB-Given     1243-D/C'd       heparin (Porcine) 5000 UNIT/ML injection 5,000 Units  Dose: 5,000 Units  Freq: 2 times per day Route: SC  Start: 09/12/24 2100      2203 MARCELLE-Given      0812 RV-Given     2018 MARCELLE-Given      0848 CL-Given     2017 CK-Given      0820 HB-Given     2013 SW-Given      (0900 HB)-Not Given     2100        lactulose (ENULOSE) solution 20 g  Dose: 20  g  Freq: 3 times daily Route: OR  Start: 09/12/24 2100   Admin Instructions:   Hold remaining dose after 3 loose stools for the day       (2100 MARCELLE)-Not Given [C]      0812 RV-Given     1633 RV-Given     2100 MARCELLE-Hold [C]      0847 CL-Given     1510 CL-Given     2017 CK-Given      0820 HB-Given     1557 HB-Given     2013 SW-Given      0815 HB-Given     1600     2100        lactulose (ENULOSE) solution 20 g  Dose: 20 g  Freq: Once Route: OR  Start: 09/12/24 1815 End: 09/12/24 1929      1929 SM-Given [C]            magnesium oxide (Mag-Ox) tab 400 mg  Dose: 400 mg  Freq: Once Route: OR  Start: 09/13/24 0645 End: 09/13/24 0812 0812 RV-Given           magnesium sulfate in sterile water for injection 2 g/50mL IVPB premix 2 g  Dose: 2 g  Freq: Once Route: IV  Last Dose: 2 g (09/14/24 1102)  Start: 09/14/24 1030 End: 09/14/24 1202        1102 CL-New Bag          piperacillin-tazobactam (Zosyn) 4.5 g in dextrose 5% 100 mL IVPB-ADDV  Dose: 4.5 g  Freq: Once Route: IV  Last Dose: Stopped (09/12/24 1941)  Start: 09/12/24 1859 End: 09/12/24 1941   Admin Instructions:   Incompatible with Lactated Ringers (LR)   Order specific questions:         1911 ERYN-New Bag     1918 ERYN-Handoff     1941 SM-Stopped            potassium chloride (Klor-Con M20) tab 40 mEq  Dose: 40 mEq  Freq: Once Route: OR  Start: 09/15/24 0800 End: 09/15/24 0820   Admin Instructions:   Do not crush         0820 HB-Given         potassium chloride (Klor-Con M20) tab 40 mEq  Dose: 40 mEq  Freq: Once Route: OR  Start: 09/13/24 0645 End: 09/13/24 0812   Admin Instructions:   Do not crush       0812 RV-Given           potassium chloride 40 mEq in 250mL sodium chloride 0.9% IVPB premix  Dose: 40 mEq  Freq: Once Route: IV  Last Dose: 40 mEq (09/14/24 1232)  Start: 09/14/24 1030 End: 09/14/24 1632        1232 CL-New Bag          spironolactone (Aldactone) tab 50 mg  Dose: 50 mg  Freq: Daily Route: OR  Start: 09/16/24 1045   Admin Instructions:   CAUTION: HAZARDOUS  DRUG          1217 HB-Given        spironolactone (Aldactone) tab 50 mg  Dose: 50 mg  Freq: Daily Route: OR  Start: 09/15/24 1130 End: 09/15/24 1243   Admin Instructions:   CAUTION: HAZARDOUS DRUG         1147 HB-Given     1243-D/C'd                      sodium chloride 0.9% infusion  Rate: 75 mL/hr  Freq: Continuous Route: IV  Last Dose: Stopped (09/13/24 1028)  Start: 09/12/24 2100 End: 09/13/24 1004 2205 MARCELLE-New Bag      1004-D/C'd  1028 SG-Stopped           sodium chloride 0.9% infusion  Rate: 125 mL/hr Dose: 125 mL/hr  Freq: Continuous Route: IV  Last Dose: 125 mL/hr (09/12/24 1911)  Start: 09/12/24 1907 End: 09/12/24 2036 1911 ERYN-New Bag     1919 ERYN-Handoff     2036-D/C'd  2043 SM-Handoff                  Vitals (last day)       Date/Time Temp Pulse Resp BP SpO2 Weight O2 Device O2 Flow Rate (L/min) [facility]    09/16/24 1211 97.9 °F (36.6 °C) 79 18 106/58 96 % -- None (Room air) --     09/16/24 0715 97.7 °F (36.5 °C) 78 18 104/62 93 % -- None (Room air) --     09/16/24 0311 97.9 °F (36.6 °C) -- 18 111/60 -- -- None (Room air) --     09/16/24 0038 97.6 °F (36.4 °C) -- 18 109/94 -- -- None (Room air) --     09/15/24 1941 97.8 °F (36.6 °C) 84 17 108/61 100 % -- -- -- IS    09/15/24 1100 97.7 °F (36.5 °C) 78 17 114/65 95 % -- None (Room air) --     09/15/24 0744 97.1 °F (36.2 °C) 81 16 118/63 95 % -- None (Room air) --     09/15/24 0421 98.1 °F (36.7 °C) 82 18 116/61 94 % -- None (Room air) 0 L/min MA    09/15/24 0014 97.9 °F (36.6 °C) 79 18 110/64 93 % -- None (Room air) 0 L/min MA            09/13/24 1123 97.3 °F (36.3 °C) 78 18 88/58 Abnormal  97 % -- None (Room air) -- ND

## 2024-09-16 NOTE — PROGRESS NOTES
[facility]   part of [facility]  Palliative Care Progress Note    Gayla Howe Patient Status:  Inpatient    1940 MRN [id number]   Location [facility] 5NW-A Attending Vito Rodriguez MD   Hosp Day # 4 PCP Mina Correa MD     History/Other:  history of cryptogenic cirrhosis, ascites, macrocytic anemia, hypertension  who was admitted on 2024 for weakness and poor appetite. She was recently hospitalized in August for pneumonia, diagnosed with cirrhosis s/p paracentesis.  Work up in our hospital revealed encephalopathy, AMIRA, hypoalbuminemia, elevated proBNP  and hyponatremia.      Allergies:  Allergies   Allergen Reactions    Codeine NAUSEA ONLY     Medications:     Current Facility-Administered Medications:     furosemide (Lasix) tab 20 mg, 20 mg, Oral, Daily    spironolactone (Aldactone) tab 50 mg, 50 mg, Oral, Daily    lactulose (ENULOSE) solution 20 g, 20 g, Oral, TID    acetaminophen (Tylenol Extra Strength) tab 500 mg, 500 mg, Oral, Q4H PRN    melatonin tab 3 mg, 3 mg, Oral, Nightly PRN    polyethylene glycol (PEG 3350) (Miralax) 17 g oral packet 17 g, 17 g, Oral, Daily PRN    sennosides (Senokot) tab 17.2 mg, 17.2 mg, Oral, Nightly PRN    bisacodyl (Dulcolax) 10 MG rectal suppository 10 mg, 10 mg, Rectal, Daily PRN    ondansetron (Zofran) 4 MG/2ML injection 4 mg, 4 mg, Intravenous, Q6H PRN    metoclopramide (Reglan) 5 mg/mL injection 5 mg, 5 mg, Intravenous, Q8H PRN    heparin (Porcine) 5000 UNIT/ML injection 5,000 Units, 5,000 Units, Subcutaneous, 2 times per day    Subjective      Interval events: no acute events overnight.   She had a paracentesis this morning, 3 L removed.     Review of Systems:  Dyspnea: denies  Cough: denies  Nausea: denies  Appetite:  poor, eating a few bites of food.     Bowel Movement         2024  0047             Stool Count Calculated for I/O: 1            Objective:     Vital Signs:  Blood pressure 106/58, pulse 79, temperature 97.9 °F (36.6 °C),  temperature source Oral, resp. rate 18, height 5' 2\" (1.575 m), weight 141 lb 4.8 oz (64.1 kg), SpO2 96%.  Body mass index is 25.84 kg/m².        Performance scale: 60%  % Ambulation Activity Level Self-Care Intake Consciousness   100 Full  Normal  No Disease Full Normal Full   90 Full  Normal  Some Disease Full Normal Full   80 Full  Normal w/effort  Some Disease Full Normal or reduced Full   70 Reduced  Can't Perform Job  Some Disease Full Normal or reduced Full   60 Reduced  Can't Perform Hobby   Significant Disease Occ Assist Normal or reduced Full or confused   50 Mainly sit/lie Can't do any work  Extensive Disease Partial Assist Normal or reduced Full or confused   40 Mainly in bed Can't do any work  Extensive Disease Mainly Assist Normal or reduced Full or confused   30 Bed Bound Can't do any work  Extensive Disease Max Assist  Total Care Reduced  Drowsy/confused   20 Bed Bound Can't do any work  Extensive Disease Max Assist  Total Care Minimal  Drowsy/confused   10 Bed Bound Can't do any work  Extensive Disease Max Assist  Total Care Mouth Care  Drowsy/confused   0 Death          Physical Exam:  General: Alert & Awake. In no apparent respiratory distress.    Cardiac:  regular rate  Lungs: Clear on RA  Abdomen: Soft, non-tender, non-distended, normal bowel sounds X 4 quadrants   Extremities: + BLE edema present  Neurologic: Alert and oriented to person, place, time, situation   Psychiatric: Mood - pleasant   Skin: Warm and dry.        Results:     Hematology:  Lab Results   Component Value Date    WBC 7.0 09/15/2024    HGB 8.7 (L) 09/15/2024    HCT 24.9 (L) 09/15/2024    .0 (L) 09/15/2024     Coags:  Lab Results   Component Value Date    INR 2.45 (H) 09/16/2024     Chemistry:  Lab Results   Component Value Date    CREATSERUM 1.95 (H) 09/16/2024    BUN 22 09/16/2024     (L) 09/16/2024    K 3.6 09/16/2024    K 3.6 09/16/2024     09/16/2024    CO2 22.0 09/16/2024    GLU 91 09/16/2024    CA 9.9  09/16/2024    ALB 2.0 (L) 09/15/2024    ALKPHO 107 09/15/2024    BILT 2.9 (H) 09/15/2024    TP 5.6 (L) 09/15/2024    AST 34 (H) 09/15/2024    ALT <7 (L) 09/15/2024    MG 2.0 09/15/2024     Imaging:  US PARACENTESIS W IMAGING (CPT=49083)    Result Date: 9/16/2024  CONCLUSION:  Ultrasound-guided paracentesis was performed without complication.   LOCATION:  [facility]     Dictated by (CST): Barney Gillette MD on 9/16/2024 at 11:46 AM     Finalized by (CST): Barney Gillette MD on 9/16/2024 at 11:47 AM           Summary of Discussion : Hugo/ yvan, Fausto and Gayla were present today to discuss GOC.   -Discussed her mentation, overall at her baseline although has moments of forgetfulness. Discussed the importance of lactulose.   -Tolerated paracentesis with 3 L removed. Discussed potential outpatient paracentesis going forward and provided education on a pleurx.   -Family inquired about SAMMIE. P.T. to see this afternoon. Gayla herself while in bed easily was moving her legs, when asked to do so. -Encouraged her to be self motivated.   -SW updated to assist in caregiver support lists for home.   -Gayla herself is aware she has to take the medications for cirrhosis or she will return to the hospital and or have a shorter life expectancy.   -Nutrition discussed and stressed protein.     Advance Care Planning counseling and discussion: DNAR selective treatment.     DNAR ( do not resuscitate) indicates if the heart stops and no spontaneous breaths end of life is present, \" allow natural death.\"    DNAR with Selective treatment is appropriate for on going medical management and treatment for new potential symptoms or complications with the exception of CPR AND INTUBATION.  DNI ( do not intubate) indicates no breathing tube will be placed for respiratory distress or if no spontaneous breaths.      HC POA  surrogate : Healthcare Agent's Name: Fausto RYAN FORM Completed--Document in Epic  DNAR/Selective Treatment    Principal  Problem:    Hepatic encephalopathy (HCC)  Active Problems:    Pneumonia due to infectious organism, unspecified laterality, unspecified part of lung    Palliative care encounter    Goals of care, counseling/discussion    Other ascites      Assessment and Recommendations   Goals of care:    Continue treatment focused medical management.   Medication compliance discussed  Code Status: DNAR/Selective Treatment  Healthcare Agent's Name: Fausto Howe      Discussed today's visit with Lazara Ohara RN    Palliative Care Follow Up: Palliative care team will Continue to follow while inpatient.  Palliative care follow up outpatient: is indicated and community palliative care ordered.    Thank you for allowing Palliative Care services to participate in the care of Gayla Howe.    A total of 50 minutes were spent on this follow up, which included all of the following: chart review, direct face to face contact,  physical examination, counseling, coordinating care and documentation.     Kassandra Wood, TRI  9/16/2024   2:12 PM   Palliative Care Services    The 21st Century Cures Act makes medical notes like these available to patients in the interest of transparency. Please be advised this is a medical document. Medical documents are intended to carry relevant information, facts as evident, and the clinical opinion of the practitioner. The medical note is intended as peer to peer communication and may appear blunt or direct. It is written in medical language and may contain abbreviations or verbiage that are unfamiliar.

## 2024-09-16 NOTE — PLAN OF CARE
Pt from home w/ spouse, dc planning needed  Aox3-4, can be forgetful  VSS on RA  afebrile  NSR on tele, receiving Heparin  Electrolyte non-cardiac replacement  Orthos qshift  Incontinent, brief and purewick in place  Up x2 w/ walker, bed alarm on and call light within reach  Cardiac diet  Receiving Lactulos  Pt/family updated on current POC, no questions at this time    Problem: Patient/Family Goals  Goal: Patient/Family Long Term Goal  Description: Patient's Long Term Goal:   Discharge with adequate resources      Interventions:  - Follow care plan   - See additional Care Plan goals for specific interventions  Outcome: Progressing  Goal: Patient/Family Short Term Goal  Description: Patient's Short Term Goal:   Decrease ammonia levels   9/13 noc: get some rest  9/14am: remain comfortable   9/15 AM: get washed up, go on a walk    Interventions:   - Medication per MAR   - See additional Care Plan goals for specific interventions  Outcome: Progressing     Problem: PAIN - ADULT  Goal: Verbalizes/displays adequate comfort level or patient's stated pain goal  Description: INTERVENTIONS:  - Encourage pt to monitor pain and request assistance  - Assess pain using appropriate pain scale  - Administer analgesics based on type and severity of pain and evaluate response  - Implement non-pharmacological measures as appropriate and evaluate response  - Consider cultural and social influences on pain and pain management  - Manage/alleviate anxiety  - Utilize distraction and/or relaxation techniques  - Monitor for opioid side effects  - Notify MD/LIP if interventions unsuccessful or patient reports new pain  - Anticipate increased pain with activity and pre-medicate as appropriate  Outcome: Progressing     Problem: RISK FOR INFECTION - ADULT  Goal: Absence of fever/infection during anticipated neutropenic period  Description: INTERVENTIONS  - Monitor WBC  - Administer growth factors as ordered  - Implement neutropenic  guidelines  Outcome: Progressing     Problem: SAFETY ADULT - FALL  Goal: Free from fall injury  Description: INTERVENTIONS:  - Assess pt frequently for physical needs  - Identify cognitive and physical deficits and behaviors that affect risk of falls.  - [place] fall precautions as indicated by assessment.  - Educate pt/family on patient safety including physical limitations  - Instruct pt to call for assistance with activity based on assessment  - Modify environment to reduce risk of injury  - Provide assistive devices as appropriate  - Consider OT/PT consult to assist with strengthening/mobility  - Encourage toileting schedule  Outcome: Progressing     Problem: DISCHARGE PLANNING  Goal: Discharge to home or other facility with appropriate resources  Description: INTERVENTIONS:  - Identify barriers to discharge w/pt and caregiver  - Include patient/family/discharge partner in discharge planning  - Arrange for needed discharge resources and transportation as appropriate  - Identify discharge learning needs (meds, wound care, etc)  - Arrange for interpreters to assist at discharge as needed  - Consider post-discharge preferences of patient/family/discharge partner  - Complete POLST form as appropriate  - Assess patient's ability to be responsible for managing their own health  - Refer to Case Management Department for coordinating discharge planning if the patient needs post-hospital services based on physician/LIP order or complex needs related to functional status, cognitive ability or social support system  Outcome: Progressing     Problem: Altered Communication/Language Barrier  Goal: Patient/Family is able to understand and participate in their care  Description: Interventions:  - Assess communication ability and preferred communication style  - Implement communication aides and strategies  - Use visual cues when possible  - Listen attentively, be patient, do not interrupt  - Minimize distractions  - Allow  time for understanding and response  - Establish method for patient to ask for assistance (call light)  - Provide an  as needed  - Communicate barriers and strategies to overcome with those who interact with patient  Outcome: Progressing

## 2024-09-17 ENCOUNTER — APPOINTMENT (OUTPATIENT)
Dept: GENERAL RADIOLOGY | Facility: HOSPITAL | Age: 84
End: 2024-09-17
Attending: HOSPITALIST
Payer: MEDICARE

## 2024-09-17 VITALS
OXYGEN SATURATION: 94 % | BODY MASS INDEX: 25.95 KG/M2 | DIASTOLIC BLOOD PRESSURE: 61 MMHG | TEMPERATURE: 98 F | RESPIRATION RATE: 18 BRPM | HEART RATE: 93 BPM | WEIGHT: 141 LBS | HEIGHT: 62 IN | SYSTOLIC BLOOD PRESSURE: 100 MMHG

## 2024-09-17 LAB
ANION GAP SERPL CALC-SCNC: 11 MMOL/L (ref 0–18)
BUN BLD-MCNC: 18 MG/DL (ref 9–23)
CALCIUM BLD-MCNC: 9.9 MG/DL (ref 8.7–10.4)
CHLORIDE SERPL-SCNC: 106 MMOL/L (ref 98–112)
CO2 SERPL-SCNC: 19 MMOL/L (ref 21–32)
CREAT BLD-MCNC: 2.01 MG/DL
EGFRCR SERPLBLD CKD-EPI 2021: 24 ML/MIN/1.73M2 (ref 60–?)
GLUCOSE BLD-MCNC: 108 MG/DL (ref 70–99)
OSMOLALITY SERPL CALC.SUM OF ELEC: 284 MOSM/KG (ref 275–295)
POTASSIUM SERPL-SCNC: 3.7 MMOL/L (ref 3.5–5.1)
SODIUM SERPL-SCNC: 136 MMOL/L (ref 136–145)

## 2024-09-17 PROCEDURE — 99239 HOSP IP/OBS DSCHRG MGMT >30: CPT | Performed by: HOSPITALIST

## 2024-09-17 PROCEDURE — 73560 X-RAY EXAM OF KNEE 1 OR 2: CPT | Performed by: HOSPITALIST

## 2024-09-17 PROCEDURE — 99232 SBSQ HOSP IP/OBS MODERATE 35: CPT | Performed by: NURSE PRACTITIONER

## 2024-09-17 PROCEDURE — 99232 SBSQ HOSP IP/OBS MODERATE 35: CPT | Performed by: INTERNAL MEDICINE

## 2024-09-17 RX ORDER — FUROSEMIDE 20 MG
20 TABLET ORAL DAILY
Qty: 30 TABLET | Refills: 0 | Status: SHIPPED | OUTPATIENT
Start: 2024-09-17 | End: 2024-10-17

## 2024-09-17 RX ORDER — SPIRONOLACTONE 50 MG/1
50 TABLET, FILM COATED ORAL DAILY
Qty: 30 TABLET | Refills: 0 | Status: SHIPPED | OUTPATIENT
Start: 2024-09-17 | End: 2024-10-17

## 2024-09-17 RX ORDER — MULTIPLE VITAMINS W/ MINERALS TAB 9MG-400MCG
1 TAB ORAL DAILY
Status: DISCONTINUED | OUTPATIENT
Start: 2024-09-18 | End: 2024-09-17

## 2024-09-17 NOTE — CM/SW NOTE
Met with patient, , daughter, and son at bedside to follow up on discharge planning. Reviewed PT/OT notes from today, explained insurance would likely deny patient for SAMIME as she is CGA/SBA for everything except lower body dressing with  states he typically helps her with at baseline. Again reviewed pros and cons of SAMMIE. Their main concerns is going to the bathroom, offered commode however she already has one at home. Discussed caregiver assistance, they confirmed they still have cg list and [facility] contact. Confirmed plan for patient to return home with [facility] and informed them SW added order for RN/PT/OT. Will send AVS to HH once available.     Informed [facility] of discharge today.     SW/CM to remain available for support and/or discharge planning.    LUPE Lowery  Discharge Planner  [phone]

## 2024-09-17 NOTE — PHYSICAL THERAPY NOTE
PHYSICAL THERAPY TREATMENT NOTE - INPATIENT    Room Number: 501/501-A     Session: 2      Number of Visits to Meet Established Goals: 3    Presenting Problem: hepatic encephalopathy  Co-Morbidities : cirrhotic liver    ASSESSMENT   Patient demonstrates fair progress this session, goals remain in progress.  Patient self limited distance of gait and activity. Needs encouragement. Observed ability to full WB on LLE without antalgic gait. Patient states her knee hurts from skin abrasion.     Patient continues to function near baseline with transfers and gait. Contributing factors to remaining limitations include decreased functional strength, decreased endurance/aerobic capacity, and decreased muscular endurance.  Next session anticipate patient to progress transfers and gait.  Physical Therapy will continue to follow patient for duration of hospitalization.    Patient continues to benefit from continued skilled PT services: at discharge to promote prior level of function and safety with additional support and return home with home health PT.  confirms that pt lives a sedanatory life style at baseline.     PLAN  PT Treatment Plan: Bed mobility;Body mechanics;Endurance;Energy conservation;Patient education;Family education;Gait training;Strengthening;Stoop training;Stair training;Transfer training;Balance training  Rehab Potential : Fair  Frequency (Obs):  (2-3x/week)    CURRENT GOALS     Goal #1 Patient is able to demonstrate supine - sit EOB @ level: supervision       Goal #2 Patient is able to demonstrate transfers Sit to/from Stand at assistance level: minimum assistance  MET     Goal #3 Patient is able to ambulate 50 feet with assist device: walker - rolling at assistance level: minimum assistance  MET  Updated to amb 50' with RW and sup.     Goal #4    Goal #5    Goal #6    Goal Comments: Goals established on 9/13/2024 9/17/2024 see above for  ongoing,  goal 2 and 3 achieved.    SUBJECTIVE  \" I am  okay.\"    OBJECTIVE  Precautions: Bed/chair alarm    WEIGHT BEARING RESTRICTION  Weight Bearing Restriction: None                PAIN ASSESSMENT   Ratin  Location: Denied  Management Techniques: Activity promotion    BALANCE                                                                                                                       Static Sitting: Good  Dynamic Sitting: Good           Static Standing: Fair -  Dynamic Standing: Poor +    ACTIVITY TOLERANCE         BP monitored during session     AM-PAC '6-Clicks' INPATIENT SHORT FORM - BASIC MOBILITY  How much difficulty does the patient currently have...  Patient Difficulty: Turning over in bed (including adjusting bedclothes, sheets and blankets)?: None   Patient Difficulty: Sitting down on and standing up from a chair with arms (e.g., wheelchair, bedside commode, etc.): A Little   Patient Difficulty: Moving from lying on back to sitting on the side of the bed?: A Little   How much help from another person does the patient currently need...   Help from Another: Moving to and from a bed to a chair (including a wheelchair)?: A Little   Help from Another: Need to walk in hospital room?: A Little   Help from Another: Climbing 3-5 steps with a railing?: Total       AM-PAC Score:  Raw Score: 17   Approx Degree of Impairment: 50.57%   Standardized Score (AM-PAC Scale): 42.13   CMS Modifier (G-Code): CK    FUNCTIONAL ABILITY STATUS  Gait Assessment   Functional Mobility/Gait Assessment  Gait Assistance: Contact guard assist  Distance (ft): 10x1,12x1  Assistive Device: Rolling walker  Pattern: Shuffle    Skilled Therapy Provided    Bed Mobility:  Rolling: sup   Supine<>Sit: sup from head of bed elevated, needs cues for use of ue's.    Sit<>Supine: NT     Transfer Mobility:  Sit<>Stand: SBA from bed four times. SBA from toilet with wall rail use. Effort required from low toilet.    Stand<>Sit: SBA   Gait: CGA to SBA and RW. Patient preferred to wear ill fitting  lose sleepers, tends to shuffle her feet. Forward flexed posture. Patient adamantly declined to ambulate further than the bathroom. Patient amb to<>from bathroom with cga to SBA. Full wb tolerated on LLE without any antalgic pattern.     Therapist's Comments: Patient was received in supine.  reported of pt's fall last night when pt amb across the room to fetch the walker to return to bed after being tired from sitting. Pt shows decreased insight in her safety awareness.   Pt states her knee hurts, RN was made aware. Pt able to perform hip abd/add, SLR and knee flexion with assist.   Patient was received soiled, amb to bathroom, able to push her brief down but assist needed for adolfo care. Patient maintained standing for 1 min three times for cleaning, brief changed. PCT unavailable. Patient ambulated back to the chair and declined to amb outside in the hallway,  was present and aware.         Education provided on  Benefits of upright position  Promotion of walking with nursing staff  Body mechanics       THERAPEUTIC EXERCISES  Lower Extremity LAQ  Heel slides  Hip abd  SLR  Ankle pumps     Upper Extremity      Position Sitting and supine     Repetitions   10   Sets   1     Patient End of Session: Up in chair;Alarm set    PT Session Time: 45 minutes  Gait Trainin minutes  Therapeutic Activity: 25 minutes  Exercises 5 min

## 2024-09-17 NOTE — DIETARY MALNUTRITION NOTE
[facility]   part of [facility]  NUTRITION ASSESSMENT    Pt meets moderate malnutrition criteria at this time.    CRITERIA FOR MALNUTRITION DIAGNOSIS:  Criteria for non-severe malnutrition diagnosis: acute illness/injury related to energy intake less than 75% for greater than 7 days, fluid accumulation mild, and moderate muscle and fat wasting    NUTRITION INTERVENTION:    RD nutrition Care Plan- See RD nutrition assessment for additional recommendations  Meal and Snacks - Liberalize to 2gm Na diet as tolerated; Monitor and encourage adequate PO intake. Encouraged protein intake.  Medical Nutrition Supplements - RD added Magic Cup chocolate BID.  Vitamin and Mineral Supplementation - Recommend daily multivitamin with minerals.  Nutrition Education - High Calorie, High Protein Nutrition Therapy. Handout provided to family members.    PATIENT STATUS: 9/17: Pt had paracentesis done yesterday with ~3L removed. Received consult for \"help with high protein choices\". Visited pt at bedside with family members present. Pt with poor appetite but is slowly improving. Per family, only ate 1/2 scrambled eggs today so far. Again discussed ONS options and high encouraged pt to consider; agreeable to try chocolate Magic Cup. Denies GI symptoms at this time. Briefly discussed low sodium diet d/t ascites/cirrhosis. Provided handout on high kcal and protein diet and encouraged at home. All questions answered at this time.    9/13: 84/F admitted on 9/13 d/t low appetite, distention and low PO. Pt diagnosed with encephalopathy. Pt screened d/t MST and consult for at risk. Pt appetite low for 1-2 weeks but was svetlana to eat a little today: scrambled eggs, fruit, toast, tea.  in room answered all questions. He reported not knowing if there was wt loss and stated her normal wt being 135 lbs. Pt was offered EPHP, Magic Cup, and Ensure Clear, but refused all of them.  was told to encourage higher protein meals such as  the roasted turkey. No other complaints at this time.     PMH: cirrhosis, CKD 3, macro anemia, acute RF.    ANTHROPOMETRICS:  Ht: 157.5 cm (5' 2\")  Wt: 64 kg (141 lb).   BMI: Body mass index is 25.79 kg/m².  IBW: 50 kg      WEIGHT HISTORY:   Weight loss: No: wt can be altered d/t edema    Wt Readings from Last 10 Encounters:   09/16/24 64 kg (141 lb)   08/15/24 61.2 kg (135 lb)   08/05/24 61.2 kg (135 lb)   05/10/22 65.8 kg (145 lb)   09/28/21 65.8 kg (145 lb)   08/31/21 65.8 kg (145 lb)   08/17/21 65.8 kg (145 lb)   07/27/21 66.7 kg (147 lb)   07/23/21 67.1 kg (147 lb 14.9 oz)   02/10/20 67.1 kg (148 lb)        NUTRITION:  Diet:       Procedures    Cardiac diet Cardiac; Sodium Restriction: 2 GM NA; Is Patient on Accuchecks? No      Food Allergies: No  Cultural/Ethnic/Denominational Preferences Addressed: Yes    Percent Meals Eaten (last 3 days)       Date/Time Percent Meals Eaten (%)    09/15/24 0744 100 %            GI system review: WNL Last BM: 9/12  Skin and wounds: redness on sacrum    NUTRITION RELATED PHYSICAL FINDINGS:     1. Body Fat/Muscle Mass: moderate depletion body fat Triceps and moderate muscle depletion Temple region, Clavicle region, and Shoulder/Acromion process     2. Fluid Accumulation: lower extremity edema and b/l feet    NUTRITION PRESCRIPTION: 64.1 kg  Calories: 5481-9098 calories/day (25-30 kcal/kg)  Protein: 77-96 grams protein/day (1.2-1.5 grams protein per kg)  Fluid: ~1 ml/kcal or per MD discretion    NUTRITION DIAGNOSIS/PROBLEM:  Malnutrition related to insufficient appetite resulting in inadequate nutrition intake as evidenced by documented/reported insufficient oral intake, loss of fat mass, and loss of muscle mass      MONITOR AND EVALUATE/NUTRITION GOALS:  PO intake of 75% of meals TID - Not met, Continues  PO intake of 75% of oral nutrition supplement/s - New  Achieve and maintain dry wt +/- 1 to 2 lbs - Ongoing      MEDICATIONS:  Lasix, Lactulose, spironolactone    LABS:  Reviewed      Pt is at Moderate nutrition risk    Shweta Frost RD, LDN, CNSC  Clinical Dietitian  Spectra: [id number]

## 2024-09-17 NOTE — PROGRESS NOTES
[facility]   part of [facility]     Hospitalist Progress Note     Gayla Howe Patient Status:  Inpatient    1940 MRN [id number]   Location [facility] 5NW-A Attending Vito Rodriguez MD   Hosp Day # 4 PCP Mina Correa MD     Chief Complaint: AMS    Subjective:     Sp para. Niko lares. Abd down     Objective:    Review of Systems:   A comprehensive review of systems was completed; pertinent positive and negatives stated in subjective.    Vital signs:  Temp:  [97.6 °F (36.4 °C)-97.9 °F (36.6 °C)] 97.8 °F (36.6 °C)  Pulse:  [78-98] 98  Resp:  [17-18] 17  BP: (104-123)/(58-94) 105/71  SpO2:  [93 %-100 %] 96 %    Physical Exam:    General: No acute distress  Respiratory: No wheezes, no rhonchi  Cardiovascular: S1, S2, regular rate and rhythm  Abdomen: Soft, Non-tender, distended, positive bowel sounds  Neuro: No new focal deficits.   Extremities: No edema      Diagnostic Data:    Labs:  Recent Labs   Lab 249 09/15/24  0649 24  0808   WBC 8.7 7.9 7.0  --    HGB 11.2* 9.2* 8.7*  --    MCV 94.0 95.0 95.0  --    .0 136.0* 141.0*  --    INR 1.71*  --   --  2.45*       Recent Labs   Lab 248 24  0801 09/15/24  0649 24  0803   * 107*  --  88 91   BUN 25* 21  --  22 22   CREATSERUM 1.99* 1.88*  --  1.92* 1.95*   CA 9.8 9.3  --  9.4 9.9   ALB 2.5* 2.0*  --  2.0*  --    * 134*  --  137 134*   K 4.1 3.1* 3.2* 3.3*  3.3* 3.6  3.6    104  --  107 104   CO2 19.0* 19.0*  --  21.0 22.0   ALKPHO 141 116  --  107  --    AST 71* 31  --  34*  --    ALT 17 14  --  <7*  --    BILT 4.2* 3.5*  --  2.9*  --    TP 7.4 6.0  --  5.6*  --        Estimated Creatinine Clearance: 17.3 mL/min (A) (based on SCr of 1.95 mg/dL (H)).    Recent Labs   Lab 24  1713   TROPHS 27       Recent Labs   Lab 243 24  0808   PTP 20.3* 26.9*   INR 1.71* 2.45*                  Microbiology    Hospital Encounter on 24   1.  Urine Culture, Routine     Status: None    Collection Time: 09/12/24  5:13 PM    Specimen: Urine, clean catch   Result Value Ref Range    Urine Culture No Growth at 18-24 hrs. N/A   2. Blood Culture     Status: None (Preliminary result)    Collection Time: 09/12/24  5:13 PM    Specimen: Blood,peripheral   Result Value Ref Range    Blood Culture Result No Growth 3 Days N/A         Imaging: Reviewed in Epic.    Medications:    furosemide  20 mg Oral Daily    spironolactone  50 mg Oral Daily    lactulose  20 g Oral TID    heparin  5,000 Units Subcutaneous 2 times per day       Assessment & Plan:      #Hepatic encephalopathy  -Lactulose     #Debility  -2/2 above  -therapies to see. [facility]     #Alcohol-induced cirrhosis  #Ascites  -PTA: Spironolactone/Lasix (family reports non compliance)  -recurrent hospitalizations, palliative care to see  -US para for ascities tomorrow   -diuretics started. D/w GI today   -monitior BP. May need midodrine  -palliative  -orthostatics after para  -renal was consulted for CKD and diuretics     #Hyponatremia  #NAGMA  #CKD3  -dc iv hydration      #Abnormal cxr  -s/p iv abx in ER, pt has cough but improving, pt has known left lobe opacity from pna in aug, for now will hold further pna abx      #Macrocytic anemia  -considering EGD per GI     #Pyuria  -ruled out UTI  -DC Empiric Ancef   -Ucx neg     #Recent hospitalization  -8/3-8/7 where patient was admitted for acute respiratory failure secondary to pneumonia.     Can dc when cleared by iliana Rodriguez MD    Supplementary Documentation:     Quality:  DVT Mechanical Prophylaxis:   SCDs,    DVT Pharmacologic Prophylaxis   Medication    heparin (Porcine) 5000 UNIT/ML injection 5,000 Units                Code Status: DNAR/Selective Treatment  Street: External urinary catheter in place  Street Duration (in days):   Central line:    HEIKE: 9/16/2024    Discharge is dependent on: course  At this point Ms. Howe is expected to be discharge to:  home    The 21st Century Cures Act makes medical notes like these available to patients in the interest of transparency. Please be advised this is a medical document. Medical documents are intended to carry relevant information, facts as evident, and the clinical opinion of the practitioner. The medical note is intended as peer to peer communication and may appear blunt or direct. It is written in medical language and may contain abbreviations or verbiage that are unfamiliar.     Dietitian Malnutrition Assessment    Evaluation for Malnutrition: Criteria for non-severe malnutrition diagnosis-         acute illness/injury related to Energy intake less than 75% for greater than 7 days., Fluid accumulation mild.         RD Malnutrition Care Plan: See RD nutrition assessment for additional recommendations.    Body Fat/Muscle Mass:          Physician Assessment     Patient has a diagnosis of moderate malnutrition          **Certification      PHYSICIAN Certification of Need for Inpatient Hospitalization - Initial Certification    Patient will require inpatient services that will reasonably be expected to span two midnight's based on the clinical documentation in H+P.   Based on patients current state of illness, I anticipate that, after discharge, patient will require TBD.

## 2024-09-17 NOTE — PROGRESS NOTES
[facility]   part of [facility]  Palliative Care Progress Note    Gayla Howe Patient Status:  Inpatient    1940 MRN [id number]   Location [facility] 5NW-A Attending Vito Rodriguez MD   Hosp Day # 5 PCP Mina Correa MD     History/Other:  history of cryptogenic cirrhosis, ascites, macrocytic anemia, hypertension  who was admitted on 2024 for weakness and poor appetite. She was recently hospitalized in August for pneumonia, diagnosed with cirrhosis s/p paracentesis.  Work up in our hospital revealed encephalopathy, AMIRA, hypoalbuminemia, elevated proBNP  and hyponatremia.      Allergies:  Allergies   Allergen Reactions    Codeine NAUSEA ONLY     Medications:     Current Facility-Administered Medications:     furosemide (Lasix) tab 20 mg, 20 mg, Oral, Daily    spironolactone (Aldactone) tab 50 mg, 50 mg, Oral, Daily    lactulose (ENULOSE) solution 20 g, 20 g, Oral, TID    acetaminophen (Tylenol Extra Strength) tab 500 mg, 500 mg, Oral, Q4H PRN    melatonin tab 3 mg, 3 mg, Oral, Nightly PRN    polyethylene glycol (PEG 3350) (Miralax) 17 g oral packet 17 g, 17 g, Oral, Daily PRN    sennosides (Senokot) tab 17.2 mg, 17.2 mg, Oral, Nightly PRN    bisacodyl (Dulcolax) 10 MG rectal suppository 10 mg, 10 mg, Rectal, Daily PRN    ondansetron (Zofran) 4 MG/2ML injection 4 mg, 4 mg, Intravenous, Q6H PRN    metoclopramide (Reglan) 5 mg/mL injection 5 mg, 5 mg, Intravenous, Q8H PRN    heparin (Porcine) 5000 UNIT/ML injection 5,000 Units, 5,000 Units, Subcutaneous, 2 times per day    Subjective      Interval events: fall overnight when Gayla was sitting up in the chair.     Message from family stressing the importance of SAMMIE at discharge to allow her the opportunity to gain back some of her deconditioning from the hospitalization and prior to this hospitalization. Communicated to P.T.    Patient will be discharging home, Family working on caregivers.   We discussed medication compliance, nutrition  and follow up due to her frail condition. We discussed if she is not confused and is making decisions to not take her medications etc and understands the consequences then family will have to work on accepting her decisions.       Review of Systems:  Dyspnea: denies  Cough: denies  Nausea: denies  Appetite:  poor, has a few bites of meals.     Bowel Movement         9/17/2024  0530             Stool Count Calculated for I/O: 1            Objective:     Vital Signs:  Blood pressure 103/58, pulse 79, temperature 97.3 °F (36.3 °C), temperature source Oral, resp. rate 18, height 5' 2\" (1.575 m), weight 141 lb (64 kg), SpO2 92%.  Body mass index is 25.79 kg/m².      Performance scale: 60%  % Ambulation Activity Level Self-Care Intake Consciousness   100 Full  Normal  No Disease Full Normal Full   90 Full  Normal  Some Disease Full Normal Full   80 Full  Normal w/effort  Some Disease Full Normal or reduced Full   70 Reduced  Can't Perform Job  Some Disease Full Normal or reduced Full   60 Reduced  Can't Perform Hobby   Significant Disease Occ Assist Normal or reduced Full or confused   50 Mainly sit/lie Can't do any work  Extensive Disease Partial Assist Normal or reduced Full or confused   40 Mainly in bed Can't do any work  Extensive Disease Mainly Assist Normal or reduced Full or confused   30 Bed Bound Can't do any work  Extensive Disease Max Assist  Total Care Reduced  Drowsy/confused   20 Bed Bound Can't do any work  Extensive Disease Max Assist  Total Care Minimal  Drowsy/confused   10 Bed Bound Can't do any work  Extensive Disease Max Assist  Total Care Mouth Care  Drowsy/confused   0 Death          Physical Exam:  General: Alert & Awake. In no apparent respiratory distress.   HEENT: Dry MM   Cardiac:  regular rate  Abdomen: Softly distended  Extremities: +3 BLE edema present  Neurologic: Alert and oriented to  person, place, time, situation   Psychiatric: Mood pleasant mood   Skin: Warm and dry.        Results:      Hematology:  Lab Results   Component Value Date    WBC 7.0 09/15/2024    HGB 8.7 (L) 09/15/2024    HCT 24.9 (L) 09/15/2024    .0 (L) 09/15/2024     Coags:  Lab Results   Component Value Date    INR 2.45 (H) 09/16/2024     Chemistry:  Lab Results   Component Value Date    CREATSERUM 2.01 (H) 09/17/2024    BUN 18 09/17/2024     09/17/2024    K 3.7 09/17/2024     09/17/2024    CO2 19.0 (L) 09/17/2024     (H) 09/17/2024    CA 9.9 09/17/2024    ALB 2.0 (L) 09/15/2024    ALKPHO 107 09/15/2024    BILT 2.9 (H) 09/15/2024    TP 5.6 (L) 09/15/2024    AST 34 (H) 09/15/2024    ALT <7 (L) 09/15/2024    MG 2.0 09/15/2024     Imaging:  US PARACENTESIS W IMAGING (CPT=49083)    Result Date: 9/16/2024  CONCLUSION:  Ultrasound-guided paracentesis was performed without complication.   LOCATION:  [facility]     Dictated by (CST): Barney Gillette MD on 9/16/2024 at 11:46 AM     Finalized by (CST): Barney Gillette MD on 9/16/2024 at 11:47 AM           Summary of Discussion : Gayla and the family are planning on a treatment approach going forward. They hope to manage her paracentesis outpatient and Gayla will be compliant with her medications and follow up appointments.       Advance Care Planning counseling and discussion: DNAR selective treatment.     HC POA  surrogate . Healthcare Agent's Name: Fausto Howe   POL FORM Completed--Document in Epic  DNAR/Selective Treatment    Principal Problem:    Hepatic encephalopathy (HCC)  Active Problems:    Pneumonia due to infectious organism, unspecified laterality, unspecified part of lung    Palliative care encounter    Goals of care, counseling/discussion    Other ascites    Acute kidney injury superimposed on stage 3b chronic kidney disease (HCC)      Assessment and Recommendations   Goals of care:    Community palliative care with [facility] at discharge.   Code Status: DNAR/Selective Treatment  Healthcare Agent's Name: Fausto Howe      Discussed today's visit  with Radha CHAVEZ.     Palliative Care Follow Up: Palliative care team will sign off.   Palliative care follow up outpatient: is indicated and community palliative care ordered.    Thank you for allowing Palliative Care services to participate in the care of Gayla Howe.    A total of 35 minutes were spent on this follow up, which included all of the following: chart review, direct face to face contact,  physical examination, counseling, coordinating care and documentation.     Kassandra Wood, TRI  9/17/2024   10:41 AM   Palliative Care Services    The 21st Century Cures Act makes medical notes like these available to patients in the interest of transparency. Please be advised this is a medical document. Medical documents are intended to carry relevant information, facts as evident, and the clinical opinion of the practitioner. The medical note is intended as peer to peer communication and may appear blunt or direct. It is written in medical language and may contain abbreviations or verbiage that are unfamiliar.

## 2024-09-17 NOTE — PROGRESS NOTES
NURSING DISCHARGE NOTE    Discharged Home via Wheelchair.  Accompanied by RN  Belongings Taken by patient/family.     Pt discharged home. IV and tele removed. Discharge instruction reviewed with pt and pt family. All questions answered

## 2024-09-17 NOTE — PROGRESS NOTES
09/17/24 1508 09/17/24 1511 09/17/24 1514   Vital Signs   BP 96/51 95/60 102/53   MAP (mmHg) (!) 63 68 (!) 59   BP Location Right arm Right arm Right arm   BP Method Automatic Automatic Automatic   Patient Position Lying Sitting Standing

## 2024-09-17 NOTE — PLAN OF CARE
Pt from home w/ spouse, dc planning needed  Aox3-4, can be forgetful  VSS on RA  afebrile  NSR on tele, receiving Heparin  Electrolyte non-cardiac replacement  Orthos qshift  Incontinent, brief and purewick in place  Up x2 w/ walker, bed alarm on and call light within reach  Cardiac diet  Receiving Lactulos  Pt/family updated on current POC, no questions at this time    Problem: Patient/Family Goals  Goal: Patient/Family Long Term Goal  Description: Patient's Long Term Goal:   Discharge with adequate resources      Interventions:  - Follow care plan   - See additional Care Plan goals for specific interventions  Outcome: Progressing  Goal: Patient/Family Short Term Goal  Description: Patient's Short Term Goal:   Decrease ammonia levels   9/13 noc: get some rest  9/14am: remain comfortable   9/15 AM: get washed up, go on a walk  9/16 AM: get paracentesis    Interventions:   - Medication per MAR   - See additional Care Plan goals for specific interventions  Outcome: Progressing     Problem: PAIN - ADULT  Goal: Verbalizes/displays adequate comfort level or patient's stated pain goal  Description: INTERVENTIONS:  - Encourage pt to monitor pain and request assistance  - Assess pain using appropriate pain scale  - Administer analgesics based on type and severity of pain and evaluate response  - Implement non-pharmacological measures as appropriate and evaluate response  - Consider cultural and social influences on pain and pain management  - Manage/alleviate anxiety  - Utilize distraction and/or relaxation techniques  - Monitor for opioid side effects  - Notify MD/LIP if interventions unsuccessful or patient reports new pain  - Anticipate increased pain with activity and pre-medicate as appropriate  Outcome: Progressing     Problem: RISK FOR INFECTION - ADULT  Goal: Absence of fever/infection during anticipated neutropenic period  Description: INTERVENTIONS  - Monitor WBC  - Administer growth factors as ordered  -  Implement neutropenic guidelines  Outcome: Progressing     Problem: SAFETY ADULT - FALL  Goal: Free from fall injury  Description: INTERVENTIONS:  - Assess pt frequently for physical needs  - Identify cognitive and physical deficits and behaviors that affect risk of falls.  - Hickman fall precautions as indicated by assessment.  - Educate pt/family on patient safety including physical limitations  - Instruct pt to call for assistance with activity based on assessment  - Modify environment to reduce risk of injury  - Provide assistive devices as appropriate  - Consider OT/PT consult to assist with strengthening/mobility  - Encourage toileting schedule  Outcome: Progressing     Problem: DISCHARGE PLANNING  Goal: Discharge to home or other facility with appropriate resources  Description: INTERVENTIONS:  - Identify barriers to discharge w/pt and caregiver  - Include patient/family/discharge partner in discharge planning  - Arrange for needed discharge resources and transportation as appropriate  - Identify discharge learning needs (meds, wound care, etc)  - Arrange for interpreters to assist at discharge as needed  - Consider post-discharge preferences of patient/family/discharge partner  - Complete POLST form as appropriate  - Assess patient's ability to be responsible for managing their own health  - Refer to Case Management Department for coordinating discharge planning if the patient needs post-hospital services based on physician/LIP order or complex needs related to functional status, cognitive ability or social support system  Outcome: Progressing     Problem: Altered Communication/Language Barrier  Goal: Patient/Family is able to understand and participate in their care  Description: Interventions:  - Assess communication ability and preferred communication style  - Implement communication aides and strategies  - Use visual cues when possible  - Listen attentively, be patient, do not interrupt  - Minimize  distractions  - Allow time for understanding and response  - Establish method for patient to ask for assistance (call light)  - Provide an  as needed  - Communicate barriers and strategies to overcome with those who interact with patient  Outcome: Progressing

## 2024-09-17 NOTE — OCCUPATIONAL THERAPY NOTE
OCCUPATIONAL THERAPY TREATMENT NOTE - INPATIENT     Room Number: 501/501-A  Session: 1   Number of Visits to Meet Established Goals: 5    Presenting Problem: Hepatic encephalopathy  Prior to admission, patient's baseline is SV to MOD I for mobility/transfers short distances with RW, spouse assists with all Adls and needed especially all LB ADLs per spouse who is present.     ASSESSMENT   Patient demonstrates good  progress this session, goals remain in progress.    Patient continues to function near baseline with toileting, upper body dressing, lower body dressing, grooming, transfers, static sitting balance, dynamic sitting balance, static standing balance, dynamic standing balance, maintaining seated position, functional standing tolerance, and energy conservation strategies.   Contributing factors to remaining limitations include decreased functional strength, decreased functional reach, decreased endurance, medical status, and decreased compliance/participation.  Next session anticipate patient to progress upper body dressing, grooming, transfers, static sitting balance, dynamic sitting balance, static standing balance, dynamic standing balance, maintaining seated position, and functional standing tolerance.  Occupational Therapy will continue to follow patient for duration of hospitalization.    Patient continues to benefit from continued skilled OT services: at discharge to promote prior level of function and safety with additional support and return home with home health OT.        OT Device Recommendations: TBD    History: Patient is a 84 year old female admitted on 9/12/2024 from home for Hepatic encephalopathy.     Previous admission:  8/9-8/7/24 for acute resp failure 2/2 pna --> Dc'd home with [facility]     Co-Morbidities : cirrhotic liver    WEIGHT BEARING RESTRICTION  Weight Bearing Restriction: None                Recommendations for nursing staff:   Transfers: CGA  Toileting location: toilet    TREATMENT  SESSION:  Patient Start of Session: supine in bed for session  FUNCTIONAL TRANSFER ASSESSMENT  Sit to Stand: Chair  Edge of Bed: Not Tested  Chair: Contact Guard Assist  Toilet Transfer: Contact Guard Assist (with UE support as at home)  Commode Transfer: Not Tested    BED MOBILITY  Supine to Sit : Not tested  Scooting: not tested    BALANCE ASSESSMENT  Static Sitting: Contact Guard Assist  Sitting Bilateral: Contact Guard Assist  Static Standing: Contact Guard Assist  Standing Bilateral: Contact Guard Assist (to amb in room and hallway)    FUNCTIONAL ADL ASSESSMENT  Eating: Not Tested  Grooming Seated: Supervision (to wash face)  LB Dressing Seated: Maximum Assist (for shoes and socks as pt has this help at home)      ACTIVITY TOLERANCE: vitals stable                         O2 SATURATIONS       EDUCATION PROVIDED  Patient: Role of Occupational Therapy; Plan of Care; Functional Transfer Techniques; Fall Prevention; Proper Body Mechanics  Patient's Response to Education: Verbalized Understanding; Requires Further Education  Family/Caregiver: Role of Occupational Therapy; Plan of Care  Family/Caregiver's Response to Education: Verbalized Understanding      Equipment used: RW  Demonstrates functional use, Would benefit from additional trial      Exercises:    Exercises Repetitions Comments   Scapular elevation     Scapular retraction     Shoulder rolls     Shoulder flexion     Shoulder abduction     Shoulder internal/external rotation     Forward punch     Elbow flexion     Elbow extension     Forearm pronation/supination     Wrist flexion/extension     Gross grasp/fist pumps     Ankle pumps     Knee extension     Marching         Therapist comments: Pt reported fatigue at home, pt educated on work simplification and energy conservation for at home to assist with independence with fatigue at home.  Patient End of Session: Up in chair;Needs met;Call light within reach;RN aware of session/findings;All patient questions  and concerns addressed;Alarm set;Family present    SUBJECTIVE  Pt stated, \"I can do it, I guess.\"    PAIN ASSESSMENT  Ratin  Location: no pain at this time        OBJECTIVE  Precautions: Bed/chair alarm    AM-PAC ‘6-Clicks’ Inpatient Daily Activity Short Form  -   Putting on and taking off regular lower body clothing?: A Little  -   Bathing (including washing, rinsing, drying)?: A Little  -   Toileting, which includes using toilet, bedpan or urinal? : A Little  -   Putting on and taking off regular upper body clothing?: A Little  -   Taking care of personal grooming such as brushing teeth?: None  -   Eating meals?: None    AM-PAC Score:  Score: 20  Approx Degree of Impairment: 38.32%  Standardized Score (AM-PAC Scale): 42.03    PLAN  OT Treatment Plan: Balance activities;Energy conservation/work simplification techniques;ADL training;Functional transfer training;UE strengthening/ROM;Endurance training;Patient/Family education;Patient/Family training;Equipment eval/education;Compensatory technique education  Rehab Potential : Good  Frequency: 3-5x/week    OT Goals:   ADL Goals   Patient will perform upper body dressing:  with supervision  Patient will perform lower body dressing:  with min assist  Patient will perform toileting: with min assist     Functional Transfer Goals  Patient will transfer from sit to stand:  with stand by assist  Patient will transfer to bedside commode:  with stand by assist     UE Exercise Program Goal  Patient will be supervision with bilateral AROM HEP (home exercise program).     Additional Goals  Pt will tolerate sitting EOB x10 mins with SBA while completing functional ADL task    OT Session Time: 25 minutes  Self-Care Home Management: 10 minutes  Therapeutic Activity: 15 minutes  Neuromuscular Re-education: 0 minutes  Therapeutic Exercise: 0 minutes  Cognitive Skills: 0 minutes  Sensory Integrative: 0 minutes  Orthotic Management and Trainin minutes  Can add/delete any of  these

## 2024-09-17 NOTE — PHYSICAL THERAPY NOTE
PHYSICAL THERAPY TREATMENT NOTE - INPATIENT    Room Number: 501/501-A     Session: 1     Number of Visits to Meet Established Goals: 3    Presenting Problem: hepatic encephalopathy  Co-Morbidities : cirrhotic liver    ASSESSMENT   Patient demonstrates good  progress this session, goals remain in progress.    Patient continues to function near baseline with transfers and gait. Contributing factors to remaining limitations include decreased functional strength, decreased endurance/aerobic capacity, and decreased muscular endurance.  Next session anticipate patient to progress transfers and gait.  Physical Therapy will continue to follow patient for duration of hospitalization.    Patient continues to benefit from continued skilled PT services: at discharge to promote prior level of function and safety with additional support and return home with home health PT.    PLAN  PT Treatment Plan: Bed mobility;Body mechanics;Endurance;Energy conservation;Patient education;Family education;Gait training;Strengthening;Stoop training;Stair training;Transfer training;Balance training  Rehab Potential : Fair  Frequency (Obs):  (2-3x/week)    CURRENT GOALS     Goal #1 Patient is able to demonstrate supine - sit EOB @ level: supervision       Goal #2 Patient is able to demonstrate transfers Sit to/from Stand at assistance level: minimum assistance  MET     Goal #3 Patient is able to ambulate 50 feet with assist device: walker - rolling at assistance level: minimum assistance  MET  Updated to amb 50' with RW and sup.     Goal #4    Goal #5    Goal #6    Goal Comments: Goals established on 2024 see above for  ongoing,  goal 2 and 3 achieved.    SUBJECTIVE  \" I am okay.\"    OBJECTIVE  Precautions: Bed/chair alarm    WEIGHT BEARING RESTRICTION  Weight Bearing Restriction: None                PAIN ASSESSMENT   Ratin  Location: Denied  Management Techniques: Activity promotion    BALANCE                                                                                                                        Static Sitting: Good  Dynamic Sitting: Good           Static Standing: Fair -  Dynamic Standing: Poor +    ACTIVITY TOLERANCE         BP monitored during session     AM-PAC '6-Clicks' INPATIENT SHORT FORM - BASIC MOBILITY  How much difficulty does the patient currently have...  Patient Difficulty: Turning over in bed (including adjusting bedclothes, sheets and blankets)?: None   Patient Difficulty: Sitting down on and standing up from a chair with arms (e.g., wheelchair, bedside commode, etc.): A Little   Patient Difficulty: Moving from lying on back to sitting on the side of the bed?: None   How much help from another person does the patient currently need...   Help from Another: Moving to and from a bed to a chair (including a wheelchair)?: A Little   Help from Another: Need to walk in hospital room?: A Little   Help from Another: Climbing 3-5 steps with a railing?: Total       AM-PAC Score:  Raw Score: 18   Approx Degree of Impairment: 46.58%   Standardized Score (AM-PAC Scale): 43.63   CMS Modifier (G-Code): CK    FUNCTIONAL ABILITY STATUS  Gait Assessment   Functional Mobility/Gait Assessment  Gait Assistance: Contact guard assist  Distance (ft): 50  Assistive Device: Rolling walker  Pattern:  (slow felix)    Skilled Therapy Provided    Bed Mobility:  Rolling: sup   Supine<>Sit: sup   Sit<>Supine: NT     Transfer Mobility:  Sit<>Stand: SBA   Stand<>Sit: SBA   Gait: CGA and RW. Pt needs cues to keep RW in close proximity.     Therapist's Comments: Patient needed change of brief due to BM. PCT assisted, pt able to roll side to side with sup during this task. Patient was educated to call nursing staff upon urge in order to amb to bathroom.     Education provided on  Benefits of upright position  Promotion of walking with nursing staff  Body mechanics      SW was notified of findings and spoke with the CM in person regarding  providing pt's family CG information. RN was notified of session and recommended to have pct amb pt in hallway to prevent deconditioning.       THERAPEUTIC EXERCISES  Lower Extremity LAQ     Upper Extremity      Position Sitting     Repetitions   5   Sets   1     Patient End of Session: Up in chair;Needs met;Call light within reach;All patient questions and concerns addressed;Family present    PT Session Time: 30 minutes  Gait Training: 15 minutes  Therapeutic Activity: 15 minutes

## 2024-09-17 NOTE — PROGRESS NOTES
Residential Palliative Liaison received palliative referral for community PC services. Waiting to obtain insurance (verification/authorization) prior to pursuing.     Insurance accepted.    Hannah Edwards  Residential Palliative Liaison   [phone]

## 2024-09-17 NOTE — PROGRESS NOTES
[facility]  Nephrology Progress Note    Gayla Howe Patient Status:  Inpatient    1940 MRN [id number]   Location [facility] 5NW-A Attending Vito Rodriguez MD   Hosp Day # 5 PCP Mina Correa MD     Subjective:  She reports feeling well this morning, states that legs feel less heavy. Appetite improved. However, did have unwitnessed fall yesterday    Objective:  Vital signs: Blood pressure 103/58, pulse 79, temperature 97.3 °F (36.3 °C), temperature source Oral, resp. rate 18, height 5' 2\" (1.575 m), weight 141 lb (64 kg), SpO2 92%.  General: No acute distress. Alert   HEENT: Moist mucous membranes. EOM-I.   Respiratory: Clear to auscultation bilaterally.  No increased work of breathing  Cardiovascular: S1, S2.  Regular rate and rhythm.    Abdomen: Soft, nontender, less distended.    Musculoskeletal: Full range of motion of all extremities.  1+ LE edema  Integument: No lesions. No erythema.    Current Facility-Administered Medications   Medication Dose Route Frequency    furosemide (Lasix) tab 20 mg  20 mg Oral Daily    spironolactone (Aldactone) tab 50 mg  50 mg Oral Daily    lactulose (ENULOSE) solution 20 g  20 g Oral TID    acetaminophen (Tylenol Extra Strength) tab 500 mg  500 mg Oral Q4H PRN    melatonin tab 3 mg  3 mg Oral Nightly PRN    polyethylene glycol (PEG 3350) (Miralax) 17 g oral packet 17 g  17 g Oral Daily PRN    sennosides (Senokot) tab 17.2 mg  17.2 mg Oral Nightly PRN    bisacodyl (Dulcolax) 10 MG rectal suppository 10 mg  10 mg Rectal Daily PRN    ondansetron (Zofran) 4 MG/2ML injection 4 mg  4 mg Intravenous Q6H PRN    metoclopramide (Reglan) 5 mg/mL injection 5 mg  5 mg Intravenous Q8H PRN    heparin (Porcine) 5000 UNIT/ML injection 5,000 Units  5,000 Units Subcutaneous 2 times per day       Recent Labs     09/15/24  0649 24  0808   WBC 7.0  --    HGB 8.7*  --    MCV 95.0  --    .0*  --    INR  --  2.45*       Recent Labs     09/15/24  0649 24  0803  09/17/24  0823    134* 136   K 3.3*  3.3* 3.6  3.6 3.7    104 106   CO2 21.0 22.0 19.0*   BUN 22 22 18   CREATSERUM 1.92* 1.95* 2.01*   CA 9.4 9.9 9.9   MG 2.0  --   --        Recent Labs     09/15/24  0649   ALT <7*   AST 34*   ALB 2.0*       Assessment:  1) Acute kidney injury on CKD3b: Renal function is stable, urine sodium of 27 not consistent with HRS. May require higher serum creatinine to reflect euvolemia. However, given recent fall and age, could consider decreasing aldactone to 25mg daily. Will need follow-up labs on discharge and additional titration of diuretics may be needed.     2) Decompensated cirrhosis: Newly diagnosed, complicated by encephalopathy requiring lactulose and ascites requiring diuretics and frequent paracentesis. On lasix 20mg daily and aldactone 50mg daily.     3) Hyponatremia: resolved, likely in setting of hypervolemic hyponatremia    Thank you for allowing me to participate in this patient's care. Please feel free to call me with any questions or concerns.     Marissa Choudhury MD  09/17/24

## 2024-09-17 NOTE — PROGRESS NOTES
Significant Event - Fall Note    Date/Time of Fall: September 16, 2024 at 2040    Fall huddle completed: Yes    Description of patient fall:     Patient fell from: Chair     Activity when fall occurred: Transferring to or from bed, chair, etc     Where did fall occur: Patient room     Was the fall assisted: Found on floor/unassisted to floor    Who witnessed the fall: Unwitnessed    Patient narrative of fall: \"I don't know I just fell out of the chair\"    Staff narrative of fall: Charge RN hear someone yelling for help, opens the door and patient was already on the floor with her L knee bleeding.    Name of Provider notified of fall: Dr. Tesfaye    Family notification: Family notified    Factors contributing to fall:     Physical: Unsteady gait     Psychological: Impulsive     Environmental: Equipment     Medications received in the past 8 hours:   Medication(s) Administered in past 8 Hours from 09/16/2024 1521 to 09/16/2024 2321       Date/Time Order Dose Route Action Action by Comments    09/16/2024 2222 CDT heparin (Porcine) 5000 UNIT/ML injection 5,000 Units 5,000 Units Subcutaneous Given Ana Salmon RN --    09/16/2024 1550 CDT lactulose (ENULOSE) solution 20 g 20 g Oral Given Lazara Diana RN --    09/16/2024 2222 CDT lactulose (ENULOSE) solution 20 g 20 g Oral Given Ana Salmon RN --            Was patient identified as high fall risk prior to fall:          Yes                   What interventions were in place prior to fall: Call light within reach, Patient situated close to nursing station, and Personal items within reach    Interventions post fall: Bed alarm, Bed in lowest position, Call light within reach, Personal items within reach, Reality orientation, and Rounding    Additional comments: none

## 2024-09-17 NOTE — DISCHARGE INSTRUCTIONS
Resume [facility]  Phone: [phone]  Fax: [phone]    WOUND CARE:  Cleanse with mild soap/water, neosporin/ antibiotic ointment on wound with non adhesive cover

## 2024-09-18 ENCOUNTER — PATIENT OUTREACH (OUTPATIENT)
Dept: CASE MANAGEMENT | Age: 84
End: 2024-09-18

## 2024-09-18 NOTE — PROGRESS NOTES
Transitional Care Management   Discharge Date: 9/17/24  Contact Date: 9/18/2024    [facility] s/w patient's daughter Lea. She states that pt is following up with the pulmonologist on Friday. She states that what will most likely happen with the patient is that the fluid will build up again and will need to be drained but that Dr. Correa will be putting in a standing order for that so she does not have to be hospitalized and wait days to have it done. She states that is basically the main issue with the patient at this time and Dr. Correa is taking care of it. She states that she will consider TCC and will call back if she changes her mind. She denied having any questions or concerns at this time. She states that she is currently not with the patient and therefor does not know if [facility] has reached out but states that she can reach out to them with no issues if needed. [facility] closing encounter.

## 2024-09-18 NOTE — DISCHARGE SUMMARY
[place] [facility]  DISCHARGE SUMMARY     Gayla Howe Patient Status:  Inpatient    1940 MRN [id number]   Location [facility] 5NW-A Attending No att. providers found   Hosp Day # 5 PCP Mina Correa MD     Date of Admission: 2024  Date of Discharge:  2024     Discharge Disposition: Home or Self Care    Discharge Diagnosis:  #Hepatic encephalopathy  -Lactulose     #Debility  -2/2 above  -therapies to see. [facility]     #Alcohol-induced cirrhosis  #Ascites  -PTA: Spironolactone/Lasix (family reports non compliance)  -recurrent hospitalizations, palliative care to see  -US para for ascities tomorrow   -diuretics started. D/w GI today   -monitior BP. stable  -palliative  -orthostatics after para  -renal was consulted for CKD and diuretics     #Hyponatremia  #NAGMA  #CKD3  -dc iv hydration      #Abnormal cxr  -s/p iv abx in ER, pt has cough but improving, pt has known left lobe opacity from pna in aug, for now will hold further pna abx      #Macrocytic anemia  -considering EGD per GI     #Pyuria  -ruled out UTI  -DC Empiric Ancef   -Ucx neg     #Recent hospitalization  -8/3- where patient was admitted for acute respiratory failure secondary to pneumonia.    History of Present Illness: Gayla Howe is a 84 year old female with PMHx cirrhosis, CKD3, macrocytic anemia, recent hospitalization last month - for acute resp failure 2/2 pna presents today w/ family at the bedside for concerns patient is getting more weak and having intermittent confusion.  Patient lives at home with her .  Family admits patient does not take any of her Lasix/spironolactone.  Patient has poor appetite.  Patient has reportedly been having intermittent confusion.  Patient does have cough residually from pneumonia last month.  Overall patient is feeling better from a respiratory/cough standpoint.     -S/p lactulose, IV Zosyn, ivf gtt, GI consult       Brief Synopsis: pt w/ AMS and abdominal distention.  Eleavted ammonia c/w hepatic encephalopathy. Started lactulose and AMS resolved. Underwent paracentesis w/ 3L removed. Started on diuretics. Pt had not been taking at home. BP stable on diuretics. Ok to DC home. F/u GI closely. May need further paracentesis if recurs.     Lace+ Score: 73  59-90 High Risk  29-58 Medium Risk  0-28   Low Risk  Patient was referred to the [facility].    TCM Follow-Up Recommendation:  LACE > 58: High Risk of readmission after discharge from the hospital.      Procedures during hospitalization:   para    Incidental or significant findings and recommendations (brief descriptions):  none    Lab/Test results pending at Discharge:   none    Consultants:  GI, renal    Discharge Medication List:     Discharge Medications        START taking these medications        Instructions Prescription details   lactulose 20 GM/30ML Soln  Commonly known as: ENULOSE      Take 30 mL (20 g total) by mouth 3 (three) times daily. Hold remaining dose after 3 loose stools for the day   Stop taking on: October 16, 2024  Quantity: 2700 mL  Refills: 0            CONTINUE taking these medications        Instructions Prescription details   furosemide 20 MG Tabs  Commonly known as: Lasix      Take 1 tablet (20 mg total) by mouth daily.   Stop taking on: October 17, 2024  Quantity: 30 tablet  Refills: 0     spironolactone 50 MG Tabs  Commonly known as: Aldactone      Take 1 tablet (50 mg total) by mouth daily.   Stop taking on: October 17, 2024  Quantity: 30 tablet  Refills: 0            STOP taking these medications      benzonatate 100 MG Caps  Commonly known as: Tessalon                  Where to Get Your Medications        These medications were sent to [id number] - [place], [place] - [address] [phone], [phone]  [address] [facility] [address]      Phone: [phone]   furosemide 20 MG Tabs  lactulose 20 GM/30ML Soln  spironolactone 50 MG Tabs         [place]  [place]    Follow-up appointment:   Mina Correa MD  [address]  [facility] [address]  [phone]    Schedule an appointment as soon as possible for a visit in 3 week(s)  for a follow-up office visit with GI. The office will call pt's daughter Lea to arrange date/time of visit    [facility]  [address]  [facility] [address] [phone]  Schedule an appointment as soon as possible for a visit      Appointments for Next 30 Days 2024 - 10/17/2024        Date Arrival Time Visit Type Length Department Provider     2024 11:00 AM  EXAM - ESTABLISHED [2844] 30 min [facility], [facility] Vanesa Urbina MD    Patient Instructions:         Location Instructions:     Masks are optional for all patients and visitors, unless otherwise indicated.                      Vital signs:  Temp:  [97.3 °F (36.3 °C)-98.4 °F (36.9 °C)] 98 °F (36.7 °C)  Pulse:  [] 93  Resp:  [15-18] 18  BP: ()/(51-65) 100/61  SpO2:  [91 %-95 %] 94 %    Physical Exam:    General: No acute distress   Lungs: clear to auscultation  Cardiovascular: S1, S2  Abdomen: Soft      -----------------------------------------------------------------------------------------------  PATIENT DISCHARGE INSTRUCTIONS: See electronic chart    Vito Rodriguez MD    Total time spent on discharge plannin minutes     The  [employer] Cures Act makes medical notes like these available to patients in the interest of transparency. Please be advised this is a medical document. Medical documents are intended to carry relevant information, facts as evident, and the clinical opinion of the practitioner. The medical note is intended as peer to peer communication and may appear blunt or direct. It is written in medical language and may contain abbreviations or verbiage that are unfamiliar.

## 2024-09-20 ENCOUNTER — OFFICE VISIT (OUTPATIENT)
Facility: CLINIC | Age: 84
End: 2024-09-20
Payer: MEDICARE

## 2024-09-20 ENCOUNTER — LAB ENCOUNTER (OUTPATIENT)
Dept: LAB | Age: 84
End: 2024-09-20
Attending: INTERNAL MEDICINE
Payer: MEDICARE

## 2024-09-20 VITALS
OXYGEN SATURATION: 100 % | BODY MASS INDEX: 25.95 KG/M2 | HEART RATE: 103 BPM | WEIGHT: 141 LBS | RESPIRATION RATE: 15 BRPM | DIASTOLIC BLOOD PRESSURE: 52 MMHG | HEIGHT: 62 IN | SYSTOLIC BLOOD PRESSURE: 94 MMHG

## 2024-09-20 DIAGNOSIS — J90 PLEURAL EFFUSION: Primary | ICD-10-CM

## 2024-09-20 DIAGNOSIS — N18.32 STAGE 3B CHRONIC KIDNEY DISEASE (HCC): ICD-10-CM

## 2024-09-20 PROBLEM — D69.6 THROMBOCYTOPENIA (HCC): Chronic | Status: ACTIVE | Noted: 2024-01-01

## 2024-09-20 PROBLEM — D69.6 THROMBOCYTOPENIA (HCC): Chronic | Status: ACTIVE | Noted: 2024-09-20

## 2024-09-20 PROBLEM — N18.4 CKD (CHRONIC KIDNEY DISEASE) STAGE 4, GFR 15-29 ML/MIN (HCC): Chronic | Status: ACTIVE | Noted: 2024-09-20

## 2024-09-20 PROBLEM — N18.4 CKD (CHRONIC KIDNEY DISEASE) STAGE 4, GFR 15-29 ML/MIN (HCC): Chronic | Status: ACTIVE | Noted: 2024-01-01

## 2024-09-20 LAB
ANION GAP SERPL CALC-SCNC: 10 MMOL/L (ref 0–18)
BUN BLD-MCNC: 24 MG/DL (ref 9–23)
CALCIUM BLD-MCNC: 10.1 MG/DL (ref 8.7–10.4)
CHLORIDE SERPL-SCNC: 105 MMOL/L (ref 98–112)
CO2 SERPL-SCNC: 20 MMOL/L (ref 21–32)
CREAT BLD-MCNC: 2.33 MG/DL
EGFRCR SERPLBLD CKD-EPI 2021: 20 ML/MIN/1.73M2 (ref 60–?)
GLUCOSE BLD-MCNC: 124 MG/DL (ref 70–99)
OSMOLALITY SERPL CALC.SUM OF ELEC: 285 MOSM/KG (ref 275–295)
POTASSIUM SERPL-SCNC: 3.8 MMOL/L (ref 3.5–5.1)
SODIUM SERPL-SCNC: 135 MMOL/L (ref 136–145)

## 2024-09-20 PROCEDURE — 99214 OFFICE O/P EST MOD 30 MIN: CPT | Performed by: INTERNAL MEDICINE

## 2024-09-20 PROCEDURE — 36415 COLL VENOUS BLD VENIPUNCTURE: CPT

## 2024-09-20 PROCEDURE — 80048 BASIC METABOLIC PNL TOTAL CA: CPT

## 2024-09-20 NOTE — PROGRESS NOTES
[facility] Pulmonary Follow Up Note    Chief Complaint:  Chief Complaint   Patient presents with    Follow - Up     Hospital f/u for PNA CXR 9/12       History of Present Illness:  Gayla Howe is a 84 year old female who presents today for hospital follow-up.  Patient was hospitalized for pneumonia and was found to have a left-sided pleural effusion, which was found to be transudative.  Patient has undergone since then a paracentesis and breathing is overall better.  She is still quite weak and has limited mobility, but breathing is overall better.    Past Medical History:   Past Medical History:    Cirrhosis (HCC)    Essential hypertension    High blood pressure    History of blood clots    Hoarseness, chronic    Leg swelling    Loss of appetite    Pneumonia due to organism    Wears glasses        Past Surgical History:   History reviewed. No pertinent surgical history.    Family Medical History:   Family History   Problem Relation Age of Onset    Other (Dyslexia) Mother     Heart Disorder Father     Stroke Father     Stroke Maternal Grandmother         Social History:   Social History     Socioeconomic History    Marital status:      Spouse name: Not on file    Number of children: Not on file    Years of education: Not on file    Highest education level: Not on file   Occupational History    Occupation: Teacher     Comment: elementary   Tobacco Use    Smoking status: Never     Passive exposure: Never    Smokeless tobacco: Never   Vaping Use    Vaping status: Never Used   Substance and Sexual Activity    Alcohol use: Not Currently     Comment: social    Drug use: Never    Sexual activity: Not on file   Other Topics Concern    Caffeine Concern No    Exercise No    Seat Belt Yes    Special Diet No    Stress Concern Yes    Weight Concern No   Social History Narrative    Not on file     Social Determinants of Health     Financial Resource Strain: Not on file   Food Insecurity: No Food Insecurity (9/12/2024)    Food  Insecurity     Food Insecurity: Never true   Transportation Needs: No Transportation Needs (9/12/2024)    Transportation Needs     Lack of Transportation: No     Car Seat: Not on file   Physical Activity: Not on file   Stress: Not on file   Social Connections: Not on file   Housing Stability: Low Risk  (9/12/2024)    Housing Stability     Housing Instability: No     Housing Instability Emergency: Not on file     Crib or Bassinette: Not on file        Medications:   Current Outpatient Medications   Medication Sig Dispense Refill    furosemide (LASIX) 20 MG Oral Tab Take 1 tablet (20 mg total) by mouth daily. 30 tablet 0    spironolactone 50 MG Oral Tab Take 1 tablet (50 mg total) by mouth daily. 30 tablet 0    lactulose 20 GM/30ML Oral Solution Take 30 mL (20 g total) by mouth 3 (three) times daily. Hold remaining dose after 3 loose stools for the day 2700 mL 0       Review of Systems: Review of Systems     Physical Exam:  BP 94/52 (BP Location: Right arm, Patient Position: Sitting, Cuff Size: adult)   Pulse 103   Resp 15   Ht 5' 2\" (1.575 m)   Wt 141 lb (64 kg)   SpO2 100%   BMI 25.79 kg/m²      Constitutional: alert, cooperative. No acute distress.  HEENT: Head NC/AT. Nares normal. Septum midline. Mucosa normal. No drainage or sinus tenderness.  Cardio: Regular rate and rhythm. Normal S1 and S2. No murmurs.   Respiratory: Thorax symmetrical with no labored breathing. clear to auscultation bilaterally  Extremities: No clubbing or cyanosis. No BLE edema.    Neurologic: A&Ox3. No gross motor deficits.  Skin: Warm, dry  Psych: Calm, cooperative. Pleasant affect.    Results:  Personally reviewed  XR KNEE (1 OR 2 VIEWS), LEFT (CPT=73560)  Narrative: PROCEDURE:  XR KNEE (1 OR 2 VIEWS), LEFT (CPT=73560)     COMPARISON:  None.     INDICATIONS:  fall     PATIENT STATED HISTORY: (As transcribed by Technologist)  Patient states fall onto left knee.          FINDINGS:    No acute fracture.  There is a small joint  effusion.  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  There is lateral offset of the tibia relative to the distal femur, which may   be degenerative, correlate clinically.                   Impression: CONCLUSION:  See above        LOCATION:  [id number]        Dictated by (CST): Kei Skaggs MD on 9/17/2024 at 2:31 PM       Finalized by (CST): Kei Skaggs MD on 9/17/2024 at 2:32 PM         PFTs:       No data to display                   No data to display                    WBC: 7, done on 9/15/2024.  HGB: 8.7, done on 9/15/2024.  PLT: 141, done on 9/15/2024.     Glucose: 124, done on 9/20/2024.  Cr: 2.33, done on 9/20/2024.  Last eGFR was 20 on 9/20/2024.  CA: 10.1, done on 9/20/2024.  Na: 135, done on 9/20/2024.  K: 3.8, done on 9/20/2024.  Cl: 105, done on 9/20/2024.  CO2: 20, done on 9/20/2024.  Last ALB was 2% done on 9/15/2024.     XR KNEE (1 OR 2 VIEWS), LEFT (CPT=73560)    Result Date: 9/17/2024  CONCLUSION:  See above   LOCATION:  [id number]   Dictated by (CST): Kei Skaggs MD on 9/17/2024 at 2:31 PM     Finalized by (CST): Kei Skaggs MD on 9/17/2024 at 2:32 PM       US PARACENTESIS W IMAGING (CPT=49083)    Result Date: 9/16/2024  CONCLUSION:  Ultrasound-guided paracentesis was performed without complication.   LOCATION:  [facility]     Dictated by (CST): Barney Gillette MD on 9/16/2024 at 11:46 AM     Finalized by (CST): Barney Gillette MD on 9/16/2024 at 11:47 AM       XR CHEST AP PORTABLE  (CPT=71045)    Result Date: 9/12/2024  CONCLUSION:    Sizable hiatal hernia.  Small left pleural effusion and left lower lobe retrocardiac consolidation, possible pneumonia.  Right lung grossly clear with blunting right costophrenic angle minimal.  Heart mostly obscured.  No sign of pneumothorax or CHF. Consider upright PA and lateral examination of the chest, when tolerated.   LOCATION:  [facility]      Dictated by (CST): Daniel Salgado MD  on 9/12/2024 at 5:49 PM     Finalized by (CST): Daniel Salgdao MD on 9/12/2024 at 5:50 PM       US PARACENTESIS W IMAGING (CPT=49083)    Result Date: 8/6/2024  CONCLUSION:  Ultrasound-guided paracentesis was performed without complication.   LOCATION:  [facility]     Dictated by (CST): Zac Hall MD on 8/06/2024 at 11:04 AM     Finalized by (CST): Zac Hall MD on 8/06/2024 at 11:05 AM       US THORACENTESIS GUIDED LEFT (CPT=32555)    Result Date: 8/5/2024  CONCLUSION: Successful left sided thoracentesis without complication   LOCATION:  [facility]    Dictated by (CST): Anaid Vee MD on 8/05/2024 at 4:02 PM     Finalized by (CST): Anaid Vee MD on 8/05/2024 at 4:03 PM       US ABDOMEN DOPPLER ONLY (CPT=93975)    Result Date: 8/5/2024  CONCLUSION:  1. Cirrhotic morphology of the liver. 2. Nonspecific moderate volume ascites. 3. No flow detected in the portal vein segments.  This may be related to slow or balanced flow, as patency was demonstrated on the CT scan from 2 days ago. 4. Bowel gas obscuration of the pancreas.    LOCATION:  [facility]    Dictated by (CST): Wilberto Farrell MD on 8/05/2024 at 2:42 PM     Finalized by (CST): Wilberto Farrell MD on 8/05/2024 at 2:48 PM       XR CHEST AP PORTABLE  (CPT=71045)    Result Date: 8/5/2024  CONCLUSION:  No pneumothorax after left-sided thoracentesis.   LOCATION:  [facility]      Dictated by (CST): Wilberto Farrell MD on 8/05/2024 at 2:42 PM     Finalized by (CST): Wilberto Farrell MD on 8/05/2024 at 2:42 PM       XR CHEST AP PORTABLE  (CPT=71045)    Result Date: 8/5/2024  CONCLUSION:  No significant change.  Persistent left pleural effusion and left lower lobe pneumonia/atelectasis.   LOCATION:  [facility]      Dictated by (CST): David Leavitt MD on 8/05/2024 at 11:52 AM     Finalized by (CST): David Leavitt MD on 8/05/2024 at 11:53 AM       CT ABDOMEN+PELVIS(CONTRAST ONLY)(CPT=74177)    Result Date: 8/3/2024  CONCLUSION:   1. Partially imaged possibly loculated left pleural effusion with  atelectasis/consolidation in the left lower lobe.  Underlying pneumonia is not excluded.  Clinical correlation recommended.   2. Cirrhotic changes in the liver with moderate ascites in the abdomen and pelvis.  Mild mesenteric edema.  3. Uncomplicated colonic diverticulosis.  4. Large hiatal hernia.   Please see above for further details.  LOCATION:  [id number]   Dictated by (CST): Barney Gillette MD on 8/03/2024 at 3:31 PM     Finalized by (CST): Barney Gillette MD on 8/03/2024 at 3:35 PM       XR CHEST AP PORTABLE  (CPT=71045)    Result Date: 8/3/2024  CONCLUSION:  Dense consolidation in the left lower lobe with associated small left pleural effusion is concerning for pneumonia.  Clinical correlation recommended along with follow-up to confirm resolution.    LOCATION:  [id number]      Dictated by (CST): Barney Gillette MD on 8/03/2024 at 2:16 PM     Finalized by (CST): Barney Gillette MD on 8/03/2024 at 2:17 PM         Assessment/Plan:  #1. Possible pneumonia has resolved left with small left pleural effusion likely 2/2 cirrhosis s/p thoracentesis on 8/5 results c/w transudate  Reviewed hospitalization notes, labs and imaging  Reviewed thoracentesis results  Patient has follow-up with gastroenterology coming up next month  I recommended that she undergo paracentesis and then recheck CT scan of chest in order to evaluate lungs as well as pleural effusion  Follow-up after obtains CT scan    Return in about 3 months (around 12/20/2024).    I spent 30 minutes obtaining and reviewing records, preparing for the visit including reviewing chart and prior testing, face to face time examining the patient and obtaining history, counseling, arranging and reviewing office-based testing, independently reviewing relevant studies and documentation exclusive of other billable procedures.      Vanesa Urbina MD  9/20/2024

## 2024-10-01 ENCOUNTER — APPOINTMENT (OUTPATIENT)
Dept: CT IMAGING | Facility: HOSPITAL | Age: 84
End: 2024-10-01
Attending: INTERNAL MEDICINE
Payer: MEDICARE

## 2024-10-01 ENCOUNTER — HOSPITAL ENCOUNTER (OUTPATIENT)
Dept: ULTRASOUND IMAGING | Facility: HOSPITAL | Age: 84
Discharge: HOME | End: 2024-10-01
Attending: INTERNAL MEDICINE
Payer: MEDICARE

## 2024-10-01 ENCOUNTER — HOSPITAL ENCOUNTER (OUTPATIENT)
Dept: CT IMAGING | Facility: HOSPITAL | Age: 84
Discharge: HOME | End: 2024-10-01
Attending: INTERNAL MEDICINE
Payer: MEDICARE

## 2024-10-01 ENCOUNTER — NURSE ONLY (OUTPATIENT)
Dept: LAB | Facility: HOSPITAL | Age: 84
End: 2024-10-01
Attending: INTERNAL MEDICINE
Payer: MEDICARE

## 2024-10-01 VITALS
WEIGHT: 135 LBS | TEMPERATURE: 98 F | HEART RATE: 72 BPM | OXYGEN SATURATION: 97 % | RESPIRATION RATE: 18 BRPM | DIASTOLIC BLOOD PRESSURE: 57 MMHG | SYSTOLIC BLOOD PRESSURE: 100 MMHG | HEIGHT: 60 IN | BODY MASS INDEX: 26.5 KG/M2

## 2024-10-01 DIAGNOSIS — R18.8 CIRRHOSIS OF LIVER WITH ASCITES, UNSPECIFIED HEPATIC CIRRHOSIS TYPE (HCC): ICD-10-CM

## 2024-10-01 DIAGNOSIS — K74.60 CIRRHOSIS OF LIVER WITH ASCITES, UNSPECIFIED HEPATIC CIRRHOSIS TYPE (HCC): ICD-10-CM

## 2024-10-01 DIAGNOSIS — J90 PLEURAL EFFUSION: ICD-10-CM

## 2024-10-01 DIAGNOSIS — K74.60 CIRRHOSIS OF LIVER (HCC): Primary | ICD-10-CM

## 2024-10-01 PROCEDURE — 49083 ABD PARACENTESIS W/IMAGING: CPT | Performed by: INTERNAL MEDICINE

## 2024-10-01 PROCEDURE — 71250 CT THORAX DX C-: CPT | Performed by: INTERNAL MEDICINE

## 2024-10-01 RX ORDER — SODIUM CHLORIDE 9 MG/ML
INJECTION, SOLUTION INTRAVENOUS CONTINUOUS
Status: DISCONTINUED | OUTPATIENT
Start: 2024-10-01 | End: 2024-10-03

## 2024-10-01 RX ORDER — ONDANSETRON 2 MG/ML
4 INJECTION INTRAMUSCULAR; INTRAVENOUS ONCE AS NEEDED
Status: DISCONTINUED | OUTPATIENT
Start: 2024-10-01 | End: 2024-10-01

## 2024-10-01 NOTE — DISCHARGE INSTRUCTIONS
[facility] Department of Radiology  Paracentesis    Activity  Take it easy for the rest of the day after your procedure. You may be sore at the site for the next five days. Do not do any strenuous exercises or lift over 5 lbs. for the next 24 hours.    Site Care  Keep a bandage on the site for the next 24 hours.  You may shower after 24 hours, no soaking in a bath tub for 7 days.    Diet  Resume your usual diet.    Pain Management  You may use a covered ice pack for topical pain relief, as needed.    Medications  You may resume your home medications. If you take blood thinners, you may resume them starting the day after your procedure.    When to seek medical advice  Call your ordering provider with questions and to discuss test results.   If you have any questions or concerns about the procedure, please call the Radiology Nurse at [phone] from 8am - 5pm, Monday-Friday. After those hours, Dial [phone] and ask the Radiology Department  to page the on-call Interventional Radiologist if any of these occur:      There is a change in color or temperature of the area where the procedure was performed.  There is more than a small amount of fluid leaking from the puncture site.  You develop increasing pain or shortness of breath.  Unusual drowsiness, weakness, or dizziness gets worse.  Unusual vomiting.    IF YOU FEEL YOU ARE EXPERIENCIG AN EMERGENCY,   CALL 911 OR GO TO THE NEAREST EMERGENCY ROOM.          4.2.24 MO/  Radiology

## 2024-10-01 NOTE — IMAGING NOTE
Patient received in holding.   All pertinent labs reviewed.  NPO since 0000  3000 cc removed by DR Hall  Puncture site to RLQ with tegaderm dressing in place, C/D/I.  Patient denies pain.   Report given to April CHAVEZ.   Patient transported to [facility] accompanied by [employer].  Patient family members request help scheduling CT scan of chest post paracentesis.  Ordered by pulmonologist Dr Urbina, per his note wants CT chest post paracentesis due to the presentation of the fluid during her recent thoracentesis.  Approved with CT lead, Rad Rn called central scheduling and scheduled patient's ct chest today at 1700.  Patient and family made aware.  They thought ct chest was already tied to this appointment and were unaware she needed to schedule the ct chest as well.  [facility] made aware, patient will just need wheelchair at discharge from [facility].

## 2024-10-01 NOTE — PROCEDURES
[facility]   part of [facility]  Procedure Note    Gayla Howe Patient Status:  Outpatient    1940 MRN [id number]   Location [facility] ULTRASOUND Attending Mina Correa MD    Day # 0 PCP Mina Correa MD     Procedure: US paracentesis    Pre-Procedure Diagnosis:  Ascites    Post-Procedure Diagnosis: Same    Anesthesia:  Local    Findings:  5f one step to RLQ    Specimens: See report    Blood Loss:  Minimal    Tourniquet Time: None  Complications:  None  Drains:  None    Secondary Diagnosis:  None    Zac Hall MD  10/1/2024

## 2025-02-07 NOTE — PHYSICAL THERAPY NOTE
Attempted for PT this date.  Upon meeting with pt and family, pt declining and family reporting just began to eat something in 10 days.  Pt agrees with PT returning at a later date.  RN aware.   Called and spoke with patient, relayed VSP message. He VU.

## (undated) NOTE — Clinical Note
Your patient was recently seen at the [facility] for a hospital follow-up visit. The visit note is attached. Please contact the clinic with any questions at [phone].     Thank you,  TRI Stewart

## (undated) NOTE — LETTER
Date: 8/12/2024  Patient Name:  Gayla Howe             Address: [id number] [place] [address]    UNREAD MYCHART RESULT NOTE    Mario Shukla,     The fluid taken during your recent paracentesis was negative for infection and cancer cells.  This is good news!     Sincerely,     Dr. Mike Newby

## (undated) NOTE — Clinical Note
Your patient was recently seen at the [facility] for a hospital follow-up visit. The visit note is attached. Please contact the clinic with any questions at [phone].     Thank you,  TRI Kendrick

## (undated) NOTE — LETTER
Your patient was recently seen at the [facility] for a hospital follow-up visit. The visit note is attached. Please contact the clinic with any questions at [phone].     Thank you,  TRI Stewart

## (undated) NOTE — Clinical Note
LAMONT, TCM call made, see notes. BEE confirmed with patient that she has a HFU on 12/23/19 at the [facility], BEE changed the visit type to TCM HFU.

## (undated) NOTE — Clinical Note
Your patient was recently seen at the [facility] for a hospital follow-up visit. The visit note is attached. Please contact the clinic with any questions at [phone].     Thank you,  TRI Cortes